# Patient Record
Sex: FEMALE | Race: BLACK OR AFRICAN AMERICAN | Employment: FULL TIME
[De-identification: names, ages, dates, MRNs, and addresses within clinical notes are randomized per-mention and may not be internally consistent; named-entity substitution may affect disease eponyms.]

---

## 2017-06-03 ENCOUNTER — HEALTH MAINTENANCE LETTER (OUTPATIENT)
Age: 32
End: 2017-06-03

## 2019-05-23 ENCOUNTER — OFFICE VISIT (OUTPATIENT)
Dept: INFECTIOUS DISEASES | Facility: CLINIC | Age: 34
End: 2019-05-23
Attending: COLON & RECTAL SURGERY
Payer: COMMERCIAL

## 2019-05-23 VITALS
HEIGHT: 64 IN | HEART RATE: 79 BPM | DIASTOLIC BLOOD PRESSURE: 70 MMHG | BODY MASS INDEX: 46.03 KG/M2 | SYSTOLIC BLOOD PRESSURE: 102 MMHG | WEIGHT: 269.6 LBS | TEMPERATURE: 98.1 F

## 2019-05-23 DIAGNOSIS — G89.4 CHRONIC PAIN SYNDROME: ICD-10-CM

## 2019-05-23 DIAGNOSIS — G89.4 CHRONIC PAIN SYNDROME: Primary | ICD-10-CM

## 2019-05-23 LAB
ALBUMIN SERPL-MCNC: 3.4 G/DL (ref 3.4–5)
ALP SERPL-CCNC: 75 U/L (ref 40–150)
ALT SERPL W P-5'-P-CCNC: 18 U/L (ref 0–50)
ANION GAP SERPL CALCULATED.3IONS-SCNC: 7 MMOL/L (ref 3–14)
AST SERPL W P-5'-P-CCNC: 16 U/L (ref 0–45)
BASOPHILS # BLD AUTO: 0 10E9/L (ref 0–0.2)
BASOPHILS NFR BLD AUTO: 1 %
BILIRUB SERPL-MCNC: 0.6 MG/DL (ref 0.2–1.3)
BUN SERPL-MCNC: 13 MG/DL (ref 7–30)
CALCIUM SERPL-MCNC: 8.7 MG/DL (ref 8.5–10.1)
CHLORIDE SERPL-SCNC: 105 MMOL/L (ref 94–109)
CO2 SERPL-SCNC: 26 MMOL/L (ref 20–32)
CREAT SERPL-MCNC: 0.85 MG/DL (ref 0.52–1.04)
DIFFERENTIAL METHOD BLD: ABNORMAL
EOSINOPHIL # BLD AUTO: 0.1 10E9/L (ref 0–0.7)
EOSINOPHIL NFR BLD AUTO: 1.8 %
ERYTHROCYTE [DISTWIDTH] IN BLOOD BY AUTOMATED COUNT: 14.5 % (ref 10–15)
GFR SERPL CREATININE-BSD FRML MDRD: 88 ML/MIN/{1.73_M2}
GLUCOSE SERPL-MCNC: 102 MG/DL (ref 70–99)
HCT VFR BLD AUTO: 39.1 % (ref 35–47)
HGB BLD-MCNC: 12.4 G/DL (ref 11.7–15.7)
IMM GRANULOCYTES # BLD: 0 10E9/L (ref 0–0.4)
IMM GRANULOCYTES NFR BLD: 0.3 %
LYMPHOCYTES # BLD AUTO: 1.4 10E9/L (ref 0.8–5.3)
LYMPHOCYTES NFR BLD AUTO: 36.9 %
MCH RBC QN AUTO: 28.1 PG (ref 26.5–33)
MCHC RBC AUTO-ENTMCNC: 31.7 G/DL (ref 31.5–36.5)
MCV RBC AUTO: 89 FL (ref 78–100)
MONOCYTES # BLD AUTO: 0.4 10E9/L (ref 0–1.3)
MONOCYTES NFR BLD AUTO: 10 %
NEUTROPHILS # BLD AUTO: 2 10E9/L (ref 1.6–8.3)
NEUTROPHILS NFR BLD AUTO: 50 %
NRBC # BLD AUTO: 0 10*3/UL
NRBC BLD AUTO-RTO: 0 /100
PLATELET # BLD AUTO: 204 10E9/L (ref 150–450)
POTASSIUM SERPL-SCNC: 3.6 MMOL/L (ref 3.4–5.3)
PROT SERPL-MCNC: 7.7 G/DL (ref 6.8–8.8)
RBC # BLD AUTO: 4.42 10E12/L (ref 3.8–5.2)
SODIUM SERPL-SCNC: 138 MMOL/L (ref 133–144)
WBC # BLD AUTO: 3.9 10E9/L (ref 4–11)

## 2019-05-23 PROCEDURE — G0463 HOSPITAL OUTPT CLINIC VISIT: HCPCS | Mod: ZF

## 2019-05-23 PROCEDURE — 87536 HIV-1 QUANT&REVRSE TRNSCRPJ: CPT | Performed by: COLON & RECTAL SURGERY

## 2019-05-23 PROCEDURE — 36415 COLL VENOUS BLD VENIPUNCTURE: CPT | Performed by: COLON & RECTAL SURGERY

## 2019-05-23 PROCEDURE — 85025 COMPLETE CBC W/AUTO DIFF WBC: CPT | Performed by: COLON & RECTAL SURGERY

## 2019-05-23 PROCEDURE — 86360 T CELL ABSOLUTE COUNT/RATIO: CPT | Performed by: COLON & RECTAL SURGERY

## 2019-05-23 PROCEDURE — 86359 T CELLS TOTAL COUNT: CPT | Performed by: COLON & RECTAL SURGERY

## 2019-05-23 PROCEDURE — 80053 COMPREHEN METABOLIC PANEL: CPT | Performed by: COLON & RECTAL SURGERY

## 2019-05-23 RX ORDER — OXYCODONE AND ACETAMINOPHEN 5; 325 MG/1; MG/1
1-2 TABLET ORAL EVERY 6 HOURS PRN
Qty: 35 TABLET | Refills: 0 | Status: SHIPPED | OUTPATIENT
Start: 2019-05-23 | End: 2019-06-27

## 2019-05-23 RX ORDER — FLUCONAZOLE 150 MG/1
TABLET ORAL
COMMUNITY
Start: 2018-01-08 | End: 2019-05-23

## 2019-05-23 RX ORDER — HYDROCORTISONE 2.5 %
CREAM (GRAM) TOPICAL
COMMUNITY
Start: 2018-07-12 | End: 2020-01-09

## 2019-05-23 RX ORDER — ALBUTEROL SULFATE 90 UG/1
1-2 AEROSOL, METERED RESPIRATORY (INHALATION)
COMMUNITY
Start: 2017-12-07 | End: 2020-12-24

## 2019-05-23 RX ORDER — OXYCODONE AND ACETAMINOPHEN 5; 325 MG/1; MG/1
1-2 TABLET ORAL
COMMUNITY
Start: 2019-05-16 | End: 2019-05-23

## 2019-05-23 RX ORDER — IBUPROFEN 600 MG/1
600 TABLET, FILM COATED ORAL
COMMUNITY
Start: 2019-05-14 | End: 2020-12-24

## 2019-05-23 ASSESSMENT — MIFFLIN-ST. JEOR: SCORE: 1897.9

## 2019-05-23 ASSESSMENT — PAIN SCALES - GENERAL: PAINLEVEL: SEVERE PAIN (7)

## 2019-05-23 NOTE — NURSING NOTE
"/70   Pulse 79   Temp 98.1  F (36.7  C) (Oral)   Ht 1.626 m (5' 4\")   Wt 122.3 kg (269 lb 9.6 oz)   BMI 46.28 kg/m    Chief Complaint   Patient presents with     Consult     consult with viktor LACKEY cma       "

## 2019-05-23 NOTE — PROGRESS NOTES
[facility]  HIV New Patient Visit  5/23/2019         REASON FOR VISIT: Transfer care for HIV    HISTORY OF PRESENT ILLNESS:  Bella Fung is a 34 year old female who is here to establish care for HIV.  She was initially diagnosed in 2011 just before she was incarcerated. Her  is HIV positive and she presumably acquired her infection from him.  She started ARVs in 2012, shortly after she was released from snf.     During her initial period of care, she followed with Sonam Sheehan at [facility] who she met while she was in custodial. She was initially treated with Truvada/boosted atazanavir from 2012 until 8/2013. She was then switched to darunavir from atazanavir; it is not entirely clear why she was switched, but around that time she had more difficulty with back pain. She also states that she had issues with depression with some of these medications. She has been on multiple medication regimens over the years (see below), some of which were switched after periods of non-compliance, and some after possible side effects, but none with severe associated adverse events.     Sonam Sheehan left [facility] in early 2018, and at that time Bella started to follow with Dr. Morel. She states that she found it challenging to communicate with him and expresses some frustration with changes to her pain medications, including stopping her Tramadol (which she was taking when she was awake/at work).     She states that she has been off of her current HIV regimen (Descovy/Tivicay) for the last 1 year. She states that even before this last year there have been times that she has been on and off the medications, but that toward the end with working with Sonam she was able to take the medications more regularly. Review of her [facility] records shows a complex history of taking her ARVs and negotiations with her providers over her chronic narcotics for her low back pain. In our interview today the patient was very edilberto about her  "chronic pain medication use.      Her main chronic medical problem in addition to HIV is her chronic back for many years. She has tried many treatments, and been on several different pain regimens. She has not tried physical therapy. She has seen pain specialists in the past, but did not find this helpful.     She states that she is otherwise in her usual state of health and denies any recent fevers, chills, cough, shortness of breath. She is chronically slightly short of breath, which she attributes to her obesity. No chest pain. She is ultimately interested in losing weight. No diarrhea, nausea, vomiting, abdominal pain. No headaches, vision changes, numbness or tingling in the hands or feet.      REVIEW OF SYSTEMS: Complete 12-point ROS is negative except as noted above.    PAST MEDICAL HISTORY:  History of congenital heart defect s/p repair  Chronic low back pain  Obesity  HIV, diaginosed 2011, CD4 Manuel: 99 (9/2018), Opportunistic Infections: None, HLA  Status: Negative 2012. Historical use of ARVs: Truvada, Reyataz, and Norvir from 2012 until 8/2013; switched atazanavir to darunavir 8/2013; emtricitabine-tenofovir and darunavir-cobicistat (PREZCOBIX) 10/2014- around 2016, Descovy and Prezcobix in 2017, then ~ 11/2017 to Descovy and Tivicay.       MEDICATIONS:  Prescribed dolutegravir + tivicay but non-adherent for past year.   Ibuprofen prn  Percocet prn  Albuterol inh prn    ALLERGIES: NKDA    SOCIAL HISTORY:  She has two children. Her  has two children. She is just sexually active with her . He is off of meds.     She smokes marijuana daily. She does not smoke tobacco. Had previously tried to get medical marijuana, but got stuck in the process. She has tried other things, but it has been several years (ecstasy, cocaine). Denies any IV drug use.     FAMILY HISTORY:  Diabetes, Hypertension     EXAM:   VITALS:  /70   Pulse 79   Temp 98.1  F (36.7  C) (Oral)   Ht 1.626 m (5' 4\")   Wt " 122.3 kg (269 lb 9.6 oz)   BMI 46.28 kg/m     GEN: Pleasant, NAD  HEENT: NC/AT, sclera anicteric. OP moist and clear.   NECK: Supple, nontender, no TRISTA.  LUNGS: CTA bilaterally  CV: RRR  ABD: Soft, nontender, no masses or HSM.  EXT: warm and without edema  SKIN: No acute rashes, lesions  NEURO: Grossly intact    LAB DATA:   Obtained at [facility] 9/2018:  CBC remarkable for WBC 3.9  BMP normal  LDL 64  HDL 51    HIV RNA 16,700  CD4 99      ASSESSMENT AND RECOMMENDATIONS:  33 yo female with HIV/AIDS and long history of non-adherence, presenting to clinic to transfer care.     1. HIV:  Off ART for at least the past year. Last CD4 was 99 (9/2018). Today we had a long discussion about treatment adherence and the risk of further disease progression. She states that she is ready to start ARVs again, and expresses a desire to improve her health. She is amenable to resuming Descovy and Tivicay as she previously tolerated this regimen well.   - Checking CBC, CMP, HIV RNA, T-Cell profile   - Resume Descovy and Tivicay     - Repeat VL in one month; will check roderick/pheno if VL >500 at that time.   - See below for routine HIV care.     2. Chronic back pain: Longstanding issue. Has been on chronic percocet and previously also on tramadol. We reviewed opioid prescription history through [facility]'s database - she has been receiving percocet through the [facility] clinic only. She receives 35 pills/month. No evidence that she is abusing these.    - Will refill usual percocet.   - Let her know that I would not add back tramadol.   - If this continues to be something she is interested in, will refer to our pain clinic.   - Will continue to encourage non-opioid pain management strategies (PT, etc). Not interested in these today.     3. Routine HIV care:   A. Prophylaxis:   - Should be on bactrim given CD4 <200 but will hold off on initiating today since we are restarting ARVs (hesitant to start multiple agents simultaneously given issues  with nonadherence). Will plan on starting soon.    B. Routine ID screening:   - Hep B negative 2012   - Hep C negative 2012   - Quant gold negative 2013   - STIs (GC/chlam/RPR) negative 1/2018, declines repeat testing (jointly monogamous relationship)   - CMV IgG positive   - Toxo IgG negative 2013   C. Vaccination status:   - HAV IgG negative; no indication for vaccination   - HBV immune   - P23 (2012), P13 (2016), needs pneumovax booster   - Tdap 2018   - Influenza UTD   - No indication for menactra   D. Bone Health:   - No screening indicated at this time.    - On TAF containing regimen.   E. Renal/CV:   - Cr normal   - BP well controlled   - Lipids okay 9/2018   - Interested in losing weight; will readdress at follow up.    - Daily marijuana use; no tobacco use. Will address at follow up.    F. Cancer screening   - Due for pap      Follow up with repeat labs in 4 weeks. She knows to contact the clinic with questions or if issues arise prior to her next visit.     Patient seen and discussed with Dr. Noguera who agrees with the above assessment and plan.               Livia Briceño MD  PGY-5 Internal Medicine/ Dermatology        Attending Attestation:  I evaluated Ms. Fung with resident Dr. Briceño.  I have reviewed pertinent labs, vitals and imaging results and have edited this note to reflect our joint findings, assessment and recommendations.    Ashley Noguera MD  [phone]

## 2019-05-23 NOTE — LETTER
5/23/2019       RE: Bella Fnug  [address]  [place] [address]     Dear Colleague,    Thank you for referring your patient, Bella Fung, to the [facility] INFECTIOUS DISEASES at [facility]. Please see a copy of my visit note below.    [facility]  HIV New Patient Visit  5/23/2019         REASON FOR VISIT: Transfer care for HIV    HISTORY OF PRESENT ILLNESS:  Bella Fung is a 34 year old female who is here to establish care for HIV.  She was initially diagnosed in 2011 just before she was incarcerated. Her  is HIV positive and she presumably acquired her infection from him.  She started ARVs in 2012, shortly after she was [facility] from [facility].     During her initial period of care, she followed with Sonam Sheehan at [facility] who she met while she was in care home. She was initially treated with Truvada/boosted atazanavir from 2012 until 8/2013. She was then switched to darunavir from atazanavir; it is not entirely clear why she was switched, but around that time she had more difficulty with back pain. She also states that she had issues with depression with some of these medications. She has been on multiple medication regimens over the years (see below), some of which were switched after periods of non-compliance, and some after possible side effects, but none with severe associated adverse events.     Sonam Sheehan left [facility] in early 2018, and at that time Bella started to follow with Dr. Morel. She states that she found it challenging to communicate with him and expresses some frustration with changes to her pain medications, including stopping her Tramadol (which she was taking when she was awake/at work).     She states that she has been off of her current HIV regimen (Descovy/Tivicay) for the last 1 year. She states that even before this last year there have been times that she has been on and off the medications, but  that toward the end with working with Sonam she was able to take the medications more regularly. Review of her [facility] records shows a complex history of taking her ARVs and negotiations with her providers over her chronic narcotics for her low back pain. In our interview today the patient was very edilberto about her chronic pain medication use.      Her main chronic medical problem in addition to HIV is her chronic back for many years. She has tried many treatments, and been on several different pain regimens. She has not tried physical therapy. She has seen pain specialists in the past, but did not find this helpful.     She states that she is otherwise in her usual state of health and denies any recent fevers, chills, cough, shortness of breath. She is chronically slightly short of breath, which she attributes to her obesity. No chest pain. She is ultimately interested in losing weight. No diarrhea, nausea, vomiting, abdominal pain. No headaches, vision changes, numbness or tingling in the hands or feet.      REVIEW OF SYSTEMS: Complete 12-point ROS is negative except as noted above.    PAST MEDICAL HISTORY:  History of congenital heart defect s/p repair  Chronic low back pain  Obesity  HIV, diaginosed 2011, CD4 Manuel: 99 (9/2018), Opportunistic Infections: None, HLA  Status: Negative 2012. Historical use of ARVs: Truvada, Reyataz, and Norvir from 2012 until 8/2013; switched atazanavir to darunavir 8/2013; emtricitabine-tenofovir and darunavir-cobicistat (PREZCOBIX) 10/2014- around 2016, Descovy and Prezcobix in 2017, then ~ 11/2017 to Descovy and Tivicay.       MEDICATIONS:  Prescribed dolutegravir + tivicay but non-adherent for past year.   Ibuprofen prn  Percocet prn  Albuterol inh prn    ALLERGIES: NKDA    SOCIAL HISTORY:  She has two children. Her  has two children. She is just sexually active with her . He is off of meds.     She smokes marijuana daily. She does not smoke tobacco. Had  "previously tried to get medical marijuana, but got stuck in the process. She has tried other things, but it has been several years (ecstasy, cocaine). Denies any IV drug use.     FAMILY HISTORY:  Diabetes, Hypertension     EXAM:   VITALS:  /70   Pulse 79   Temp 98.1  F (36.7  C) (Oral)   Ht 1.626 m (5' 4\")   Wt 122.3 kg (269 lb 9.6 oz)   BMI 46.28 kg/m      GEN: Pleasant, NAD  HEENT: NC/AT, sclera anicteric. OP moist and clear.   NECK: Supple, nontender, no TRISTA.  LUNGS: CTA bilaterally  CV: RRR  ABD: Soft, nontender, no masses or HSM.  EXT: warm and without edema  SKIN: No acute rashes, lesions  NEURO: Grossly intact    LAB DATA:   Obtained at [facility] 9/2018:  CBC remarkable for WBC 3.9  BMP normal  LDL 64  HDL 51    HIV RNA 16,700  CD4 99      ASSESSMENT AND RECOMMENDATIONS:  35 yo female with HIV/AIDS and long history of non-adherence, presenting to clinic to transfer care.     1. HIV:  Off ART for at least the past year. Last CD4 was 99 (9/2018). Today we had a long discussion about treatment adherence and the risk of further disease progression. She states that she is ready to start ARVs again, and expresses a desire to improve her health. She is amenable to resuming Descovy and Tivicay as she previously tolerated this regimen well.   - Checking CBC, CMP, HIV RNA, T-Cell profile   - Resume Descovy and Tivicay     - Repeat VL in one month; will check roderick/pheno if VL >500 at that time.   - See below for routine HIV care.     2. Chronic back pain: Longstanding issue. Has been on chronic percocet and previously also on tramadol. We reviewed opioid prescription history through [facility] - she has been receiving percocet through the [facility] HIV clinic only. She receives 35 pills/month. No evidence that she is abusing these.    - Will refill usual percocet.   - Let her know that I would not add back tramadol.   - If this continues to be something she is interested in, will refer to our pain clinic.   - " Will continue to encourage non-opioid pain management strategies (PT, etc). Not interested in these today.     3. Routine HIV care:   A. Prophylaxis:   - Should be on bactrim given CD4 <200 but will hold off on initiating today since we are restarting ARVs (hesitant to start multiple agents simultaneously given issues with nonadherence). Will plan on starting soon.    B. Routine ID screening:   - Hep B negative 2012   - Hep C negative 2012   - Quant gold negative 2013   - STIs (GC/chlam/RPR) negative 1/2018, declines repeat testing (jointly monogamous relationship)   - CMV IgG positive   - Toxo IgG negative 2013   C. Vaccination status:   - HAV IgG negative; no indication for vaccination   - HBV immune   - P23 (2012), P13 (2016), needs pneumovax booster   - Tdap 2018   - Influenza UTD   - No indication for menactra   D. Bone Health:   - No screening indicated at this time.    - On TAF containing regimen.   E. Renal/CV:   - Cr normal   - BP well controlled   - Lipids okay 9/2018   - Interested in losing weight; will readdress at follow up.    - Daily marijuana use; no tobacco use. Will address at follow up.    F. Cancer screening   - Due for pap      Follow up with repeat labs in 4 weeks. She knows to contact the clinic with questions or if issues arise prior to her next visit.     Patient seen and discussed with Dr. Noguera who agrees with the above assessment and plan.               Livia Briceño MD  PGY-5 Internal Medicine/ Dermatology        Attending Attestation:  I evaluated Ms. Fung with resident Dr. Briceño.  I have reviewed pertinent labs, vitals and imaging results and have edited this note to reflect our joint findings, assessment and recommendations.    Ashley Noguera MD  [phone]

## 2019-05-24 LAB
CD3 CELLS # BLD: 1075 CELLS/UL (ref 603–2990)
CD3 CELLS NFR BLD: 77 % (ref 49–84)
CD3+CD4+ CELLS # BLD: 46 CELLS/UL (ref 441–2156)
CD3+CD4+ CELLS NFR BLD: 3 % (ref 28–63)
CD3+CD4+ CELLS/CD3+CD8+ CLL BLD: 0.04 % (ref 1.4–2.6)
CD3+CD8+ CELLS # BLD: 1005 CELLS/UL (ref 125–1312)
CD3+CD8+ CELLS NFR BLD: 72 % (ref 10–40)
HIV1 RNA # PLAS NAA DL=20: ABNORMAL {COPIES}/ML
HIV1 RNA SERPL NAA+PROBE-LOG#: 4.1 {LOG_COPIES}/ML
IFC SPECIMEN: ABNORMAL

## 2019-05-29 ENCOUNTER — TELEPHONE (OUTPATIENT)
Dept: INFECTIOUS DISEASES | Facility: CLINIC | Age: 34
End: 2019-05-29

## 2019-05-29 NOTE — TELEPHONE ENCOUNTER
I tried to get in touch with Ms. Fung to review lab results (CD4 now <50) but had to leave a msg.  I provided her with my call back number.      Ashley Noguera MD

## 2019-06-26 ENCOUNTER — TELEPHONE (OUTPATIENT)
Dept: INFECTIOUS DISEASES | Facility: CLINIC | Age: 34
End: 2019-06-26

## 2019-06-26 NOTE — PROGRESS NOTES
"  REASON FOR VISIT: Follow up HIV    HISTORY OF PRESENT ILLNESS:  Bella Fung is a 34 year old female with HIV/AIDS who was initially seen by me in clinic on May 23rd. At that time, she had been off of ARVs for ~ one year but felt motivated to get back on treatment so we reinitiated therapy with Descovy/Dolutegravir. She returns today for follow up. She reports overall good adherence to her ARVs since last visit.  She missed \"a few\" doses because she worked late and forgot but otherwise took them every day. She is very proud of herself for her adherence.      She initially had some GI side effects but these have since resolved.  She has also had issues with bladder control while sleeping since getting restarted on ARVs.  Apparently this has been an issue with ARVs in the past.  She works nights, so sleeps during the day.  Since starting ARVs, she has urinated during sleep nearly every day. This is not associated with any other symptoms or urinary sx during the day.  Urination generally occurs when she is dreaming. No hematuria, dysuria, bowel dysfunction.  She denies any other side effects.     She is also continuing to struggle with chronic back pain. She is using her percocet as prescribed but is still very interested in getting back on tramadol since this allows her to work (she gets too sleepy with percocet).  She is very willing to be seen in our pain clinic for assessment.     Finally, her , who accompanied her today, is interested in transitioning care to our clinic. She is wondering if he could get scheduled today.     REVIEW OF SYSTEMS: Complete 12-point ROS is negative except as noted above.    PAST MEDICAL HISTORY:  1. History of congenital heart defect s/p repair  2. Chronic low back pain  3. Obesity  4. HIV/AIDS, diagnosed 2011, CD4 Manuel: 99 (9/2018), Opportunistic Infections: None, HLA  Status: Negative 2012. Historical use of ARVs: Truvada, Reyataz, and Norvir from 2012 until " "8/2013; switched atazanavir to darunavir 8/2013; emtricitabine-tenofovir and darunavir-cobicistat 10/2014- around 2016, Descovy and Prezcobix in 2017, then ~ 11/2017 to Descovy and Tivicay. Nonadherent until restarted 5/2019.      MEDICATIONS:  1. Dolutegravir  2. Tivicay   3. Ibuprofen prn  4. Percocet prn  5. Albuterol inh prn    ALLERGIES: NKDA    SOCIAL HISTORY: Reviewed and unchanged.     FAMILY HISTORY: Reviewed and unchanged.     EXAM:   VITALS:  /75   Pulse 73   Ht 1.626 m (5' 4\")   Wt 122.8 kg (270 lb 11.2 oz)   SpO2 97%   BMI 46.47 kg/m     GEN: Pleasant, NAD  HEENT: NC/AT, sclera anicteric. OP moist and clear.   NECK: Supple, nontender, no TRISTA.  LUNGS: CTA bilaterally  CV: RRR  ABD: Soft, nontender, no masses or HSM.  EXT: warm and without edema  SKIN: No acute rashes, lesions  NEURO: Grossly intact    LAB DATA:   Obtained 5/23/19:  CMP normal  WBC 3.9, remainder of CBC normal  HIV RNA 12,760  CD4 46    ASSESSMENT AND RECOMMENDATIONS:  35 yo female with HIV/AIDS and long history of non-adherence, presenting to clinic for follow up.     1. HIV:  Has struggled with adherence for a prolonged period of time, resulting in very low CD4.  Fortunately, she has done well over the past 6 weeks, with only several missed doses of Descovy/Tivicay.    - Rechecking CBC, CMP, HIV RNA  - Check roderick/pheno if VL >500   - Continue Descovy and Tivicay unless unexpected results  - See below for routine HIV care.     2. Urinary issues:  Since getting back on ARVs she has had issues with wetting the bed while sleeping. No other urinary issues. She has had this with ARVs in the past.  I explained that this is an unusual side effect that I have never seen before.  She is fortunately willing to stay on ARVs.   - She will work on decreasing fluid intake and completely emptying her bladder before bed.   - If no improvement, will refer to urology.     3. Chronic back pain: Longstanding issue. Has been on chronic percocet " and previously also on tramadol. We reviewed opioid prescription history through [facility]'s database - she has been receiving percocet through the [facility] clinic only. She receives 35 pills/month. No evidence that she is abusing these.    - Will refill usual percocet.   - Will give her a small supply of tramadol to use until she can be evaluated in our pain clinic (willing to get this scheduled today).      4. Routine HIV care:   A. Prophylaxis:   - Initiating bactrim single strength daily (Toxo IgG negative) for PJP prophylaxis.    - Holding off on MAC prophy (anticipate improvement in CD4 soon).    B. Routine ID screening:   - Hep B negative 2012   - Hep C negative 2012   - Quant gold negative 2013   - STIs (GC/chlam/RPR) negative 1/2018, declines repeat testing (jointly monogamous relationship)   - CMV IgG positive   - Toxo IgG negative 2013   C. Vaccination status:   - HAV IgG negative; no indication for vaccination   - HBV immune   - P23 (2012), P13 (2016), pneumovax booster due but declined.    - Tdap 2018   - Influenza UTD   - No indication for menactra   D. Bone Health:   - No screening indicated at this time.    - On TAF containing regimen.   E. Renal/CV:   - Cr normal   - BP well controlled   - Lipids okay 9/2018   - Checking HgbA1c today   - Interested in losing weight; will readdress at follow up.    - Daily marijuana use; no tobacco use. Will address at follow up.    F. Cancer screening   - Due for pap; will perform at follow up.       Follow up in 3 months, sooner if issues arise.  I will also see her  at follow up for a new pt visit. Bella knows to contact the clinic with questions or if issues arise prior to her next visit.       Ashley Noguera MD  [phone]

## 2019-06-27 ENCOUNTER — OFFICE VISIT (OUTPATIENT)
Dept: INFECTIOUS DISEASES | Facility: CLINIC | Age: 34
End: 2019-06-27
Attending: COLON & RECTAL SURGERY
Payer: COMMERCIAL

## 2019-06-27 VITALS
BODY MASS INDEX: 46.22 KG/M2 | HEIGHT: 64 IN | SYSTOLIC BLOOD PRESSURE: 124 MMHG | DIASTOLIC BLOOD PRESSURE: 75 MMHG | WEIGHT: 270.7 LBS | OXYGEN SATURATION: 97 % | HEART RATE: 73 BPM

## 2019-06-27 DIAGNOSIS — B20 HUMAN IMMUNODEFICIENCY VIRUS (HIV) DISEASE (H): ICD-10-CM

## 2019-06-27 DIAGNOSIS — B20 HUMAN IMMUNODEFICIENCY VIRUS (HIV) DISEASE (H): Primary | ICD-10-CM

## 2019-06-27 DIAGNOSIS — G89.4 CHRONIC PAIN SYNDROME: ICD-10-CM

## 2019-06-27 LAB
ALBUMIN SERPL-MCNC: 3.2 G/DL (ref 3.4–5)
ALP SERPL-CCNC: 65 U/L (ref 40–150)
ALT SERPL W P-5'-P-CCNC: 13 U/L (ref 0–50)
ANION GAP SERPL CALCULATED.3IONS-SCNC: 6 MMOL/L (ref 3–14)
AST SERPL W P-5'-P-CCNC: 13 U/L (ref 0–45)
BASOPHILS # BLD AUTO: 0.1 10E9/L (ref 0–0.2)
BASOPHILS NFR BLD AUTO: 1.1 %
BILIRUB SERPL-MCNC: 0.4 MG/DL (ref 0.2–1.3)
BUN SERPL-MCNC: 16 MG/DL (ref 7–30)
CALCIUM SERPL-MCNC: 8.3 MG/DL (ref 8.5–10.1)
CHLORIDE SERPL-SCNC: 107 MMOL/L (ref 94–109)
CO2 SERPL-SCNC: 25 MMOL/L (ref 20–32)
CREAT SERPL-MCNC: 0.85 MG/DL (ref 0.52–1.04)
DIFFERENTIAL METHOD BLD: ABNORMAL
EOSINOPHIL # BLD AUTO: 0.2 10E9/L (ref 0–0.7)
EOSINOPHIL NFR BLD AUTO: 3.7 %
ERYTHROCYTE [DISTWIDTH] IN BLOOD BY AUTOMATED COUNT: 15.2 % (ref 10–15)
GFR SERPL CREATININE-BSD FRML MDRD: 87 ML/MIN/{1.73_M2}
GLUCOSE SERPL-MCNC: 124 MG/DL (ref 70–99)
HBA1C MFR BLD: 6.4 % (ref 0–5.6)
HCT VFR BLD AUTO: 37.8 % (ref 35–47)
HGB BLD-MCNC: 11.9 G/DL (ref 11.7–15.7)
IMM GRANULOCYTES # BLD: 0 10E9/L (ref 0–0.4)
IMM GRANULOCYTES NFR BLD: 0.7 %
LYMPHOCYTES # BLD AUTO: 1.3 10E9/L (ref 0.8–5.3)
LYMPHOCYTES NFR BLD AUTO: 30.4 %
MCH RBC QN AUTO: 28 PG (ref 26.5–33)
MCHC RBC AUTO-ENTMCNC: 31.5 G/DL (ref 31.5–36.5)
MCV RBC AUTO: 89 FL (ref 78–100)
MONOCYTES # BLD AUTO: 0.5 10E9/L (ref 0–1.3)
MONOCYTES NFR BLD AUTO: 10.3 %
NEUTROPHILS # BLD AUTO: 2.4 10E9/L (ref 1.6–8.3)
NEUTROPHILS NFR BLD AUTO: 53.8 %
NRBC # BLD AUTO: 0 10*3/UL
NRBC BLD AUTO-RTO: 0 /100
PLATELET # BLD AUTO: 213 10E9/L (ref 150–450)
POTASSIUM SERPL-SCNC: 3.7 MMOL/L (ref 3.4–5.3)
PROT SERPL-MCNC: 7.5 G/DL (ref 6.8–8.8)
RBC # BLD AUTO: 4.25 10E12/L (ref 3.8–5.2)
SODIUM SERPL-SCNC: 138 MMOL/L (ref 133–144)
WBC # BLD AUTO: 4.4 10E9/L (ref 4–11)

## 2019-06-27 PROCEDURE — 87536 HIV-1 QUANT&REVRSE TRNSCRPJ: CPT | Performed by: COLON & RECTAL SURGERY

## 2019-06-27 PROCEDURE — 36415 COLL VENOUS BLD VENIPUNCTURE: CPT | Performed by: COLON & RECTAL SURGERY

## 2019-06-27 PROCEDURE — 80053 COMPREHEN METABOLIC PANEL: CPT | Performed by: COLON & RECTAL SURGERY

## 2019-06-27 PROCEDURE — G0463 HOSPITAL OUTPT CLINIC VISIT: HCPCS | Mod: ZF

## 2019-06-27 PROCEDURE — 83036 HEMOGLOBIN GLYCOSYLATED A1C: CPT | Performed by: COLON & RECTAL SURGERY

## 2019-06-27 PROCEDURE — 85025 COMPLETE CBC W/AUTO DIFF WBC: CPT | Performed by: COLON & RECTAL SURGERY

## 2019-06-27 RX ORDER — TRAMADOL HYDROCHLORIDE 50 MG/1
50 TABLET ORAL 3 TIMES DAILY
Qty: 90 TABLET | Refills: 1 | Status: SHIPPED | OUTPATIENT
Start: 2019-06-27 | End: 2019-08-01

## 2019-06-27 RX ORDER — SULFAMETHOXAZOLE AND TRIMETHOPRIM 400; 80 MG/1; MG/1
1 TABLET ORAL DAILY
Qty: 30 TABLET | Refills: 11 | Status: SHIPPED | OUTPATIENT
Start: 2019-06-27 | End: 2020-04-20

## 2019-06-27 RX ORDER — OXYCODONE AND ACETAMINOPHEN 5; 325 MG/1; MG/1
1-2 TABLET ORAL EVERY 6 HOURS PRN
Qty: 35 TABLET | Refills: 0 | Status: SHIPPED | OUTPATIENT
Start: 2019-06-27 | End: 2019-08-01

## 2019-06-27 ASSESSMENT — MIFFLIN-ST. JEOR: SCORE: 1902.89

## 2019-06-27 ASSESSMENT — PAIN SCALES - GENERAL: PAINLEVEL: NO PAIN (0)

## 2019-06-27 NOTE — LETTER
"6/27/2019       RE: Bella Fung  [address]  [facility] [address]     Dear Colleague,    Thank you for referring your patient, Bella Fung, to the [facility] AND INFECTIOUS DISEASES at [facility]. Please see a copy of my visit note below.      REASON FOR VISIT: Follow up HIV    HISTORY OF PRESENT ILLNESS:  Bella Fung is a 34 year old female with HIV/AIDS who was initially seen by me in clinic on May 23rd. At that time, she had been off of ARVs for ~ one year but felt motivated to get back on treatment so we reinitiated therapy with Descovy/Dolutegravir. She returns today for follow up. She reports overall good adherence to her ARVs since last visit.  She missed \"a few\" doses because she worked late and forgot but otherwise took them every day. She is very proud of herself for her adherence.      She initially had some GI side effects but these have since resolved.  She has also had issues with bladder control while sleeping since getting restarted on ARVs.  Apparently this has been an issue with ARVs in the past.  She works nights, so sleeps during the day.  Since starting ARVs, she has urinated during sleep nearly every day. This is not associated with any other symptoms or urinary sx during the day.  Urination generally occurs when she is dreaming. No hematuria, dysuria, bowel dysfunction.  She denies any other side effects.     She is also continuing to struggle with chronic back pain. She is using her percocet as prescribed but is still very interested in getting back on tramadol since this allows her to work (she gets too sleepy with percocet).  She is very willing to be seen in our pain clinic for assessment.     Finally, her , who accompanied her today, is interested in transitioning care to our clinic. She is wondering if he could get scheduled today.     REVIEW OF SYSTEMS: Complete 12-point ROS is negative except as noted " "above.    PAST MEDICAL HISTORY:  1. History of congenital heart defect s/p repair  2. Chronic low back pain  3. Obesity  4. HIV/AIDS, diagnosed 2011, CD4 Manuel: 99 (9/2018), Opportunistic Infections: None, HLA  Status: Negative 2012. Historical use of ARVs: Truvada, Reyataz, and Norvir from 2012 until 8/2013; switched atazanavir to darunavir 8/2013; emtricitabine-tenofovir and darunavir-cobicistat 10/2014- around 2016, Descovy and Prezcobix in 2017, then ~ 11/2017 to Descovy and Tivicay. Nonadherent until restarted 5/2019.      MEDICATIONS:  1. Dolutegravir  2. Tivicay   3. Ibuprofen prn  4. Percocet prn  5. Albuterol inh prn    ALLERGIES: NKDA    SOCIAL HISTORY: Reviewed and unchanged.     FAMILY HISTORY: Reviewed and unchanged.     EXAM:   VITALS:  /75   Pulse 73   Ht 1.626 m (5' 4\")   Wt 122.8 kg (270 lb 11.2 oz)   SpO2 97%   BMI 46.47 kg/m      GEN: Pleasant, NAD  HEENT: NC/AT, sclera anicteric. OP moist and clear.   NECK: Supple, nontender, no TRISTA.  LUNGS: CTA bilaterally  CV: RRR  ABD: Soft, nontender, no masses or HSM.  EXT: warm and without edema  SKIN: No acute rashes, lesions  NEURO: Grossly intact    LAB DATA:   Obtained 5/23/19:  CMP normal  WBC 3.9, remainder of CBC normal  HIV RNA 12,760  CD4 46    ASSESSMENT AND RECOMMENDATIONS:  33 yo female with HIV/AIDS and long history of non-adherence, presenting to clinic for follow up.     1. HIV:  Has struggled with adherence for a prolonged period of time, resulting in very low CD4.  Fortunately, she has done well over the past 6 weeks, with only several missed doses of Descovy/Tivicay.    - Rechecking CBC, CMP, HIV RNA  - Check roderick/pheno if VL >500   - Continue Descovy and Tivicay unless unexpected results  - See below for routine HIV care.     2. Urinary issues:  Since getting back on ARVs she has had issues with wetting the bed while sleeping. No other urinary issues. She has had this with ARVs in the past.  I explained that this is an " unusual side effect that I have never seen before.  She is fortunately willing to stay on ARVs.   - She will work on decreasing fluid intake and completely emptying her bladder before bed.   - If no improvement, will refer to urology.     3. Chronic back pain: Longstanding issue. Has been on chronic percocet and previously also on tramadol. We reviewed opioid prescription history through [facility] - she has been receiving percocet through the [facility] clinic only. She receives 35 pills/month. No evidence that she is abusing these.    - Will refill usual percocet.   - Will give her a small supply of tramadol to use until she can be evaluated in our pain clinic (willing to get this scheduled today).      4. Routine HIV care:   A. Prophylaxis:   - Initiating bactrim single strength daily (Toxo IgG negative) for PJP prophylaxis.    - Holding off on MAC prophy (anticipate improvement in CD4 soon).    B. Routine ID screening:   - Hep B negative 2012   - Hep C negative 2012   - Quant gold negative 2013   - STIs (GC/chlam/RPR) negative 1/2018, declines repeat testing (jointly monogamous relationship)   - CMV IgG positive   - Toxo IgG negative 2013   C. Vaccination status:   - HAV IgG negative; no indication for vaccination   - HBV immune   - P23 (2012), P13 (2016), pneumovax booster due but declined.    - Tdap 2018   - Influenza UTD   - No indication for menactra   D. Bone Health:   - No screening indicated at this time.    - On TAF containing regimen.   E. Renal/CV:   - Cr normal   - BP well controlled   - Lipids okay 9/2018   - Checking HgbA1c today   - Interested in losing weight; will readdress at follow up.    - Daily marijuana use; no tobacco use. Will address at follow up.    F. Cancer screening   - Due for pap; will perform at follow up.       Follow up in 3 months, sooner if issues arise.  I will also see her  at follow up for a new pt visit. Bella knows to contact the clinic with questions or if  issues arise prior to her next visit.       Ashley Noguera MD  [phone]

## 2019-06-27 NOTE — LETTER
"6/27/2019      RE: Bella Fung  [address]  [facility] [address]         REASON FOR VISIT: Follow up HIV    HISTORY OF PRESENT ILLNESS:  Bella Fung is a 34 year old female with HIV/AIDS who was initially seen by me in clinic on May 23rd. At that time, she had been off of ARVs for ~ one year but felt motivated to get back on treatment so we reinitiated therapy with Descovy/Dolutegravir. She returns today for follow up. She reports overall good adherence to her ARVs since last visit.  She missed \"a few\" doses because she worked late and forgot but otherwise took them every day. She is very proud of herself for her adherence.      She initially had some GI side effects but these have since resolved.  She has also had issues with bladder control while sleeping since getting restarted on ARVs.  Apparently this has been an issue with ARVs in the past.  She works nights, so sleeps during the day.  Since starting ARVs, she has urinated during sleep nearly every day. This is not associated with any other symptoms or urinary sx during the day.  Urination generally occurs when she is dreaming. No hematuria, dysuria, bowel dysfunction.  She denies any other side effects.     She is also continuing to struggle with chronic back pain. She is using her percocet as prescribed but is still very interested in getting back on tramadol since this allows her to work (she gets too sleepy with percocet).  She is very willing to be seen in our pain clinic for assessment.     Finally, her , who accompanied her today, is interested in transitioning care to our clinic. She is wondering if he could get scheduled today.     REVIEW OF SYSTEMS: Complete 12-point ROS is negative except as noted above.    PAST MEDICAL HISTORY:  1. History of congenital heart defect s/p repair  2. Chronic low back pain  3. Obesity  4. HIV/AIDS, diagnosed 2011, CD4 Manuel: 99 (9/2018), Opportunistic Infections: None, HLA  Status: Negative " "2012. Historical use of ARVs: Truvada, Reyataz, and Norvir from 2012 until 8/2013; switched atazanavir to darunavir 8/2013; emtricitabine-tenofovir and darunavir-cobicistat 10/2014- around 2016, Descovy and Prezcobix in 2017, then ~ 11/2017 to Descovy and Tivicay. Nonadherent until restarted 5/2019.      MEDICATIONS:  1. Dolutegravir  2. Tivicay   3. Ibuprofen prn  4. Percocet prn  5. Albuterol inh prn    ALLERGIES: NKDA    SOCIAL HISTORY: Reviewed and unchanged.     FAMILY HISTORY: Reviewed and unchanged.     EXAM:   VITALS:  /75   Pulse 73   Ht 1.626 m (5' 4\")   Wt 122.8 kg (270 lb 11.2 oz)   SpO2 97%   BMI 46.47 kg/m      GEN: Pleasant, NAD  HEENT: NC/AT, sclera anicteric. OP moist and clear.   NECK: Supple, nontender, no TRISTA.  LUNGS: CTA bilaterally  CV: RRR  ABD: Soft, nontender, no masses or HSM.  EXT: warm and without edema  SKIN: No acute rashes, lesions  NEURO: Grossly intact    LAB DATA:   Obtained 5/23/19:  CMP normal  WBC 3.9, remainder of CBC normal  HIV RNA 12,760  CD4 46    ASSESSMENT AND RECOMMENDATIONS:  33 yo female with HIV/AIDS and long history of non-adherence, presenting to clinic for follow up.     1. HIV:  Has struggled with adherence for a prolonged period of time, resulting in very low CD4.  Fortunately, she has done well over the past 6 weeks, with only several missed doses of Descovy/Tivicay.    - Rechecking CBC, CMP, HIV RNA  - Check roderick/pheno if VL >500   - Continue Descovy and Tivicay unless unexpected results  - See below for routine HIV care.     2. Urinary issues:  Since getting back on ARVs she has had issues with wetting the bed while sleeping. No other urinary issues. She has had this with ARVs in the past.  I explained that this is an unusual side effect that I have never seen before.  She is fortunately willing to stay on ARVs.   - She will work on decreasing fluid intake and completely emptying her bladder before bed.   - If no improvement, will refer to urology. "     3. Chronic back pain: Longstanding issue. Has been on chronic percocet and previously also on tramadol. We reviewed opioid prescription history through [facility] database - she has been receiving percocet through the [facility] only. She receives 35 pills/month. No evidence that she is abusing these.    - Will refill usual percocet.   - Will give her a small supply of tramadol to use until she can be evaluated in our pain clinic (willing to get this scheduled today).      4. Routine HIV care:   A. Prophylaxis:   - Initiating bactrim single strength daily (Toxo IgG negative) for PJP prophylaxis.    - Holding off on MAC prophy (anticipate improvement in CD4 soon).    B. Routine ID screening:   - Hep B negative 2012   - Hep C negative 2012   - Quant gold negative 2013   - STIs (GC/chlam/RPR) negative 1/2018, declines repeat testing (jointly monogamous relationship)   - CMV IgG positive   - Toxo IgG negative 2013   C. Vaccination status:   - HAV IgG negative; no indication for vaccination   - HBV immune   - P23 (2012), P13 (2016), pneumovax booster due but declined.    - Tdap 2018   - Influenza UTD   - No indication for menactra   D. Bone Health:   - No screening indicated at this time.    - On TAF containing regimen.   E. Renal/CV:   - Cr normal   - BP well controlled   - Lipids okay 9/2018   - Checking HgbA1c today   - Interested in losing weight; will readdress at follow up.    - Daily marijuana use; no tobacco use. Will address at follow up.    F. Cancer screening   - Due for pap; will perform at follow up.       Follow up in 3 months, sooner if issues arise.  I will also see her  at follow up for a new pt visit. Bella knows to contact the clinic with questions or if issues arise prior to her next visit.       Ashley Noguera MD  [phone]

## 2019-06-29 LAB
HIV1 RNA # PLAS NAA DL=20: NORMAL {COPIES}/ML
HIV1 RNA SERPL NAA+PROBE-LOG#: NORMAL {LOG_COPIES}/ML

## 2019-07-30 ENCOUNTER — TELEPHONE (OUTPATIENT)
Dept: INFECTIOUS DISEASES | Facility: CLINIC | Age: 34
End: 2019-07-30

## 2019-07-30 NOTE — TELEPHONE ENCOUNTER
[facility] Call Center    Phone Message    May a detailed message be left on voicemail: yes    Reason for Call: Medication Refill Request    Has the patient contacted the pharmacy for the refill? Yes   Name of medication being requested: traMADol (ULTRAM) 50 MG tablet, oxyCODONE-acetaminophen (PERCOCET) 5-325 MG tablet  Provider who prescribed the medication:   [place] [facility] [place] [facility] [place]  Date medication is needed: asap         Action Taken: Message routed to:  [facility] ([facility]): ID

## 2019-08-01 DIAGNOSIS — G89.4 CHRONIC PAIN SYNDROME: ICD-10-CM

## 2019-08-01 DIAGNOSIS — B20 HUMAN IMMUNODEFICIENCY VIRUS (HIV) DISEASE (H): ICD-10-CM

## 2019-08-01 RX ORDER — TRAMADOL HYDROCHLORIDE 50 MG/1
50 TABLET ORAL 3 TIMES DAILY
Qty: 90 TABLET | Refills: 0 | Status: SHIPPED | OUTPATIENT
Start: 2019-08-01 | End: 2019-09-11

## 2019-08-01 RX ORDER — OXYCODONE AND ACETAMINOPHEN 5; 325 MG/1; MG/1
1-2 TABLET ORAL EVERY 6 HOURS PRN
Qty: 35 TABLET | Refills: 0 | Status: SHIPPED | OUTPATIENT
Start: 2019-08-01 | End: 2019-09-11

## 2019-08-01 NOTE — TELEPHONE ENCOUNTER
Pt called asking for refill of Percocet and Tramedol. Dr Noguera was updated and explained pt was supposed to f/u with the Pain clinic for future prescriptions. Pt DOES have future apt with Pain Clinic and just needs enough of these meds to make it to apt. Dr Noguera is willing to give pt one more month worth to get her though to her apt. Script signed by Dr Noguera and walked down to [facility] for pt to .  Lady Taylor RN

## 2019-08-05 ENCOUNTER — TELEPHONE (OUTPATIENT)
Dept: INFECTIOUS DISEASES | Facility: CLINIC | Age: 34
End: 2019-08-05

## 2019-09-09 ENCOUNTER — TELEPHONE (OUTPATIENT)
Dept: INFECTIOUS DISEASES | Facility: CLINIC | Age: 34
End: 2019-09-09

## 2019-09-09 DIAGNOSIS — G89.4 CHRONIC PAIN SYNDROME: ICD-10-CM

## 2019-09-09 DIAGNOSIS — B20 HUMAN IMMUNODEFICIENCY VIRUS (HIV) DISEASE (H): ICD-10-CM

## 2019-09-09 NOTE — TELEPHONE ENCOUNTER
[facility] Call Center    Phone Message    May a detailed message be left on voicemail: yes    Reason for Call: Medication Refill Request    Has the patient contacted the pharmacy for the refill? Yes   Name of medication being requested:     1 - dolutegravir (TIVICAY) 50 MG tablet  2 - sulfamethoxazole-trimethoprim (BACTRIM) 400-80 MG tablet  3 - emtricitabine-tenofovir AF (DESCOVY) 200-25 MG per tablet  4 - traMADol (ULTRAM) 50 MG tablet   5 - oxyCODONE-acetaminophen (PERCOCET) 5-325 MG tablet        Provider who prescribed the medication: Ashley Noguera  Pharmacy: [place], [place] - [address] [place] [address] [phone]  Date medication is needed: ASAP         Action Taken: Message routed to:  [facility] ([facility]): INFECTIOUS DISEASE

## 2019-09-11 ENCOUNTER — TELEPHONE (OUTPATIENT)
Dept: PHARMACY | Facility: CLINIC | Age: 34
End: 2019-09-11

## 2019-09-11 RX ORDER — OXYCODONE AND ACETAMINOPHEN 5; 325 MG/1; MG/1
1-2 TABLET ORAL EVERY 6 HOURS PRN
Qty: 35 TABLET | Refills: 0 | Status: SHIPPED | OUTPATIENT
Start: 2019-09-11 | End: 2019-10-10

## 2019-09-11 RX ORDER — TRAMADOL HYDROCHLORIDE 50 MG/1
50 TABLET ORAL 3 TIMES DAILY
Qty: 90 TABLET | Refills: 0 | Status: SHIPPED | OUTPATIENT
Start: 2019-09-11 | End: 2019-10-10

## 2019-09-11 NOTE — TELEPHONE ENCOUNTER
[facility] Call Center    Phone Message    May a detailed message be left on voicemail: yes    Reason for Call: Medication Refill Request    Has the patient contacted the pharmacy for the refill? Yes   Name of medication being requested: tramadol and Percocet  Provider who prescribed the medication: Dr. Noguera  Pharmacy: [facility]  Date medication is needed: 9/11/19   Pt stated these 2 medications are missing down at the pharmacy      Action Taken: Message routed to:  [facility] ([facility]): ID

## 2019-09-11 NOTE — TELEPHONE ENCOUNTER
Pt came to the pharmacy.   Patient will   5 prescriptions on  09/11/19      Last Filled on: 08/01/19   Follow-up Date:  10/02/19    Marlin Wise CPhT  [facility]  [phone]

## 2019-09-11 NOTE — TELEPHONE ENCOUNTER
Per discussion with Dr. Noguera, OK to refill tramadol and percocet for 1 additional month until patient's insurance coverage is figured out and she is able to be seen for appointment in pain clinic. Dr. Riley agreed to sign both scripts since Dr. Noguera is out of clinic today. Both scripts printed out, signed by Dr. Riley, and walked down to [facility] pharmacy. Writer asked Hugo, HIV , to help patient get scheduled for B20 follow up appointment with Dr. Noguera and appointment with pain clinic.        Colleen Escobar RN

## 2019-10-10 ENCOUNTER — OFFICE VISIT (OUTPATIENT)
Dept: ANESTHESIOLOGY | Facility: CLINIC | Age: 34
End: 2019-10-10

## 2019-10-10 VITALS — HEART RATE: 60 BPM | OXYGEN SATURATION: 100 % | DIASTOLIC BLOOD PRESSURE: 91 MMHG | SYSTOLIC BLOOD PRESSURE: 142 MMHG

## 2019-10-10 DIAGNOSIS — M79.18 MYOFASCIAL PAIN: ICD-10-CM

## 2019-10-10 DIAGNOSIS — M54.2 CERVICALGIA: Primary | ICD-10-CM

## 2019-10-10 DIAGNOSIS — G89.4 CHRONIC PAIN SYNDROME: ICD-10-CM

## 2019-10-10 DIAGNOSIS — B20 HUMAN IMMUNODEFICIENCY VIRUS (HIV) DISEASE (H): ICD-10-CM

## 2019-10-10 RX ORDER — TRAMADOL HYDROCHLORIDE 50 MG/1
50 TABLET ORAL 3 TIMES DAILY
Qty: 90 TABLET | Refills: 0 | Status: SHIPPED | OUTPATIENT
Start: 2019-10-10 | End: 2019-11-15

## 2019-10-10 RX ORDER — OXYCODONE AND ACETAMINOPHEN 5; 325 MG/1; MG/1
1-2 TABLET ORAL EVERY 6 HOURS PRN
Qty: 35 TABLET | Refills: 0 | Status: SHIPPED | OUTPATIENT
Start: 2019-10-10 | End: 2019-11-15

## 2019-10-10 ASSESSMENT — ENCOUNTER SYMPTOMS
JOINT SWELLING: 0
DYSPNEA ON EXERTION: 0
SHORTNESS OF BREATH: 1
MUSCLE WEAKNESS: 0
HEMOPTYSIS: 0
NERVOUS/ANXIOUS: 1
SINUS CONGESTION: 1
SKIN CHANGES: 0
DYSURIA: 0
EYE REDNESS: 0
SNORES LOUDLY: 0
MYALGIAS: 1
BACK PAIN: 1
COUGH: 0
NECK PAIN: 0
EYE IRRITATION: 0
DOUBLE VISION: 0
EYE WATERING: 0
NAIL CHANGES: 0
HEMATURIA: 0
COUGH DISTURBING SLEEP: 0
WHEEZING: 0
SORE THROAT: 0
POOR WOUND HEALING: 0
SINUS PAIN: 0
NECK MASS: 0
SMELL DISTURBANCE: 0
EYE PAIN: 0
HOARSE VOICE: 0
POSTURAL DYSPNEA: 0
FLANK PAIN: 0
MUSCLE CRAMPS: 0
DIFFICULTY URINATING: 0
TASTE DISTURBANCE: 0
ARTHRALGIAS: 0
STIFFNESS: 1
TROUBLE SWALLOWING: 0
SPUTUM PRODUCTION: 0

## 2019-10-10 ASSESSMENT — ANXIETY QUESTIONNAIRES
2. NOT BEING ABLE TO STOP OR CONTROL WORRYING: SEVERAL DAYS
6. BECOMING EASILY ANNOYED OR IRRITABLE: NOT AT ALL
5. BEING SO RESTLESS THAT IT IS HARD TO SIT STILL: NOT AT ALL
GAD7 TOTAL SCORE: 5
7. FEELING AFRAID AS IF SOMETHING AWFUL MIGHT HAPPEN: SEVERAL DAYS
3. WORRYING TOO MUCH ABOUT DIFFERENT THINGS: SEVERAL DAYS
1. FEELING NERVOUS, ANXIOUS, OR ON EDGE: NOT AT ALL
4. TROUBLE RELAXING: MORE THAN HALF THE DAYS
7. FEELING AFRAID AS IF SOMETHING AWFUL MIGHT HAPPEN: SEVERAL DAYS
GAD7 TOTAL SCORE: 5

## 2019-10-10 ASSESSMENT — PAIN SCALES - GENERAL: PAINLEVEL: WORST PAIN (10)

## 2019-10-10 ASSESSMENT — PATIENT HEALTH QUESTIONNAIRE - PHQ9: SUM OF ALL RESPONSES TO PHQ QUESTIONS 1-9: 13

## 2019-10-10 NOTE — PROGRESS NOTES
VISIT RECOMMENDATIONS:  - Previous opioid regimen of tramadol 50mg TID #90 and oxycodone 5/325 #35 refilled  - Referral placed for physical therapy  - Referral to pain PT for evaluation and treatment    COMPREHENSIVE PAIN CLINIC INITIAL EVALUATION    I had the pleasure of meeting Ms. Bella Fung on 10/10/2019 in the [facility] in consult for Dr. Noguera with regards to her chronic low back pain.    Subjective:  The patient is a 34 year old female with past medical history of asthma, HIV/AIDS and chronic low back pain who presents for evaluation of low back pain.  The patient's pain began about 8 years ago without any particular precipitating event and has been fluctuant since that time.  She has been having more frequent episodes than she has in the past.  She reports that her pain is located primarily in the central low back and radiates to both legs.  The patient describes the pain as throbbing.  She reports that the pain is made worse by standing and sitting at work.  Her pain is improved with warm baths.  She denies any lower extremity symptoms associated with her pain.  She has issues with noctiuria and daytime urge incontinence which started about a year ago and have been getting slowly and progressively worse.  She denies any new problems with falls or balance, any new numbness or weakness of the arms or legs, any new bowel or bladder incontinence, or any sudden or unexpected weight loss.  The patient's pain is most severe during greater activity which is typically at night.  she rates her average pain score at 8/10, but it can be as low as 5/10 or as severe as 10/10.     Bella Fung has not been seen at a pain clinic in the past.    Current treatments:  - Ibuprofen  - Percocet  - Tramadol    Previous medication treatments included:  Anti-convulsants: Not tried  Muscle relaxors: Not tried  Anti-depressants: Not tried  NSAIDs: Currently using Ibuprofen with moderate  benefit  Acetaminophen: Currently using in combination therapy  Topicals: Not tried  Opioids: Tramadol and oxycodone currently    Other treatments have included:  Physical therapy: Not tried  Pain Psychology: Not tried  Chiropractic: Not tried  Acupuncture: Not tried  TENs Unit: Not tried    Past Medical History:  Medical history reviewed.   Asthma  HIV    Past Surgical History:  Pertinent surgical history reviewed.     - Repair of congenital heart defect       Medications: Pertinent medications reviewed and updated  Current Outpatient Medications   Medication     albuterol (PROVENTIL HFA) 108 (90 Base) MCG/ACT inhaler     dolutegravir (TIVICAY) 50 MG tablet     emtricitabine-tenofovir AF (DESCOVY) 200-25 MG per tablet     hydrocortisone 2.5 % cream     ibuprofen (ADVIL/MOTRIN) 600 MG tablet     oxyCODONE-acetaminophen (PERCOCET) 5-325 MG tablet     sulfamethoxazole-trimethoprim (BACTRIM) 400-80 MG tablet     traMADol (ULTRAM) 50 MG tablet     No current facility-administered medications for this visit.        [place] and [place] Prescription Monitoring Program reviewed     Allergies: Pertinent allergies reviewed   No Known Allergies    Family History:   Diabetes, HTN, both sides    family history is not on file.    Social history:   Social History     Socioeconomic History     Marital status:      Spouse name: Not on file     Number of children: Not on file     Years of education: Not on file     Highest education level: Not on file   Occupational History     Not on file   Social Needs     Financial resource strain: Not on file     Food insecurity:     Worry: Not on file     Inability: Not on file     Transportation needs:     Medical: Not on file     Non-medical: Not on file   Tobacco Use     Smoking status: Never Smoker     Smokeless tobacco: Never Used   Substance and Sexual Activity     Alcohol use: Not Currently     Drug use: Yes     Types: Marijuana     Comment: Daily.      Sexual activity: Not on file    Lifestyle     Physical activity:     Days per week: Not on file     Minutes per session: Not on file     Stress: Not on file   Relationships     Social connections:     Talks on phone: Not on file     Gets together: Not on file     Attends [employer] service: Not on file     Active member of club or organization: Not on file     Attends meetings of clubs or organizations: Not on file     Relationship status: Not on file     Intimate partner violence:     Fear of current or ex partner: Not on file     Emotionally abused: Not on file     Physically abused: Not on file     Forced sexual activity: Not on file   Other Topics Concern     Parent/sibling w/ CABG, MI or angioplasty before 65F 55M? Not Asked   Social History Narrative     Not on file     She lives in [place]. She is currently working. She works at a hotel as a .  Smoking: No. Alcohol: occasional. Street drugs: Cannabis.     Review of Systems:  The 14 system ROS was reviewed from the intake questionnaire; results listed at end of note.    Physical Exam:  BP (!) 142/91   Pulse 60   SpO2 100%     Physical Exam   Constitutional: She is oriented to person, place, and time.  She appears well-developed and well-nourished. No distress.   HENT:   Head: Normocephalic and atraumatic.   Eyes: Pupils are equal, round, and reactive to light. EOM are normal. No scleral icterus.   Neck: Normal range of motion. Neck supple.   Cardiovascular: Normal rate and regular rhythm.   Pulmonary/Chest:  NWOB. No respiratory distress.   Abdominal: deferred  Genitourinary: deferred  Neurological: she is alert and oriented to person, place, and time. CN II-XII grossly intact, coordination grossly normal.  Romberg test negative.  Skin: Skin is warm and dry. she is not diaphoretic.   Psychiatric: she has a normal mood and affect. her behavior is normal. Judgment and thought content normal.  MSK: Gait is normal    Lumbar Spine Exam:    There are tender points  identified in the lumbar paraspinal musculature  There are not trigger points identified in the lumbar paraspinal musculature    Range of Motion Flexion: reduced     Extension: reduced     Rotation: normal     Side-bending: reduced    There IS exacerbation of the patient's typical pain with rotation and extension of the lumbar spine to the left side and right side       Left  Right  Seated Slump test Negative Negative    Lower Extremity Manual Muscle Testing    Motor (0 to 5 scale):        Right         Left    Hip flexion (L1/2)            5              5    Knee extension (L2,3,4)  5              5    Ankle dorsiflexion (L4/5)             5              5    Long toe extension (L5)              5              5    Ankle plantar flexion(S1)            5              5    Reflexes (0 to 4 scale):    Right    Left    Patellar (L4)              2           2    Achilles (S1)              1           1    Clonus:    Bilateral absent    Sensation:    Light touch: intact    Imaging: External report reviewed, no images available for personal review  History:   lumbar back pain, rule out pathology      Comparison: None    Findings: With 5 lumbar type vertebrae. Vertebral body height and alignment are unremarkable without compression deformity or subluxation. Disc space heights are well-maintained. The apophyseal facet arthropathy is present at L4-5 and L5-S1 bilaterally.   Other Result Information   Interface, Radiology[facility] - 03/07/2018  9:01 AM CST  History:   lumbar back pain, rule out pathology      Comparison: None    Findings: With 5 lumbar type vertebrae. Vertebral body height and alignment are unremarkable without compression deformity or subluxation. Disc space heights are well-maintained. The apophyseal facet arthropathy is present at L4-5 and L5-S1 bilaterally.    IMPRESSION  Impression: Mild degenerative facet arthropathy involving the lower lumbar spine. No compression deformity position.      Assessment:    The patient is a 34 year old female with PMHx of asthma, HIV/AIDS, and chronic low back pain who was referred by Dr. Noguera for evaluation of chronic low back pain.  At this point, it appears that her pain is predominantly myofascial in nature, however there appears to also be an element of facet arthropathy contributing.  The patient's previous opioid regimen was renewed as she is due and has been adherent without issues previously.  There are multiple other chronic pain medications that would also be appropriate for this patient, however I will discuss these with the patient's ID provider to verify that there is no concern for medication interaction.  Will start with conservative measures including physical therapy and medication management.  If this fails to provide significant relief, will proceed with diagnostic lumbar medial branch blocks to evaluate for underlying facet arthropathy.  Minimal concern for lumbar radiculopathy based on exam, however she does endorse a history of nocturnal enuresis.  If this fails to improve with conservative measures it would be reasonable to obtain a lumbar MRI.    Plan:  1) Low back pain:  Based on history and exam, predominantly myofascial with possible elements of underlying facet mediated pain.  Will start with pain PT and medication management, however if this does not make significant improvements, will refer for lumbar medial branch blocks.    Work up:    - None at this time      - If no improvement with PT and medications, referral for lumbar medial branch blocks      - If persistent nocturnal enuresis, consider lumbar MRI    Medications:    - Will discuss medication safety and interactions with patient's ID provider prior to prescribing.  If there is no significant concerns, will plan for:      - Trial muscle relaxant medication      - Trial meloxicam 7.5mg daily      - Trial duloxetine 30mg daily    Therapies:    - Referral to pain PT for  evaluation and treatment    Interventions:    - Deferred at this time, although consider lumbar MBB in the future as noted above.    Follow up: 8 weeks    Thank you for the consult.    Toño Quiñones MD    Department of Anesthesiology  Pain Management Division    Answers for HPI/ROS submitted by the patient on 10/10/2019   IRLANDA 7 TOTAL SCORE: 5  General Symptoms: No  Skin Symptoms: Yes  HENT Symptoms: Yes  EYE SYMPTOMS: Yes  HEART SYMPTOMS: No  LUNG SYMPTOMS: Yes  INTESTINAL SYMPTOMS: No  URINARY SYMPTOMS: Yes  GYNECOLOGIC SYMPTOMS: No  BREAST SYMPTOMS: No  SKELETAL SYMPTOMS: Yes  BLOOD SYMPTOMS: No  NERVOUS SYSTEM SYMPTOMS: No  MENTAL HEALTH SYMPTOMS: Yes  Changes in hair: No  Changes in moles/birth marks: No  Itching: Yes  Rashes: Yes  Changes in nails: No  Acne: No  Hair in places you don't want it: No  Change in facial hair: No  Warts: No  Non-healing sores: No  Flaking of skin: No  Color changes of hands/feet in cold : No  Sun sensitivity: No  Skin thickening: No  Ear pain: No  Ear discharge: No  Hearing loss: No  Tinnitus: No  Nosebleeds: No  Congestion: Yes  Sinus pain: No  Trouble swallowing: No   Voice hoarseness: No  Mouth sores: No  Sore throat: No  Tooth pain: No  Gum tenderness: No  Bleeding gums: Yes  Change in taste: No  Change in sense of smell: No  Dry mouth: No  Hearing aid used: No  Neck lump: No  Eye pain: No  Vision loss: No  Dry eyes: No  Watery eyes: No  Eye bulging: No  Double vision: No  Flashing of lights: Yes  Spots: Yes  Floaters: Yes  Redness: No  Crossed eyes: No  Tunnel Vision: No  Yellowing of eyes: No  Eye irritation: No  Cough: No  Sputum or phlegm: No  Coughing up blood: No  Difficulty breating or shortness of breath: Yes  Snoring: No  Wheezing: No  Difficulty breathing on exertion: No  Nighttime Cough: No  Difficulty breathing when lying flat: No  Trouble holding urine or incontinence: Yes  Pain or burning: No  Trouble starting or stopping: No  Increased  frequency of urination: Yes  Blood in urine: No  Decreased frequency of urination: No  Frequent nighttime urination: Yes  Flank pain: No  Difficulty emptying bladder: No  Back pain: Yes  Muscle aches: Yes  Neck pain: No  Swollen joints: No  Joint pain: No  Bone pain: No  Muscle cramps: No  Muscle weakness: No  Joint stiffness: Yes  Bone fracture: No  Nervous or Anxious: Yes  Mood changes: Yes

## 2019-10-10 NOTE — NURSING NOTE
AVS given and reviewed with pt.  Pt verbalized understanding and declined any questions.     Genny Hall, RN, BSN

## 2019-10-10 NOTE — NURSING NOTE
Depression Response    Patient completed the PHQ-9 assessment for depression and scored >9? Yes  Question 9 on the PHQ-9 was positive for suicidality? No  Is the patient already receiving treatment for depression? Yes  Patient would like to speak with behavioral health team ([facility] only)? No    I personally notified the following: visit provider; Dr. Rosenbaum.     Ashlyn Rodriguez LPN      Behavioral Health/Social Work Contact Information     [facility]  Efren Simms MA, LMFT  Lead Behavioral Health Clinician  Phone: [phone]  [facility] Pager: [phone]    Non-[facility]  [facility] On-Call   Pager: [phone]

## 2019-10-10 NOTE — LETTER
10/10/2019       RE: Bella Fung  [address]  [facility] [address]     Dear Colleague,    Thank you for referring your patient, Bella Fung, to the [facility] FOR COMPREHENSIVE PAIN MANAGEMENT at [facility]. Please see a copy of my visit note below.    VISIT RECOMMENDATIONS:  - Previous opioid regimen of tramadol 50mg TID #90 and oxycodone 5/325 #35 refilled  - Referral placed for physical therapy  - Referral to pain PT for evaluation and treatment    COMPREHENSIVE PAIN CLINIC INITIAL EVALUATION    I had the pleasure of meeting Ms. Bella Fung on 10/10/2019 in the [facility] Pain Clinic in consult for Dr. Noguera with regards to her chronic low back pain.    Subjective:  The patient is a 34 year old female with past medical history of asthma, HIV/AIDS and chronic low back pain who presents for evaluation of low back pain.  The patient's pain began about 8 years ago without any particular precipitating event and has been fluctuant since that time.  She has been having more frequent episodes than she has in the past.  She reports that her pain is located primarily in the central low back and radiates to both legs.  The patient describes the pain as throbbing.  She reports that the pain is made worse by standing and sitting at work.  Her pain is improved with warm baths.  She denies any lower extremity symptoms associated with her pain.  She has issues with noctiuria and daytime urge incontinence which started about a year ago and have been getting slowly and progressively worse.  She denies any new problems with falls or balance, any new numbness or weakness of the arms or legs, any new bowel or bladder incontinence, or any sudden or unexpected weight loss.  The patient's pain is most severe during greater activity which is typically at night.  she rates her average pain score at 8/10, but it can be as low as 5/10 or as severe as 10/10.     Bella WARNER  Antonieta has not been seen at a pain clinic in the past.    Current treatments:  - Ibuprofen  - Percocet  - Tramadol    Previous medication treatments included:  Anti-convulsants: Not tried  Muscle relaxors: Not tried  Anti-depressants: Not tried  NSAIDs: Currently using Ibuprofen with moderate benefit  Acetaminophen: Currently using in combination therapy  Topicals: Not tried  Opioids: Tramadol and oxycodone currently    Other treatments have included:  Physical therapy: Not tried  Pain Psychology: Not tried  Chiropractic: Not tried  Acupuncture: Not tried  TENs Unit: Not tried    Past Medical History:  Medical history reviewed.   Asthma  HIV    Past Surgical History:  Pertinent surgical history reviewed.     - Repair of congenital heart defect    Medications: Pertinent medications reviewed and updated  Current Outpatient Medications   Medication     albuterol (PROVENTIL HFA) 108 (90 Base) MCG/ACT inhaler     dolutegravir (TIVICAY) 50 MG tablet     emtricitabine-tenofovir AF (DESCOVY) 200-25 MG per tablet     hydrocortisone 2.5 % cream     ibuprofen (ADVIL/MOTRIN) 600 MG tablet     oxyCODONE-acetaminophen (PERCOCET) 5-325 MG tablet     sulfamethoxazole-trimethoprim (BACTRIM) 400-80 MG tablet     traMADol (ULTRAM) 50 MG tablet     No current facility-administered medications for this visit.        [place] Prescription Monitoring [place] reviewed     Allergies: Pertinent allergies reviewed   No Known Allergies    Family History:   Diabetes, HTN, both sides    family history is not on file.    Social history:   Social History     Socioeconomic History     Marital status:      Spouse name: Not on file     Number of children: Not on file     Years of education: Not on file     Highest education level: Not on file   Occupational History     Not on file   Social Needs     Financial resource strain: Not on file     Food insecurity:     Worry: Not on file     Inability: Not on file     Transportation needs:      Medical: Not on file     Non-medical: Not on file   Tobacco Use     Smoking status: Never Smoker     Smokeless tobacco: Never Used   Substance and Sexual Activity     Alcohol use: Not Currently     Drug use: Yes     Types: Marijuana     Comment: Daily.      Sexual activity: Not on file   Lifestyle     Physical activity:     Days per week: Not on file     Minutes per session: Not on file     Stress: Not on file   Relationships     Social connections:     Talks on phone: Not on file     Gets together: Not on file     Attends Holiness service: Not on file     Active member of club or organization: Not on file     Attends meetings of clubs or organizations: Not on file     Relationship status: Not on file     Intimate partner violence:     Fear of current or ex partner: Not on file     Emotionally abused: Not on file     Physically abused: Not on file     Forced sexual activity: Not on file   Other Topics Concern     Parent/sibling w/ CABG, MI or angioplasty before 65F 55M? Not Asked   Social History Narrative     Not on file     She lives in [place]. She is currently working. She works at a hotel as a .  Smoking: No. Alcohol: occasional. Street drugs: Cannabis.     Review of Systems:  The 14 system ROS was reviewed from the intake questionnaire; results listed at end of note.    Physical Exam:  BP (!) 142/91   Pulse 60   SpO2 100%     Physical Exam   Constitutional: She is oriented to person, place, and time.  She appears well-developed and well-nourished. No distress.   HENT:   Head: Normocephalic and atraumatic.   Eyes: Pupils are equal, round, and reactive to light. EOM are normal. No scleral icterus.   Neck: Normal range of motion. Neck supple.   Cardiovascular: Normal rate and regular rhythm.   Pulmonary/Chest:  NWOB. No respiratory distress.   Abdominal: deferred  Genitourinary: deferred  Neurological: she is alert and oriented to person, place, and time. CN II-XII grossly intact,  coordination grossly normal.  Romberg test negative.  Skin: Skin is warm and dry. she is not diaphoretic.   Psychiatric: she has a normal mood and affect. her behavior is normal. Judgment and thought content normal.  MSK: Gait is normal    Lumbar Spine Exam:    There are tender points identified in the lumbar paraspinal musculature  There are not trigger points identified in the lumbar paraspinal musculature    Range of Motion Flexion: reduced     Extension: reduced     Rotation: normal     Side-bending: reduced    There IS exacerbation of the patient's typical pain with rotation and extension of the lumbar spine to the left side and right side       Left  Right  Seated Slump test Negative Negative    Lower Extremity Manual Muscle Testing    Motor (0 to 5 scale):        Right         Left    Hip flexion (L1/2)            5              5    Knee extension (L2,3,4)  5              5    Ankle dorsiflexion (L4/5)             5              5    Long toe extension (L5)              5              5    Ankle plantar flexion(S1)            5              5    Reflexes (0 to 4 scale):    Right    Left    Patellar (L4)              2           2    Achilles (S1)              1           1    Clonus:    Bilateral absent    Sensation:    Light touch: intact    Imaging: External report reviewed, no images available for personal review  History:   lumbar back pain, rule out pathology      Comparison: None    Findings: With 5 lumbar type vertebrae. Vertebral body height and alignment are unremarkable without compression deformity or subluxation. Disc space heights are well-maintained. The apophyseal facet arthropathy is present at L4-5 and L5-S1 bilaterally.   Other Result Information   Interface, Radiology[facility] - 03/07/2018  9:01 AM CST  History:   lumbar back pain, rule out pathology      Comparison: None    Findings: With 5 lumbar type vertebrae. Vertebral body height and alignment are unremarkable without compression  deformity or subluxation. Disc space heights are well-maintained. The apophyseal facet arthropathy is present at L4-5 and L5-S1 bilaterally.    IMPRESSION  Impression: Mild degenerative facet arthropathy involving the lower lumbar spine. No compression deformity position.     Assessment:    The patient is a 34 year old female with PMHx of asthma, HIV/AIDS, and chronic low back pain who was referred by Dr. Noguera for evaluation of chronic low back pain.  At this point, it appears that her pain is predominantly myofascial in nature, however there appears to also be an element of facet arthropathy contributing.  The patient's previous opioid regimen was renewed as she is due and has been adherent without issues previously.  There are multiple other chronic pain medications that would also be appropriate for this patient, however I will discuss these with the patient's ID provider to verify that there is no concern for medication interaction.  Will start with conservative measures including physical therapy and medication management.  If this fails to provide significant relief, will proceed with diagnostic lumbar medial branch blocks to evaluate for underlying facet arthropathy.  Minimal concern for lumbar radiculopathy based on exam, however she does endorse a history of nocturnal enuresis.  If this fails to improve with conservative measures it would be reasonable to obtain a lumbar MRI.    Plan:  1) Low back pain:  Based on history and exam, predominantly myofascial with possible elements of underlying facet mediated pain.  Will start with pain PT and medication management, however if this does not make significant improvements, will refer for lumbar medial branch blocks.    Work up:    - None at this time      - If no improvement with PT and medications, referral for lumbar medial branch blocks      - If persistent nocturnal enuresis, consider lumbar MRI    Medications:    - Will discuss medication safety and  interactions with patient's ID provider prior to prescribing.  If there is no significant concerns, will plan for:      - Trial muscle relaxant medication      - Trial meloxicam 7.5mg daily      - Trial duloxetine 30mg daily    Therapies:    - Referral to pain PT for evaluation and treatment    Interventions:    - Deferred at this time, although consider lumbar MBB in the future as noted above.    Follow up: 8 weeks    Thank you for the consult.    Toño Quiñones MD    Department of Anesthesiology  Pain Management Division

## 2019-10-10 NOTE — PATIENT INSTRUCTIONS
1. Provider will make medications recommends for your ID doctor.      2. Referral to pain physical therapy.  Please call [phone] to make an appointment.     Follow up: 3 months or sooner if needed           To speak with a nurse, schedule/reschedule/cancel a clinic appointment, or request a medication refill call: [phone]     You can also reach us by delicious: [email or web address]    For refills, please call on Monday, 1 week before your medication runs out. The doctors are not always in clinic, so this gives us time to get your prescriptions ready.  Please let us know the name of the medication you are requesting a refill of.

## 2019-10-11 ASSESSMENT — ANXIETY QUESTIONNAIRES: GAD7 TOTAL SCORE: 5

## 2019-10-24 ENCOUNTER — TELEPHONE (OUTPATIENT)
Dept: PHARMACY | Facility: CLINIC | Age: 34
End: 2019-10-24

## 2019-10-24 NOTE — TELEPHONE ENCOUNTER
Setting call date for future fills.    Last filled: 10/10/19    Next Call Date: 11/05/19    Marlin Wise CPhT  [facility]  [phone]

## 2019-10-30 ENCOUNTER — TELEPHONE (OUTPATIENT)
Dept: INFECTIOUS DISEASES | Facility: CLINIC | Age: 34
End: 2019-10-30

## 2019-10-30 NOTE — PROGRESS NOTES
REASON FOR VISIT: Follow up HIV. She was accompanied by her  and daughter today.     HISTORY OF PRESENT ILLNESS:  Bella Fung is a 34 year old female with HIV/AIDS who was last seen by me in clinic in June. She has missed several appts since this time and has unfortunately continued to struggle with taking her ARVs regularly. She estimates that she has taken Dol/tivicay only about 15 of the past 30 days. She has also not been adherent to bactrim.  She is not sure why she misses so many doses. She gets frustrated about perceived side effects (especially bladder issues). She also reports issues with insurance that has impacted her ability to get meds on time.     She reports feeling okay physically but has been very stressed about financial issues and her 's illness.  He has also been off of meds for an extended period of time and has recently started having issues with seizures.  She is hoping I can see him in clinic soon.     In terms of her chronic pain, she was able to establish care in our pain clinic and was happy with her provider. She reports that her pain has remained moderately well controlled.     REVIEW OF SYSTEMS: Complete 12-point ROS is negative except as noted above.    PAST MEDICAL HISTORY:  1. History of congenital heart defect s/p repair  2. Chronic low back pain  3. Obesity  4. HIV/AIDS, diagnosed 2011, CD4 Manuel: 99 (9/2018), Opportunistic Infections: None, HLA  Status: Negative 2012. Historical use of ARVs: Truvada, Reyataz, and Norvir from 2012 until 8/2013; switched atazanavir to darunavir 8/2013; emtricitabine-tenofovir and darunavir-cobicistat 10/2014- around 2016, Descovy and Prezcobix in 2017, then ~ 11/2017 to Descovy and Tivicay. Adherence very poor since.      MEDICATIONS:  1. Dolutegravir (poorly adherent)  2. Tivicay (poorly adherent)   3. Ibuprofen prn  4. Percocet prn  5. Albuterol inh prn  6. Tramadol prn  7. Bactrim (non adherent)    ALLERGIES:  "NKDA    SOCIAL/FAMILY HISTORY: Reviewed. Changes as above.     EXAM:   Vitals: BP (!) 144/78   Pulse 62   Temp 98.2  F (36.8  C) (Oral)   Ht 1.626 m (5' 4\")   Wt 124.1 kg (273 lb 8 oz)   BMI 46.95 kg/m    BMI= Body mass index is 46.95 kg/m .  GEN: Obese, NAD  HEENT: NC/AT, sclera anicteric. OP moist and clear.   NECK: Supple, nontender.  LUNGS: CTA bilaterally, breathing comfortably on RA.  CV: RRR  ABD: Soft, nontender, no masses or HSM.  EXT: warm and without edema  SKIN: No acute rashes, lesions  NEURO: Grossly intact    LAB DATA:   Obtained 6/27/19:  CMP normal  CBC normal  HgbA1c 6.4  HIV RNA undetectable    Obtained 5/23/19:  CD4 46    ASSESSMENT AND RECOMMENDATIONS:  35 yo female with HIV/AIDS and long history of non-adherence, presenting to clinic for follow up.     1. AIDS:  Has struggled with adherence for a prolonged period of time, resulting in very low CD4.  Although she was able to achieve an undetectable VL this summer, her adherence has been very poor so anticipate that her VL will be detectable today.  We had a long talk about the risks of poor adherence including the development of drug resistance and disease progression which will eventually lead to OIs and potentially death. She has previously not wanted to switch to Biktarvy, but seems that a single pill regimen would improve adherence.      - Rechecking CBC, CMP, HIV RNA  - Check roderick/pheno if VL >500   - Per her request, will continue Descovy and Tivicay for now; will plan on switching to single pill regimen ASAP.   - Will work on contacting her  - she clearly needs a coordinated approach to improve chances of maintaining adherence (see below).   - See below for routine HIV care.     2. Prediabetes:  Based on HgbA1c obtained at last visit. Denies symptoms of untreated diabetes, although nocturia could be related to hyperglycemia.   - Rechecking  HgbA1c (high risk for diabetes).  - If HgbA1c consistent with diabetes, will refer " to endocrinology.   - Previously counseled about weight loss, healthy diet and risk of poorly controlled diabetes.     3. Chronic back pain: Longstanding issue. She recently established care in our pain clinic and has been receiving meds through them.   - No current pain med/ARV interactions.   - Her pain provider will reach out before new meds are started to ensure no drug/drug interactions (although there are very few significant drug interactions with her current ARV regimen).     4. Social stressors:  Has previously worked with our clinic SW to ensure that she has coverage for clinic visits, labs and meds but is now not sure if she has insurance.  Also very stressed about her financial situation.  These are barriers that are clearly impacting her ability to adhere to medical care so need to be addressed ASAP.    - She will meet with our clinic  after today's appointment.   - I will work on getting in touch with her  to ensure that she is receiving optimal support.     5. Routine HIV care:   A. Prophylaxis:   - Explained that rationale for continuing bactrim single strength daily (Toxo IgG negative).    - Holding off on MAC prophylaxis (want to focus on first getting her adherent to ARVs and bactrim; azithro may be difficult for her to tolerate).    B. Routine ID screening:   - Hep B negative 2012   - Hep C negative 2012   - Quant gold negative 2013   - STIs (GC/chlam/RPR) negative 1/2018, declines repeat testing (jointly monogamous relationship)   - CMV IgG positive   - Toxo IgG negative 2013   C. Vaccination status:   - HAV IgG negative; no indication for vaccination   - HBV immune   - P23 (2012), P13 (2016), pneumovax booster today.   - Tdap 2018   - Declined Influenza vaccine today   - No indication for menactra   D. Bone Health:   - No screening indicated at this time.    - On TAF containing regimen.   E. Renal/CV:   - Cr normal   - BP high; will need to address at follow up.    -  Lipids last checked 9/2018   - Diabetes as above.    - Ongoing daily marijuana use; no tobacco use. Will address at follow up.    F. Cancer screening   - Due for pap; will perform at follow up (she arrived too late today).       I will contact her with results of labs obtained today.  She will follow up in 3-4 weeks and knows to contact me sooner if issues arise.  I will also work on getting her  scheduled with me.  I advised him to present to the ED if he has recurrent seizures.     Ashley Noguera MD  [phone]    >50% of this 60 minute visit was spent counseling about HIV and the importance of treatment.

## 2019-10-31 ENCOUNTER — OFFICE VISIT (OUTPATIENT)
Dept: INFECTIOUS DISEASES | Facility: CLINIC | Age: 34
End: 2019-10-31
Attending: COLON & RECTAL SURGERY
Payer: MEDICAID

## 2019-10-31 VITALS
WEIGHT: 273.5 LBS | SYSTOLIC BLOOD PRESSURE: 144 MMHG | BODY MASS INDEX: 46.69 KG/M2 | DIASTOLIC BLOOD PRESSURE: 78 MMHG | TEMPERATURE: 98.2 F | HEIGHT: 64 IN | HEART RATE: 62 BPM

## 2019-10-31 DIAGNOSIS — B20 HUMAN IMMUNODEFICIENCY VIRUS (HIV) DISEASE (H): Primary | ICD-10-CM

## 2019-10-31 DIAGNOSIS — B20 HUMAN IMMUNODEFICIENCY VIRUS (HIV) DISEASE (H): ICD-10-CM

## 2019-10-31 LAB
ALBUMIN SERPL-MCNC: 3.2 G/DL (ref 3.4–5)
ALP SERPL-CCNC: 64 U/L (ref 40–150)
ALT SERPL W P-5'-P-CCNC: 17 U/L (ref 0–50)
ANION GAP SERPL CALCULATED.3IONS-SCNC: 5 MMOL/L (ref 3–14)
AST SERPL W P-5'-P-CCNC: 20 U/L (ref 0–45)
BASOPHILS # BLD AUTO: 0 10E9/L (ref 0–0.2)
BASOPHILS NFR BLD AUTO: 1.1 %
BILIRUB SERPL-MCNC: 0.3 MG/DL (ref 0.2–1.3)
BUN SERPL-MCNC: 12 MG/DL (ref 7–30)
CALCIUM SERPL-MCNC: 8.5 MG/DL (ref 8.5–10.1)
CHLORIDE SERPL-SCNC: 107 MMOL/L (ref 94–109)
CO2 SERPL-SCNC: 27 MMOL/L (ref 20–32)
CREAT SERPL-MCNC: 0.87 MG/DL (ref 0.52–1.04)
DIFFERENTIAL METHOD BLD: ABNORMAL
EOSINOPHIL # BLD AUTO: 0.1 10E9/L (ref 0–0.7)
EOSINOPHIL NFR BLD AUTO: 2.9 %
ERYTHROCYTE [DISTWIDTH] IN BLOOD BY AUTOMATED COUNT: 14.2 % (ref 10–15)
GFR SERPL CREATININE-BSD FRML MDRD: 86 ML/MIN/{1.73_M2}
GLUCOSE SERPL-MCNC: 109 MG/DL (ref 70–99)
HBA1C MFR BLD: 7.1 % (ref 0–5.6)
HCT VFR BLD AUTO: 37.1 % (ref 35–47)
HGB BLD-MCNC: 11.7 G/DL (ref 11.7–15.7)
IMM GRANULOCYTES # BLD: 0 10E9/L (ref 0–0.4)
IMM GRANULOCYTES NFR BLD: 0 %
LYMPHOCYTES # BLD AUTO: 1.3 10E9/L (ref 0.8–5.3)
LYMPHOCYTES NFR BLD AUTO: 47.8 %
MCH RBC QN AUTO: 28.5 PG (ref 26.5–33)
MCHC RBC AUTO-ENTMCNC: 31.5 G/DL (ref 31.5–36.5)
MCV RBC AUTO: 90 FL (ref 78–100)
MONOCYTES # BLD AUTO: 0.3 10E9/L (ref 0–1.3)
MONOCYTES NFR BLD AUTO: 11 %
NEUTROPHILS # BLD AUTO: 1 10E9/L (ref 1.6–8.3)
NEUTROPHILS NFR BLD AUTO: 37.2 %
NRBC # BLD AUTO: 0 10*3/UL
NRBC BLD AUTO-RTO: 0 /100
PLATELET # BLD AUTO: 208 10E9/L (ref 150–450)
POTASSIUM SERPL-SCNC: 4 MMOL/L (ref 3.4–5.3)
PROT SERPL-MCNC: 7.2 G/DL (ref 6.8–8.8)
RBC # BLD AUTO: 4.11 10E12/L (ref 3.8–5.2)
SODIUM SERPL-SCNC: 139 MMOL/L (ref 133–144)
WBC # BLD AUTO: 2.7 10E9/L (ref 4–11)

## 2019-10-31 PROCEDURE — 87536 HIV-1 QUANT&REVRSE TRNSCRPJ: CPT | Performed by: COLON & RECTAL SURGERY

## 2019-10-31 PROCEDURE — G0463 HOSPITAL OUTPT CLINIC VISIT: HCPCS | Mod: 25,ZF

## 2019-10-31 PROCEDURE — 87904 PHENOTYPE DNA HIV W/CLT ADD: CPT | Performed by: COLON & RECTAL SURGERY

## 2019-10-31 PROCEDURE — 83036 HEMOGLOBIN GLYCOSYLATED A1C: CPT | Performed by: COLON & RECTAL SURGERY

## 2019-10-31 PROCEDURE — 90732 PPSV23 VACC 2 YRS+ SUBQ/IM: CPT | Mod: ZF | Performed by: COLON & RECTAL SURGERY

## 2019-10-31 PROCEDURE — 36415 COLL VENOUS BLD VENIPUNCTURE: CPT | Performed by: COLON & RECTAL SURGERY

## 2019-10-31 PROCEDURE — 87901 NFCT AGT GNTYP ALYS HIV1 REV: CPT | Performed by: COLON & RECTAL SURGERY

## 2019-10-31 PROCEDURE — 86360 T CELL ABSOLUTE COUNT/RATIO: CPT | Performed by: COLON & RECTAL SURGERY

## 2019-10-31 PROCEDURE — 85025 COMPLETE CBC W/AUTO DIFF WBC: CPT | Performed by: COLON & RECTAL SURGERY

## 2019-10-31 PROCEDURE — 86359 T CELLS TOTAL COUNT: CPT | Performed by: COLON & RECTAL SURGERY

## 2019-10-31 PROCEDURE — 87903 PHENOTYPE DNA HIV W/CULTURE: CPT | Performed by: COLON & RECTAL SURGERY

## 2019-10-31 PROCEDURE — 80053 COMPREHEN METABOLIC PANEL: CPT | Performed by: COLON & RECTAL SURGERY

## 2019-10-31 PROCEDURE — 87900 PHENOTYPE INFECT AGENT DRUG: CPT | Performed by: COLON & RECTAL SURGERY

## 2019-10-31 PROCEDURE — G0009 ADMIN PNEUMOCOCCAL VACCINE: HCPCS | Mod: ZF

## 2019-10-31 PROCEDURE — 25000128 H RX IP 250 OP 636: Mod: ZF | Performed by: COLON & RECTAL SURGERY

## 2019-10-31 PROCEDURE — 87906 NFCT AGT GNTYP ALYS HIV1: CPT | Performed by: COLON & RECTAL SURGERY

## 2019-10-31 RX ADMIN — PNEUMOCOCCAL VACCINE POLYVALENT 0.5 ML
25; 25; 25; 25; 25; 25; 25; 25; 25; 25; 25; 25; 25; 25; 25; 25; 25; 25; 25; 25; 25; 25; 25 INJECTION, SOLUTION INTRAMUSCULAR; SUBCUTANEOUS at 16:46

## 2019-10-31 ASSESSMENT — PAIN SCALES - GENERAL: PAINLEVEL: SEVERE PAIN (7)

## 2019-10-31 ASSESSMENT — MIFFLIN-ST. JEOR: SCORE: 1915.59

## 2019-10-31 NOTE — NURSING NOTE
"BP (!) 144/78   Pulse 62   Temp 98.2  F (36.8  C) (Oral)   Ht 1.626 m (5' 4\")   Wt 124.1 kg (273 lb 8 oz)   BMI 46.95 kg/m    Chief Complaint   Patient presents with     RECHECK     follow up with viktor LACKEY cma       "

## 2019-10-31 NOTE — LETTER
10/31/2019      RE: Bella Fung  [address]  [facility] [address]         REASON FOR VISIT: Follow up HIV. She was accompanied by her  and daughter today.     HISTORY OF PRESENT ILLNESS:  Bella Fung is a 34 year old female with HIV/AIDS who was last seen by me in clinic in June. She has missed several appts since this time and has unfortunately continued to struggle with taking her ARVs regularly. She estimates that she has taken Dol/tivicay only about 15 of the past 30 days. She has also not been adherent to bactrim.  She is not sure why she misses so many doses. She gets frustrated about perceived side effects (especially bladder issues). She also reports issues with insurance that has impacted her ability to get meds on time.     She reports feeling okay physically but has been very stressed about financial issues and her 's illness.  He has also been off of meds for an extended period of time and has recently started having issues with seizures.  She is hoping I can see him in clinic soon.     In terms of her chronic pain, she was able to establish care in our pain clinic and was happy with her provider. She reports that her pain has remained moderately well controlled.     REVIEW OF SYSTEMS: Complete 12-point ROS is negative except as noted above.    PAST MEDICAL HISTORY:  1. History of congenital heart defect s/p repair  2. Chronic low back pain  3. Obesity  4. HIV/AIDS, diagnosed 2011, CD4 Manuel: 99 (9/2018), Opportunistic Infections: None, HLA  Status: Negative 2012. Historical use of ARVs: Truvada, Reyataz, and Norvir from 2012 until 8/2013; switched atazanavir to darunavir 8/2013; emtricitabine-tenofovir and darunavir-cobicistat 10/2014- around 2016, Descovy and Prezcobix in 2017, then ~ 11/2017 to Descovy and Tivicay. Adherence very poor since.      MEDICATIONS:  1. Dolutegravir (poorly adherent)  2. Tivicay (poorly adherent)   3. Ibuprofen prn  4. Percocet prn  5.  "Albuterol inh prn  6. Tramadol prn  7. Bactrim (non adherent)    ALLERGIES: NKDA    SOCIAL/FAMILY HISTORY: Reviewed. Changes as above.     EXAM:   Vitals: BP (!) 144/78   Pulse 62   Temp 98.2  F (36.8  C) (Oral)   Ht 1.626 m (5' 4\")   Wt 124.1 kg (273 lb 8 oz)   BMI 46.95 kg/m     BMI= Body mass index is 46.95 kg/m .  GEN: Obese, NAD  HEENT: NC/AT, sclera anicteric. OP moist and clear.   NECK: Supple, nontender.  LUNGS: CTA bilaterally, breathing comfortably on RA.  CV: RRR  ABD: Soft, nontender, no masses or HSM.  EXT: warm and without edema  SKIN: No acute rashes, lesions  NEURO: Grossly intact    LAB DATA:   Obtained 6/27/19:  CMP normal  CBC normal  HgbA1c 6.4  HIV RNA undetectable    Obtained 5/23/19:  CD4 46    ASSESSMENT AND RECOMMENDATIONS:  35 yo female with HIV/AIDS and long history of non-adherence, presenting to clinic for follow up.     1. AIDS:  Has struggled with adherence for a prolonged period of time, resulting in very low CD4.  Although she was able to achieve an undetectable VL this summer, her adherence has been very poor so anticipate that her VL will be detectable today.  We had a long talk about the risks of poor adherence including the development of drug resistance and disease progression which will eventually lead to OIs and potentially death. She has previously not wanted to switch to Biktarvy, but seems that a single pill regimen would improve adherence.      - Rechecking CBC, CMP, HIV RNA  - Check roderick/pheno if VL >500   - Per her request, will continue Descovy and Tivicay for now; will plan on switching to single pill regimen ASAP.   - Will work on contacting her  - she clearly needs a coordinated approach to improve chances of maintaining adherence (see below).   - See below for routine HIV care.     2. Prediabetes:  Based on HgbA1c obtained at last visit. Denies symptoms of untreated diabetes, although nocturia could be related to hyperglycemia.   - Rechecking  " HgbA1c (high risk for diabetes).  - If HgbA1c consistent with diabetes, will refer to endocrinology.   - Previously counseled about weight loss, healthy diet and risk of poorly controlled diabetes.     3. Chronic back pain: Longstanding issue. She recently established care in our pain clinic and has been receiving meds through them.   - No current pain med/ARV interactions.   - Her pain provider will reach out before new meds are started to ensure no drug/drug interactions (although there are very few significant drug interactions with her current ARV regimen).     4. Social stressors:  Has previously worked with our clinic SW to ensure that she has coverage for clinic visits, labs and meds but is now not sure if she has insurance.  Also very stressed about her financial situation.  These are barriers that are clearly impacting her ability to adhere to medical care so need to be addressed ASAP.    - She will meet with our clinic  after today's appointment.   - I will work on getting in touch with her  to ensure that she is receiving optimal support.     5. Routine HIV care:   A. Prophylaxis:   - Explained that rationale for continuing bactrim single strength daily (Toxo IgG negative).    - Holding off on MAC prophylaxis (want to focus on first getting her adherent to ARVs and bactrim; azithro may be difficult for her to tolerate).    B. Routine ID screening:   - Hep B negative 2012   - Hep C negative 2012   - Quant gold negative 2013   - STIs (GC/chlam/RPR) negative 1/2018, declines repeat testing (jointly monogamous relationship)   - CMV IgG positive   - Toxo IgG negative 2013   C. Vaccination status:   - HAV IgG negative; no indication for vaccination   - HBV immune   - P23 (2012), P13 (2016), pneumovax booster today.   - Tdap 2018   - Declined Influenza vaccine today   - No indication for menactra   D. Bone Health:   - No screening indicated at this time.    - On TAF containing  regimen.   E. Renal/CV:   - Cr normal   - BP high; will need to address at follow up.    - Lipids last checked 9/2018   - Diabetes as above.    - Ongoing daily marijuana use; no tobacco use. Will address at follow up.    F. Cancer screening   - Due for pap; will perform at follow up (she arrived too late today).       I will contact her with results of labs obtained today.  She will follow up in 3-4 weeks and knows to contact me sooner if issues arise.  I will also work on getting her  scheduled with me.  I advised him to present to the ED if he has recurrent seizures.     Ashley Noguera MD  [phone]    >50% of this 60 minute visit was spent counseling about HIV and the importance of treatment.                    Ashley Noguera MD

## 2019-10-31 NOTE — LETTER
10/31/2019       RE: Bella Fung  [address]  [place] [facility] [address]     Dear Colleague,    Thank you for referring your patient, Bella Fung, to the [facility] at [facility]. Please see a copy of my visit note below.      REASON FOR VISIT: Follow up HIV. She was accompanied by her  and daughter today.     HISTORY OF PRESENT ILLNESS:  Bella Fung is a 34 year old female with HIV/AIDS who was last seen by me in clinic in June. She has missed several appts since this time and has unfortunately continued to struggle with taking her ARVs regularly. She estimates that she has taken Dol/tivicay only about 15 of the past 30 days. She has also not been adherent to bactrim.  She is not sure why she misses so many doses. She gets frustrated about perceived side effects (especially bladder issues). She also reports issues with insurance that has impacted her ability to get meds on time.     She reports feeling okay physically but has been very stressed about financial issues and her 's illness.  He has also been off of meds for an extended period of time and has recently started having issues with seizures.  She is hoping I can see him in clinic soon.     In terms of her chronic pain, she was able to establish care in our pain clinic and was happy with her provider. She reports that her pain has remained moderately well controlled.     REVIEW OF SYSTEMS: Complete 12-point ROS is negative except as noted above.    PAST MEDICAL HISTORY:  1. History of congenital heart defect s/p repair  2. Chronic low back pain  3. Obesity  4. HIV/AIDS, diagnosed 2011, CD4 Manuel: 99 (9/2018), Opportunistic Infections: None, HLA  Status: Negative 2012. Historical use of ARVs: Truvada, Reyataz, and Norvir from 2012 until 8/2013; switched atazanavir to darunavir 8/2013; emtricitabine-tenofovir and darunavir-cobicistat 10/2014- around  "2016, Descovy and Prezcobix in 2017, then ~ 11/2017 to Descovy and Tivicay. Adherence very poor since.      MEDICATIONS:  1. Dolutegravir (poorly adherent)  2. Tivicay (poorly adherent)   3. Ibuprofen prn  4. Percocet prn  5. Albuterol inh prn  6. Tramadol prn  7. Bactrim (non adherent)    ALLERGIES: NKDA    SOCIAL/FAMILY HISTORY: Reviewed. Changes as above.     EXAM:   Vitals: BP (!) 144/78   Pulse 62   Temp 98.2  F (36.8  C) (Oral)   Ht 1.626 m (5' 4\")   Wt 124.1 kg (273 lb 8 oz)   BMI 46.95 kg/m     BMI= Body mass index is 46.95 kg/m .  GEN: Obese, NAD  HEENT: NC/AT, sclera anicteric. OP moist and clear.   NECK: Supple, nontender.  LUNGS: CTA bilaterally, breathing comfortably on RA.  CV: RRR  ABD: Soft, nontender, no masses or HSM.  EXT: warm and without edema  SKIN: No acute rashes, lesions  NEURO: Grossly intact    LAB DATA:   Obtained 6/27/19:  CMP normal  CBC normal  HgbA1c 6.4  HIV RNA undetectable    Obtained 5/23/19:  CD4 46    ASSESSMENT AND RECOMMENDATIONS:  33 yo female with HIV/AIDS and long history of non-adherence, presenting to clinic for follow up.     1. AIDS:  Has struggled with adherence for a prolonged period of time, resulting in very low CD4.  Although she was able to achieve an undetectable VL this summer, her adherence has been very poor so anticipate that her VL will be detectable today.  We had a long talk about the risks of poor adherence including the development of drug resistance and disease progression which will eventually lead to OIs and potentially death. She has previously not wanted to switch to Biktarvy, but seems that a single pill regimen would improve adherence.      - Rechecking CBC, CMP, HIV RNA  - Check roderick/pheno if VL >500   - Per her request, will continue Descovy and Tivicay for now; will plan on switching to single pill regimen ASAP.   - Will work on contacting her  - she clearly needs a coordinated approach to improve chances of maintaining " adherence (see below).   - See below for routine HIV care.     2. Prediabetes:  Based on HgbA1c obtained at last visit. Denies symptoms of untreated diabetes, although nocturia could be related to hyperglycemia.   - Rechecking  HgbA1c (high risk for diabetes).  - If HgbA1c consistent with diabetes, will refer to endocrinology.   - Previously counseled about weight loss, healthy diet and risk of poorly controlled diabetes.     3. Chronic back pain: Longstanding issue. She recently established care in our pain clinic and has been receiving meds through them.   - No current pain med/ARV interactions.   - Her pain provider will reach out before new meds are started to ensure no drug/drug interactions (although there are very few significant drug interactions with her current ARV regimen).     4. Social stressors:  Has previously worked with our clinic SW to ensure that she has coverage for clinic visits, labs and meds but is now not sure if she has insurance.  Also very stressed about her financial situation.  These are barriers that are clearly impacting her ability to adhere to medical care so need to be addressed ASAP.    - She will meet with our clinic  after today's appointment.   - I will work on getting in touch with her  to ensure that she is receiving optimal support.     5. Routine HIV care:   A. Prophylaxis:   - Explained that rationale for continuing bactrim single strength daily (Toxo IgG negative).    - Holding off on MAC prophylaxis (want to focus on first getting her adherent to ARVs and bactrim; azithro may be difficult for her to tolerate).    B. Routine ID screening:   - Hep B negative 2012   - Hep C negative 2012   - Quant gold negative 2013   - STIs (GC/chlam/RPR) negative 1/2018, declines repeat testing (jointly monogamous relationship)   - CMV IgG positive   - Toxo IgG negative 2013   C. Vaccination status:   - HAV IgG negative; no indication for vaccination   - HBV  immune   - P23 (2012), P13 (2016), pneumovax booster today.   - Tdap 2018   - Declined Influenza vaccine today   - No indication for menactra   D. Bone Health:   - No screening indicated at this time.    - On TAF containing regimen.   E. Renal/CV:   - Cr normal   - BP high; will need to address at follow up.    - Lipids last checked 9/2018   - Diabetes as above.    - Ongoing daily marijuana use; no tobacco use. Will address at follow up.    F. Cancer screening   - Due for pap; will perform at follow up (she arrived too late today).       I will contact her with results of labs obtained today.  She will follow up in 3-4 weeks and knows to contact me sooner if issues arise.  I will also work on getting her  scheduled with me.  I advised him to present to the ED if he has recurrent seizures.     Ashley Noguera MD  [phone]    >50% of this 60 minute visit was spent counseling about HIV and the importance of treatment.

## 2019-11-01 LAB
CD3 CELLS # BLD: 1057 CELLS/UL (ref 603–2990)
CD3 CELLS NFR BLD: 80 % (ref 49–84)
CD3+CD4+ CELLS # BLD: 37 CELLS/UL (ref 441–2156)
CD3+CD4+ CELLS NFR BLD: 3 % (ref 28–63)
CD3+CD4+ CELLS/CD3+CD8+ CLL BLD: 0.04 % (ref 1.4–2.6)
CD3+CD8+ CELLS # BLD: 979 CELLS/UL (ref 125–1312)
CD3+CD8+ CELLS NFR BLD: 74 % (ref 10–40)
IFC SPECIMEN: ABNORMAL

## 2019-11-02 LAB
HIV1 RNA # PLAS NAA DL=20: ABNORMAL {COPIES}/ML
HIV1 RNA SERPL NAA+PROBE-LOG#: 4.4 {LOG_COPIES}/ML

## 2019-11-04 ENCOUNTER — TELEPHONE (OUTPATIENT)
Dept: PHARMACY | Facility: CLINIC | Age: 34
End: 2019-11-04

## 2019-11-04 NOTE — TELEPHONE ENCOUNTER
Attempted to contact the patient to for refill reminder call,  left message on voicemail    Last filled on: 10/10/2019    Follow-up Date: 11/08/2019    Enrique Jiménez  -----------------------------------------------   [email or web address]  Pharmacy Technician  [facility]: [phone]

## 2019-11-05 ENCOUNTER — TELEPHONE (OUTPATIENT)
Dept: INFECTIOUS DISEASES | Facility: CLINIC | Age: 34
End: 2019-11-05

## 2019-11-05 DIAGNOSIS — E11.9 NEWLY DIAGNOSED DIABETES (H): Primary | ICD-10-CM

## 2019-11-05 NOTE — TELEPHONE ENCOUNTER
I contacted Rylie with lab results. I explained that her HIV VL is elevated, as expected given poor drug adherence, and that her CD4 is down further. I also let her know that her HgbA1c test was consistent with diabetes.     She reported feeling well - she has been taking ARVs + bactrim as prescribed since I last saw her in clinic. She is still struggling with insurance issues but has already followed up with her  at [facility].  She will let me know if they can't get it sorted out.  She is very willing to be seen in our diabetes clinic.     I will see her back in clinic in a few weeks.     Ashley Noguera MD  [phone]

## 2019-11-08 ENCOUNTER — TELEPHONE (OUTPATIENT)
Dept: PHARMACY | Facility: CLINIC | Age: 34
End: 2019-11-08

## 2019-11-08 NOTE — TELEPHONE ENCOUNTER
Called patient for refill reminder.    Patient will  3 prescriptions on 11/12/2019        Last Filled on:10/10/2019   Follow-up Date: 12/02/2019    Enrique Jiménez  -----------------------------------------------   [email or web address]  Pharmacy Technician  [facility]: [phone]

## 2019-11-12 NOTE — TELEPHONE ENCOUNTER
[facility] Call Center    Phone Message    May a detailed message be left on voicemail: yes    Reason for Call: Medication Refill Request    Has the patient contacted the pharmacy for the refill? Yes   Name of medication being requested: traMADol (ULTRAM) 50 MG tablet and oxyCODONE-acetaminophen (PERCOCET) 5-325 MG tablet  Provider who prescribed the medication:   Pharmacy: [facility]  Date medication is needed: asap   -please call pt once sent over      Action Taken: Message routed to:  [facility] I.D

## 2019-11-14 NOTE — TELEPHONE ENCOUNTER
[facility] Call Center    Phone Message    May a detailed message be left on voicemail: no    Reason for Call: Medication Refill Request    Has the patient contacted the pharmacy for the refill? Yes   Name of medication being requested: oxyCODONE-acetaminophen (PERCOCET) 5-325 MG tablet  Provider who prescribed the medication: taylor prior to [facility]  Pharmacy: [place] [address]  Date medication is needed: ASAP      May a detailed message be left on voicemail: no    Reason for Call: Medication Refill Request    Has the patient contacted the pharmacy for the refill? Yes   Name of medication being requested: traMADol (ULTRAM) 50 MG tablet  Provider who prescribed the medication: taylor prior to [facility]  Pharmacy: [place] [address]  Date medication is needed: ASAP        Action Taken: Message routed to:  [facility] ID

## 2019-11-15 DIAGNOSIS — G89.4 CHRONIC PAIN SYNDROME: ICD-10-CM

## 2019-11-15 DIAGNOSIS — G89.29 CHRONIC MYOFASCIAL PAIN: Primary | ICD-10-CM

## 2019-11-15 DIAGNOSIS — B20 HUMAN IMMUNODEFICIENCY VIRUS (HIV) DISEASE (H): ICD-10-CM

## 2019-11-15 DIAGNOSIS — M79.18 CHRONIC MYOFASCIAL PAIN: Primary | ICD-10-CM

## 2019-11-15 RX ORDER — OXYCODONE AND ACETAMINOPHEN 5; 325 MG/1; MG/1
1-2 TABLET ORAL EVERY 6 HOURS PRN
Qty: 35 TABLET | Refills: 0 | Status: SHIPPED | OUTPATIENT
Start: 2019-11-15 | End: 2019-11-15

## 2019-11-15 RX ORDER — MELOXICAM 7.5 MG/1
7.5 TABLET ORAL DAILY
Qty: 30 TABLET | Refills: 1 | Status: SHIPPED | OUTPATIENT
Start: 2019-11-15 | End: 2020-03-12

## 2019-11-15 RX ORDER — OXYCODONE AND ACETAMINOPHEN 5; 325 MG/1; MG/1
1-2 TABLET ORAL EVERY 6 HOURS PRN
Qty: 35 TABLET | Refills: 0 | Status: SHIPPED | OUTPATIENT
Start: 2019-11-15 | End: 2019-12-19

## 2019-11-15 RX ORDER — TRAMADOL HYDROCHLORIDE 50 MG/1
50 TABLET ORAL 3 TIMES DAILY
Qty: 90 TABLET | Refills: 0 | Status: SHIPPED | OUTPATIENT
Start: 2019-11-15 | End: 2019-12-19

## 2019-11-15 RX ORDER — TIZANIDINE 2 MG/1
2-4 TABLET ORAL 3 TIMES DAILY PRN
Qty: 90 TABLET | Refills: 1 | Status: SHIPPED | OUTPATIENT
Start: 2019-11-15 | End: 2020-03-12 | Stop reason: SINTOL

## 2019-11-15 RX ORDER — TRAMADOL HYDROCHLORIDE 50 MG/1
50 TABLET ORAL 3 TIMES DAILY
Qty: 90 TABLET | Refills: 0 | Status: SHIPPED | OUTPATIENT
Start: 2019-11-15 | End: 2019-11-15

## 2019-11-15 NOTE — TELEPHONE ENCOUNTER
Dr Rosenbaum from pt's Pain Clinic prescribed pt another months worth of Percocet and Tramadol. Pt in route to  meds tonight.  Lady Taylor RN

## 2019-11-15 NOTE — TELEPHONE ENCOUNTER
Called pt and LVM to let her know the Pain Clinic will now be her point of contact for getting her pain meds refilled. Gave her [facility] Phone number [phone].  Lady Taylor RN

## 2019-11-15 NOTE — TELEPHONE ENCOUNTER
LPN reviewed Pt's request with Dr. Rosenbaum.   He was agreeable to refills.     LPN printed off  for MD to review.     Medications pended up, and sent to provider to review and sign.       Pt was instructed that they need to Call the clinic directly 7 days before they are needing a refill of their medication.     Dr. Rosenbaum also wanted to order Tizandine and Mobic- as he has previously discussed this with the pt's ID Provider.     Pt was assisted to schedule a follow up.     Ashlyn Rodriguez LPN

## 2019-11-15 NOTE — TELEPHONE ENCOUNTER
[facility] Call Center     Phone Message     May a detailed message be left on voicemail: yes     Reason for Call: Medication Refill Request    Has the patient contacted the pharmacy for the refill? Yes   Name of medication being requested: oxyCODONE-acetaminophen (PERCOCET) 5-325 MG tablet  Provider who prescribed the medication:  [facility]  Pharmacy: [place] [phone]  Date medication is needed: ASAP        May a detailed message be left on voicemail: no     Reason for Call: Medication Refill Request    Has the patient contacted the pharmacy for the refill? Yes   Name of medication being requested: traMADol (ULTRAM) 50 MG tablet  Provider who prescribed the medication: [facility]  Pharmacy: [place] [phone]  Date medication is needed: ASAP        *Patient is down to her last pills. She takes the bus, and is trying to coordinate all her medications being ready for  at the same time.     Please let patient know once they've been refilled, or to provide a general update.

## 2019-11-15 NOTE — TELEPHONE ENCOUNTER
[facility] Call Center    Phone Message    May a detailed message be left on voicemail: yes     Reason for Call: Medication Refill Request    Has the patient contacted the pharmacy for the refill? Yes   Name of medication being requested: oxyCODONE-acetaminophen (PERCOCET) 5-325 MG tablet  Provider who prescribed the medication: taylor prior to [facility]  Pharmacy: [address]  Date medication is needed: ASAP        May a detailed message be left on voicemail: no     Reason for Call: Medication Refill Request    Has the patient contacted the pharmacy for the refill? Yes   Name of medication being requested: traMADol (ULTRAM) 50 MG tablet  Provider who prescribed the medication: taylor prior to [facility]  Pharmacy: [address] [phone]  Date medication is needed: ASAP      *Patient is down to her last pills. She takes the bus, and is trying to coordinate all her medications being ready for  at the same time. Please let patient know once they've been refilled, or to provide a general update.    Action Taken: Message routed to:  [facility] ID

## 2019-12-04 LAB — LAB SCANNED RESULT: NORMAL

## 2019-12-13 NOTE — TELEPHONE ENCOUNTER
RECORDS RECEIVED FROM: internal    DATE RECEIVED: 12.14.19   NOTES (FOR ALL VISITS) STATUS DETAILS   OFFICE NOTES from referring provider Internal 11.5.19 Ashley Noguera MD   OFFICE NOTES from other specialist N/A    ED NOTES N/A    OPERATIVE REPORT  (thyroid, pituitary, adrenal, parathyroid) N/A    MEDICATION LIST Internal    IMAGING      DEXASCAN N/A    MRI (BRAIN) N/A    XR (Chest) N/A    CT (HEAD/NECK/CHEST/ABDOMEN) N/A    NUCLEAR  N/A    ULTRASOUND (HEAD/NECK) N/A    LABS     DIABETES: HBGA1C, CREATININE, FASTING LIPIDS, MICROALBUMIN URINE, POTASSIUM, TSH, T4    THYROID: TSH, T4, CBC, THYRODLONULIN, TOTAL T3, FREE T4, CALCITONIN, CEA Internal   HBGA1C- 10/31/19

## 2019-12-14 ENCOUNTER — PRE VISIT (OUTPATIENT)
Dept: ENDOCRINOLOGY | Facility: CLINIC | Age: 34
End: 2019-12-14

## 2019-12-16 DIAGNOSIS — B20 HUMAN IMMUNODEFICIENCY VIRUS (HIV) DISEASE (H): ICD-10-CM

## 2019-12-16 DIAGNOSIS — G89.4 CHRONIC PAIN SYNDROME: ICD-10-CM

## 2019-12-16 NOTE — TELEPHONE ENCOUNTER
[facility] Call Center    Phone Message    May a detailed message be left on voicemail: yes    Reason for Call: Medication Refill Request    Has the patient contacted the pharmacy for the refill? Yes   Name of medication being requested: traMADol (ULTRAM) 50 MG tablet and oxyCODONE-acetaminophen (PERCOCET) 5-325 MG tablet  Provider who prescribed the medication: Dr Rosenbaum  Pharmacy:  pharmacy [address] [place] [address]    Date medication is needed: ASAP        Action Taken: Message routed to:  [facility] Pain clinic

## 2019-12-19 RX ORDER — OXYCODONE AND ACETAMINOPHEN 5; 325 MG/1; MG/1
1-2 TABLET ORAL EVERY 6 HOURS PRN
Qty: 18 TABLET | Refills: 0 | Status: SHIPPED | OUTPATIENT
Start: 2019-12-19 | End: 2020-01-09

## 2019-12-19 RX ORDER — TRAMADOL HYDROCHLORIDE 50 MG/1
50 TABLET ORAL 3 TIMES DAILY
Qty: 45 TABLET | Refills: 0 | Status: SHIPPED | OUTPATIENT
Start: 2019-12-19 | End: 2020-01-09

## 2019-12-19 NOTE — TELEPHONE ENCOUNTER
Verbal orders given by Dr. Rosenbaum to refill pt's tramadol for 45 tablets and oxycodone for 18 tablets.   checked and is appropriate.  Prescriptions routed to provider for authorizations.

## 2019-12-27 ENCOUNTER — TELEPHONE (OUTPATIENT)
Dept: PHARMACY | Facility: CLINIC | Age: 34
End: 2019-12-27

## 2019-12-27 NOTE — TELEPHONE ENCOUNTER
Setting call date for future fills.    Last filled: 12/12/19    Next Call Date: 01/09/20    Marlin Wise CPhT  [facility]  [phone]

## 2020-01-08 ASSESSMENT — ANXIETY QUESTIONNAIRES
4. TROUBLE RELAXING: SEVERAL DAYS
GAD7 TOTAL SCORE: 1
5. BEING SO RESTLESS THAT IT IS HARD TO SIT STILL: NOT AT ALL
7. FEELING AFRAID AS IF SOMETHING AWFUL MIGHT HAPPEN: NOT AT ALL
6. BECOMING EASILY ANNOYED OR IRRITABLE: NOT AT ALL
7. FEELING AFRAID AS IF SOMETHING AWFUL MIGHT HAPPEN: NOT AT ALL
GAD7 TOTAL SCORE: 1
2. NOT BEING ABLE TO STOP OR CONTROL WORRYING: NOT AT ALL
3. WORRYING TOO MUCH ABOUT DIFFERENT THINGS: NOT AT ALL
1. FEELING NERVOUS, ANXIOUS, OR ON EDGE: NOT AT ALL

## 2020-01-09 ENCOUNTER — OFFICE VISIT (OUTPATIENT)
Dept: ANESTHESIOLOGY | Facility: CLINIC | Age: 35
End: 2020-01-09
Payer: MEDICAID

## 2020-01-09 VITALS
HEART RATE: 99 BPM | WEIGHT: 277 LBS | RESPIRATION RATE: 16 BRPM | BODY MASS INDEX: 47.29 KG/M2 | DIASTOLIC BLOOD PRESSURE: 71 MMHG | HEIGHT: 64 IN | SYSTOLIC BLOOD PRESSURE: 114 MMHG

## 2020-01-09 DIAGNOSIS — G89.4 CHRONIC PAIN SYNDROME: ICD-10-CM

## 2020-01-09 DIAGNOSIS — B20 HUMAN IMMUNODEFICIENCY VIRUS (HIV) DISEASE (H): ICD-10-CM

## 2020-01-09 DIAGNOSIS — M79.18 CHRONIC MYOFASCIAL PAIN: Primary | ICD-10-CM

## 2020-01-09 DIAGNOSIS — G89.29 CHRONIC MYOFASCIAL PAIN: Primary | ICD-10-CM

## 2020-01-09 DIAGNOSIS — F11.90 OPIOID USE, UNSPECIFIED, UNCOMPLICATED: ICD-10-CM

## 2020-01-09 RX ORDER — DULOXETIN HYDROCHLORIDE 30 MG/1
30 CAPSULE, DELAYED RELEASE ORAL DAILY
Qty: 30 CAPSULE | Refills: 1 | Status: SHIPPED | OUTPATIENT
Start: 2020-01-09 | End: 2020-03-24

## 2020-01-09 RX ORDER — MELOXICAM 7.5 MG/1
7.5 TABLET ORAL DAILY
Qty: 30 TABLET | Refills: 1 | Status: SHIPPED | OUTPATIENT
Start: 2020-01-09 | End: 2020-03-24

## 2020-01-09 RX ORDER — OXYCODONE AND ACETAMINOPHEN 5; 325 MG/1; MG/1
1-2 TABLET ORAL EVERY 6 HOURS PRN
Qty: 35 TABLET | Refills: 0 | Status: SHIPPED | OUTPATIENT
Start: 2020-01-09 | End: 2020-02-27

## 2020-01-09 RX ORDER — TRAMADOL HYDROCHLORIDE 50 MG/1
50 TABLET ORAL 3 TIMES DAILY
Qty: 90 TABLET | Refills: 0 | Status: SHIPPED | OUTPATIENT
Start: 2020-01-09 | End: 2020-02-27

## 2020-01-09 RX ORDER — METHOCARBAMOL 500 MG/1
500 TABLET, FILM COATED ORAL 4 TIMES DAILY PRN
Qty: 90 TABLET | Refills: 1 | Status: SHIPPED | OUTPATIENT
Start: 2020-01-09 | End: 2020-03-24

## 2020-01-09 ASSESSMENT — PAIN SCALES - GENERAL: PAINLEVEL: SEVERE PAIN (7)

## 2020-01-09 ASSESSMENT — MIFFLIN-ST. JEOR: SCORE: 1931.46

## 2020-01-09 ASSESSMENT — ANXIETY QUESTIONNAIRES: GAD7 TOTAL SCORE: 1

## 2020-01-09 NOTE — NURSING NOTE
Pt stated that she last took her pain medication at around 9:30 this morning.    Wen Bray, ODALIS

## 2020-01-09 NOTE — PROGRESS NOTES
COMPREHENSIVE PAIN CLINIC FOLLOW UP EVALUATION  01/09/20  VISIT RECOMMENDATIONS:  - Trial methocarbamol 500mg QID PRN to mitigate sedation associated with tizanidine, but can continue tizanidine at bedtime PRN  - Trial duloxetine 30mg daily which can be increased at follow up if she is not having side effects  - Continue Percocet 5/325 1-2 tablets Q6h PRN #35 (patient takes primarily at night)  - Continue Tramadol 50mg TID PRN #90 (patient takes primarily during daytime)  - Continue meloxicam 7.5mg daily  - Referral placed for PT evaluation and treatment  - UDS performed today    Interval History:  The patient is a 34 year old female with a PMHx of asthma, HIV/AIDS and chronic low back pain who returns to clinic today for continued evaluation of chronic low back pain.  Since her last visit, the patient reports:  - Her pain is largely unchanged from her previous visit  - She was not able to set up an evaluation by physical therapy, but is interested in doing so  - She attempted to schedule pain psychology with one of the clinics from the list provided but was unable to find one that was accepting new patients  - She started meloxicam after this was cleared by her ID doctor and feels that this is providing a modest benefit  - She endorses using recreational cannabis which she finds beneficial for both pain and sleep.  We discussed the risks associated with concomitant use of multiple medications or recreational drugs with sedating or respiratory depressant effects    Previous Interval History on 10/10/19:   The patient is a 34 year old female with past medical history of asthma, HIV/AIDS and chronic low back pain who presents for evaluation of low back pain.  The patient's pain began about 8 years ago without any particular precipitating event and has been fluctuant since that time.  She has been having more frequent episodes than she has in the past.  She reports that her pain is located primarily in the central low  back and radiates to both legs.  The patient describes the pain as throbbing.  She reports that the pain is made worse by standing and sitting at work.  Her pain is improved with warm baths.  She denies any lower extremity symptoms associated with her pain.  She has issues with noctiuria and daytime urge incontinence which started about a year ago and have been getting slowly and progressively worse.  She denies any new problems with falls or balance, any new numbness or weakness of the arms or legs, any new bowel or bladder incontinence, or any sudden or unexpected weight loss.  The patient's pain is most severe during greater activity which is typically at night.  she rates her average pain score at 8/10, but it can be as low as 5/10 or as severe as 10/10.     Previous recommendations from visit on 10/10/19 include:  - Previous opioid regimen of tramadol 50mg TID #90 and oxycodone 5/325 #35 refilled  - Referral placed for physical therapy  - Referral to pain PT for evaluation and treatment    Current Treatments:  - Ibuprofen  - Percocet  - Tramadol    THE 4 A's OF OPIOID MAINTENANCE ANALGESIA    Analgesia: Reports improved pain control with doses of medication    Activity: She has greater tolerance for activity at work and at home    Adverse effects: denies    Adherence to Rx protocol: No concerns    [place] Board of Pharmacy Data Base Reviewed:    YES; no concerns    Allergies reviewed:   No Known Allergies    Medications reviewed: Pertinent medications reviewed and updated  Current Outpatient Medications   Medication     albuterol (PROVENTIL HFA) 108 (90 Base) MCG/ACT inhaler     dolutegravir (TIVICAY) 50 MG tablet     emtricitabine-tenofovir AF (DESCOVY) 200-25 MG per tablet     ibuprofen (ADVIL/MOTRIN) 600 MG tablet     meloxicam (MOBIC) 7.5 MG tablet     oxyCODONE-acetaminophen (PERCOCET) 5-325 MG tablet     sulfamethoxazole-trimethoprim (BACTRIM) 400-80 MG tablet     tiZANidine (ZANAFLEX) 2 MG tablet      "traMADol (ULTRAM) 50 MG tablet     No current facility-administered medications for this visit.      Medical history reviewed:   There is no problem list on file for this patient.    Review of Systems:  The 14 system ROS was reviewed from the intake questionnaire; results listed at end of note.    Physical Exam:  /71   Pulse 99   Resp 16   Ht 1.626 m (5' 4\")   Wt 125.6 kg (277 lb)   BMI 47.55 kg/m      Physical Exam   Constitutional: Patient is oriented to person, place, and time.  Patient appears well-developed and well-nourished. No distress.   HENT:   Head: Normocephalic and atraumatic.   Eyes: Pupils are equal, round, and reactive to light. EOM are normal. No scleral icterus.   Neck: Normal range of motion. Neck supple.   Cardiovascular: Normal rate and regular rhythm.   Pulmonary/Chest: Effort normal. No respiratory distress.   Abdominal: deferred  Genitourinary: deferred  Neuromuscular: Gait normal  Neurological: she is alert and oriented to person, place, and time. No cranial nerve deficit. Coordination normal.   Skin: Skin is warm and dry. she is not diaphoretic.   Psychiatric: she has a normal mood and affect. her behavior is normal. Judgment and thought content normal.    Imaging:  None available for review since previous evaluation    Assessment:    The patient is a 34 year old female with PMHx of asthma, HIV/AIDS and chronic low back pain who presents today for continued management of chronic low back pain.  Consistent with previous evaluation, she has predominantly myofascial pain with a likely component of lumbar spondylosis and facet mediated pain.  Since her last evaluation, the addition of meloxicam has provided a modest benefit for pain, but she continue to utilize opioid medication for improved function.  Will trial additional medications at this visit and place a new order for PT as the patient is not interested in interventions at this time.  Her best chance for prolonged recovery of " function will be centered on physical therapy and pain psychology to optimize normal functioning and reduce dependence on opioid medications.    Plan:  1) Low back pain:  As noted at her previous evaluation, this is likely primarily myofascial with some component of underlying facet mediated pain.  Addition of complementary medications for chronic pain and initiation of pain PT will likely provide significant functional benefit and potential reduce her need for opioid medications.  She continues to use recreational cannabis which she states provides benefit for both pain and sleep and we discussed the risks associated with this at her visit.  As she has been on a stable regimen and using recreational cannabis for some time, the risk for continuing her current regimen is minimal.  However, long term therapeutic goals do include a reduction in opioid medication.    Work up:    - UDS today    Medications:    - Trial duloxetine 30mg daily, will increase to 60mg daily at next visit if patient is not having side effects    - Trial methocarbamol 500mg QID PRN myofascial pain and muscle cramps    - Continue tizanidine 2-4mg at night PRN    - Continue meloxicam 7.5 mg daily, consider increase to 15mg daily at next visit.    - Continue percocet 5/325 Q6h PRN #35 for 30 days    - Continue tramadol 50mg TID PRN #90 for 30 days    Therapies:    - Referral placed for pain PT    Interventions:    - None at this time as patient does not wish to pursue this modality of treatment.    Follow up: 8 weeks    Toño Quiñones MD    Department of Anesthesiology  Pain Management Division    Answers for HPI/ROS submitted by the patient on 1/8/2020   IRLANDA 7 TOTAL SCORE: 1

## 2020-01-09 NOTE — NURSING NOTE
Pt had to leave and was able to go through AVS with LPN.   Pt requested that information be sent to them on [employer].     Ashlyn Rodriguez LPN

## 2020-01-09 NOTE — PATIENT INSTRUCTIONS
Medications:    Duloxetine 30 mg- 1 capsule by mouth daily.     Mobic 7.5 mg- 1 tablet by mouth daily with food.    Robaxin- 500 mg, 1 tablet by mouth up to 4 times daily.     Percocet 5/325 mg tablets- 1-2 tablets daily by mouth- 35 tablets dispensed to last 30 days.     Tramadol 50 mg- take 1 tablet up to 3 times daily, as needed for pain.     When calling in for refills for your Opioid Medication- You MUST call (Or MyChart) the clinic DIRECTLY, and at least 7 days before you are needing your Medication refill.    Treatment planning:    Controlled substance agreement signed today. A copy was provided to you for your records.     Urine Drug screen performed today.       Recommended Follow up:  2-3 months in clinic for medication refills.           To speak with a nurse, schedule/reschedule/cancel a clinic appointment, or request a medication refill call: [phone]     You can also reach us by [employer]: [email or web address]    Please provide the clinic with a minium of 1 week notice, on all prescription refills.

## 2020-01-09 NOTE — LETTER
1/9/2020       RE: Bella Fung  [address]  [facility] [address]     Dear Colleague,    Thank you for referring your patient, Bella Fung, to the [facility] FOR COMPREHENSIVE PAIN MANAGEMENT at [facility]. Please see a copy of my visit note below.    COMPREHENSIVE PAIN CLINIC FOLLOW UP EVALUATION  01/09/20  VISIT RECOMMENDATIONS:  - Trial methocarbamol 500mg QID PRN to mitigate sedation associated with tizanidine, but can continue tizanidine at bedtime PRN  - Trial duloxetine 30mg daily which can be increased at follow up if she is not having side effects  - Continue Percocet 5/325 1-2 tablets Q6h PRN #35 (patient takes primarily at night)  - Continue Tramadol 50mg TID PRN #90 (patient takes primarily during daytime)  - Continue meloxicam 7.5mg daily  - Referral placed for PT evaluation and treatment  - UDS performed today    Interval History:  The patient is a 34 year old female with a PMHx of asthma, HIV/AIDS and chronic low back pain who returns to clinic today for continued evaluation of chronic low back pain.  Since her last visit, the patient reports:  - Her pain is largely unchanged from her previous visit  - She was not able to set up an evaluation by physical therapy, but is interested in doing so  - She attempted to schedule pain psychology with one of the clinics from the list provided but was unable to find one that was accepting new patients  - She started meloxicam after this was cleared by her ID doctor and feels that this is providing a modest benefit  - She endorses using recreational cannabis which she finds beneficial for both pain and sleep.  We discussed the risks associated with concomitant use of multiple medications or recreational drugs with sedating or respiratory depressant effects    Previous Interval History on 10/10/19:   The patient is a 34 year old female with past medical history of asthma, HIV/AIDS and chronic low back pain  who presents for evaluation of low back pain.  The patient's pain began about 8 years ago without any particular precipitating event and has been fluctuant since that time.  She has been having more frequent episodes than she has in the past.  She reports that her pain is located primarily in the central low back and radiates to both legs.  The patient describes the pain as throbbing.  She reports that the pain is made worse by standing and sitting at work.  Her pain is improved with warm baths.  She denies any lower extremity symptoms associated with her pain.  She has issues with noctiuria and daytime urge incontinence which started about a year ago and have been getting slowly and progressively worse.  She denies any new problems with falls or balance, any new numbness or weakness of the arms or legs, any new bowel or bladder incontinence, or any sudden or unexpected weight loss.  The patient's pain is most severe during greater activity which is typically at night.  she rates her average pain score at 8/10, but it can be as low as 5/10 or as severe as 10/10.      Previous recommendations from visit on 10/10/19 include:  - Previous opioid regimen of tramadol 50mg TID #90 and oxycodone 5/325 #35 refilled  - Referral placed for physical therapy  - Referral to pain PT for evaluation and treatment    Current Treatments:  - Ibuprofen  - Percocet  - Tramadol    THE 4 A's OF OPIOID MAINTENANCE ANALGESIA    Analgesia: Reports improved pain control with doses of medication    Activity: She has greater tolerance for activity at work and at home    Adverse effects: denies    Adherence to Rx protocol: No concerns    [place] Board of Pharmacy Data Base Reviewed:    YES; no concerns    Allergies reviewed:   No Known Allergies    Medications reviewed: Pertinent medications reviewed and updated  Current Outpatient Medications   Medication     albuterol (PROVENTIL HFA) 108 (90 Base) MCG/ACT inhaler     dolutegravir (TIVICAY) 50  "MG tablet     emtricitabine-tenofovir AF (DESCOVY) 200-25 MG per tablet     ibuprofen (ADVIL/MOTRIN) 600 MG tablet     meloxicam (MOBIC) 7.5 MG tablet     oxyCODONE-acetaminophen (PERCOCET) 5-325 MG tablet     sulfamethoxazole-trimethoprim (BACTRIM) 400-80 MG tablet     tiZANidine (ZANAFLEX) 2 MG tablet     traMADol (ULTRAM) 50 MG tablet     No current facility-administered medications for this visit.      Medical history reviewed:   There is no problem list on file for this patient.    Review of Systems:  The 14 system ROS was reviewed from the intake questionnaire; results listed at end of note.    Physical Exam:  /71   Pulse 99   Resp 16   Ht 1.626 m (5' 4\")   Wt 125.6 kg (277 lb)   BMI 47.55 kg/m       Physical Exam   Constitutional: Patient is oriented to person, place, and time.  Patient appears well-developed and well-nourished. No distress.   HENT:   Head: Normocephalic and atraumatic.   Eyes: Pupils are equal, round, and reactive to light. EOM are normal. No scleral icterus.   Neck: Normal range of motion. Neck supple.   Cardiovascular: Normal rate and regular rhythm.   Pulmonary/Chest: Effort normal. No respiratory distress.   Abdominal: deferred  Genitourinary: deferred  Neuromuscular: Gait normal  Neurological: she is alert and oriented to person, place, and time. No cranial nerve deficit. Coordination normal.   Skin: Skin is warm and dry. she is not diaphoretic.   Psychiatric: she has a normal mood and affect. her behavior is normal. Judgment and thought content normal.    Imaging:  None available for review since previous evaluation    Assessment:    The patient is a 34 year old female with PMHx of asthma, HIV/AIDS and chronic low back pain who presents today for continued management of chronic low back pain.  Consistent with previous evaluation, she has predominantly myofascial pain with a likely component of lumbar spondylosis and facet mediated pain.  Since her last evaluation, the " addition of meloxicam has provided a modest benefit for pain, but she continue to utilize opioid medication for improved function.  Will trial additional medications at this visit and place a new order for PT as the patient is not interested in interventions at this time.  Her best chance for prolonged recovery of function will be centered on physical therapy and pain psychology to optimize normal functioning and reduce dependence on opioid medications.    Plan:  1) Low back pain:  As noted at her previous evaluation, this is likely primarily myofascial with some component of underlying facet mediated pain.  Addition of complementary medications for chronic pain and initiation of pain PT will likely provide significant functional benefit and potential reduce her need for opioid medications.  She continues to use recreational cannabis which she states provides benefit for both pain and sleep and we discussed the risks associated with this at her visit.  As she has been on a stable regimen and using recreational cannabis for some time, the risk for continuing her current regimen is minimal.  However, long term therapeutic goals do include a reduction in opioid medication.    Work up:    - UDS today    Medications:    - Trial duloxetine 30mg daily, will increase to 60mg daily at next visit if patient is not having side effects    - Trial methocarbamol 500mg QID PRN myofascial pain and muscle cramps    - Continue tizanidine 2-4mg at night PRN    - Continue meloxicam 7.5 mg daily, consider increase to 15mg daily at next visit.    - Continue percocet 5/325 Q6h PRN #35 for 30 days    - Continue tramadol 50mg TID PRN #90 for 30 days    Therapies:    - Referral placed for pain PT    Interventions:    - None at this time as patient does not wish to pursue this modality of treatment.    Follow up: 8 weeks    Toño Quiñones MD    Department of Anesthesiology  Pain Management Division

## 2020-01-09 NOTE — PROGRESS NOTES
"COMPREHENSIVE PAIN CLINIC FOLLOW UP EVALUATION  01/09/20  VISIT RECOMMENDATIONS:    Interval History:  The patient is a 34 year old female with a PMHx of *** who returns to clinic today for continued evaluation of ***.  Since her last visit, the patient' reports:  ***    Previous Interval History on ***:   ***    Previous recommendations from visit on *** include:  ***    Current Treatments:  ***    THE 4 A's OF OPIOID MAINTENANCE ANALGESIA    Analgesia: ***    Activity: ***    Adverse effects: ***    Adherence to Rx protocol: ***    [place] Board of Pharmacy Data Base Reviewed:    {YES/NO:847890}; ***    Allergies reviewed:   No Known Allergies    Medications reviewed: Pertinent medications reviewed and updated  Current Outpatient Medications   Medication     albuterol (PROVENTIL HFA) 108 (90 Base) MCG/ACT inhaler     dolutegravir (TIVICAY) 50 MG tablet     emtricitabine-tenofovir AF (DESCOVY) 200-25 MG per tablet     ibuprofen (ADVIL/MOTRIN) 600 MG tablet     meloxicam (MOBIC) 7.5 MG tablet     oxyCODONE-acetaminophen (PERCOCET) 5-325 MG tablet     sulfamethoxazole-trimethoprim (BACTRIM) 400-80 MG tablet     tiZANidine (ZANAFLEX) 2 MG tablet     traMADol (ULTRAM) 50 MG tablet     No current facility-administered medications for this visit.        Medical history reviewed:   There is no problem list on file for this patient.      Review of Systems:  The 14 system ROS was reviewed from the intake questionnaire; results listed at end of note.    Physical Exam:  /71   Pulse 99   Resp 16   Ht 1.626 m (5' 4\")   Wt 125.6 kg (277 lb)   BMI 47.55 kg/m      Physical Exam   Constitutional: Patient is oriented to person, place, and time.  Patient appears well-developed and well-nourished. No distress.   HENT:   Head: Normocephalic and atraumatic.   Eyes: Pupils are equal, round, and reactive to light. EOM are normal. No scleral icterus.   Neck: Normal range of motion. Neck supple.   Cardiovascular: Normal rate " and regular rhythm.   Pulmonary/Chest: Effort normal. No respiratory distress.   Abdominal: deferred  Genitourinary: deferred  Neuromuscular: Gait normal***  Neurological: she is alert and oriented to person, place, and time. No cranial nerve deficit. Coordination normal.   Skin: Skin is warm and dry. she is not diaphoretic.   Psychiatric: she has a normal mood and affect. her behavior is normal. Judgment and thought content normal.    Imaging:  ***    EMG:  ***    Laboratory Results:  ***    Assessment:    The patient is a 34 year old female with PMHx of *** who presents today for continued management of ***.    Plan:  1) ***:    Work up:    -***  Medications:    -***  Therapies:    -***  Interventions:    -***    2) ***:    Work up:    -***  Medications:    -***  Therapies:    -***  Interventions:    -***    3) ***:    Work up:    -***  Medications:    -***  Therapies:    -***  Interventions:    -***    Follow up: ***    Toño Quiñones MD    Department of Anesthesiology  Pain Management Division  Answers for HPI/ROS submitted by the patient on 1/8/2020   IRLANDA 7 TOTAL SCORE: 1

## 2020-01-10 ENCOUNTER — TELEPHONE (OUTPATIENT)
Dept: PHARMACY | Facility: CLINIC | Age: 35
End: 2020-01-10

## 2020-01-10 NOTE — TELEPHONE ENCOUNTER
Pt came to the pharmacy for refills.   Patient will   8 prescriptions on 01/10/20      Last Filled on: 12/19/19   Follow-up Date: 02/06/20    Marlin Wise CPhT  [facility]  [phone]

## 2020-01-15 LAB — PAIN DRUG SCR UR W RPTD MEDS: NORMAL

## 2020-01-28 ENCOUNTER — TELEPHONE (OUTPATIENT)
Dept: PHARMACY | Facility: CLINIC | Age: 35
End: 2020-01-28

## 2020-01-28 NOTE — TELEPHONE ENCOUNTER
LM on voice mail for pt to call me. Would like to review medication adherence, make follow up appts.    Donna Serrato, [facility] Pharmacist.   [phone]

## 2020-02-04 ENCOUNTER — TELEPHONE (OUTPATIENT)
Dept: PHARMACY | Facility: CLINIC | Age: 35
End: 2020-02-04

## 2020-02-04 NOTE — TELEPHONE ENCOUNTER
LM via text for pt to call me. Lost to follow up.    Donna Serrato, [facility] Pharmacist.   [phone]

## 2020-02-05 ENCOUNTER — ALLIED HEALTH/NURSE VISIT (OUTPATIENT)
Dept: PHARMACY | Facility: CLINIC | Age: 35
End: 2020-02-05
Payer: COMMERCIAL

## 2020-02-05 DIAGNOSIS — B20 HUMAN IMMUNODEFICIENCY VIRUS (HIV) DISEASE (H): Primary | ICD-10-CM

## 2020-02-05 PROCEDURE — 99207 ZZC NO CHARGE LOS: CPT | Performed by: PHARMACIST

## 2020-02-05 NOTE — PROGRESS NOTES
Therapy Management:                                                    Bella Fung is a 34 year old female called for a therapy management visit.     Reason for Consult: Medication adherence ck/Needs follow up.    Discussion: Bella reports she continues to take Tivicay and Descovy once per day. Reports she has a supply of medication because she refills early every month, so she has built up a supp/y. Reports she tolerates these medications well. Reports she has doctor appointments coming up, but she lost her job and does not have any insurance at this time. Reports she will come in on Monday to see Hugo to get her insurance straightened out.    Plan:  1. Discussed with her that many times when people lose their insurance they will take their meds every other day, or less, so they last longer. Discussed the importance of not doing this to avoid developing resistance. Bella agreed.  2. Encouraged continued medication adherence.  3. Discussed the benefits of achieving and maintaining an undetectable viral load.    Pt agreed to come in on Monday to see Hugo about insurance.    Donna Serrato, [facility] Pharmacist.   [phone]

## 2020-02-06 ENCOUNTER — TELEPHONE (OUTPATIENT)
Dept: PHARMACY | Facility: CLINIC | Age: 35
End: 2020-02-06

## 2020-02-06 NOTE — TELEPHONE ENCOUNTER
Spoke to pt. She currently doesn't have insurance but has plenty of medication on hand. She will be in Monday to talk to Hugo Jenkins about her medical.    Marlin Wise CPhT  [facility]  [phone]

## 2020-02-24 DIAGNOSIS — B20 HUMAN IMMUNODEFICIENCY VIRUS (HIV) DISEASE (H): ICD-10-CM

## 2020-02-24 DIAGNOSIS — G89.4 CHRONIC PAIN SYNDROME: ICD-10-CM

## 2020-02-24 NOTE — TELEPHONE ENCOUNTER
[facility] Call Center    Phone Message    May a detailed message be left on voicemail: yes     Reason for Call: Medication Refill Request    Has the patient contacted the pharmacy for the refill? Yes   Name of medication being requested: oxyCODONE-acetaminophen (PERCOCET) 5-325 MG tablet; AND traMADol (ULTRAM) 50 MG tablet  Provider who prescribed the medication: Dr Rosenbaum   Pharmacy: [facility]  Date medication is needed: ASAP - out of these two pain meds and needs as soon as you can please      Action Taken: Message routed to:  [facility]    Travel Screening: Not Applicable

## 2020-02-25 NOTE — TELEPHONE ENCOUNTER
[facility] Call Center    Phone Message    May a detailed message be left on voicemail: no     Reason for Call: Other: Pt says she is out of her meds and is wondering if anyone can sign for the refill while Dr. Rosenbaum is out. Please call Pt back.     Action Taken: Message routed to:  [facility]    Travel Screening: Not Applicable

## 2020-02-25 NOTE — TELEPHONE ENCOUNTER
I called and LVM for the pt informing her that we received her refill request and have forwarded the message to Dr. Rosenbaum to review and sign if applicable.    If you have any further questions or concerns you can call us back at [phone].    Wen Bray, [facility]

## 2020-02-26 ENCOUNTER — TELEPHONE (OUTPATIENT)
Dept: PHARMACY | Facility: CLINIC | Age: 35
End: 2020-02-26

## 2020-02-26 DIAGNOSIS — B20 HUMAN IMMUNODEFICIENCY VIRUS (HIV) DISEASE (H): Primary | ICD-10-CM

## 2020-02-26 NOTE — TELEPHONE ENCOUNTER
Pt came to the pharmacy after clinic appointment.   Patient will   7 prescriptions on 02/24/20    0 month of on time refill.  (within 6 weeks)    Last Filled on: 01/09/20   Follow-up Date: 03/17/20    Marlin Wise CPhT  [facility]  [phone]

## 2020-02-26 NOTE — PROGRESS NOTES
Per [facility] Ambulatory Care Protocol, Pt is due for routine labs based on disease state or monitoring of medications. Lab orders entered per clinic protocol.  Juany Watson RN

## 2020-02-26 NOTE — TELEPHONE ENCOUNTER
Refill request    Medication:     oxyCODONE-acetaminophen (PERCOCET) 5-325 MG tablet   Take 1-2 tablets by mouth every 6 hours as needed for severe pain - Oral     traMADol (ULTRAM) 50 MG tablet   Take 1 tablet (50 mg) by mouth 3 times daily - Oral         MNPMP Checked: Yes    oxyCODONE-acetaminophen (PERCOCET) 5-325 MG tablet  - Last refilled 1/9/20 for 35 tablets  traMADol (ULTRAM) 50 MG tablet  - Last refilled 1/9/20 for 21 tablets    Refilled: Yes  Full refill: Yes   Partial refill: No Quantity refilled: 35 and 90 for 30 days  Dated to be refilled on: 2/27/20    Last clinic appointment: 1/9/20  Next clinic appointment: 3/12/20    Patient requested to:    Sent to pharmacy     Pt reports that she was without insurance and was unable to afford refills sooner.  Dr. Rosenbaum updated and routed refill for review and authorization

## 2020-02-27 RX ORDER — OXYCODONE AND ACETAMINOPHEN 5; 325 MG/1; MG/1
1-2 TABLET ORAL EVERY 6 HOURS PRN
Qty: 35 TABLET | Refills: 0 | Status: SHIPPED | OUTPATIENT
Start: 2020-02-27 | End: 2020-03-23

## 2020-02-27 RX ORDER — TRAMADOL HYDROCHLORIDE 50 MG/1
50 TABLET ORAL 3 TIMES DAILY
Qty: 90 TABLET | Refills: 0 | Status: SHIPPED | OUTPATIENT
Start: 2020-02-27 | End: 2020-03-23

## 2020-03-11 ENCOUNTER — HEALTH MAINTENANCE LETTER (OUTPATIENT)
Age: 35
End: 2020-03-11

## 2020-03-12 ENCOUNTER — OFFICE VISIT (OUTPATIENT)
Dept: ANESTHESIOLOGY | Facility: CLINIC | Age: 35
End: 2020-03-12
Payer: MEDICAID

## 2020-03-12 VITALS
SYSTOLIC BLOOD PRESSURE: 136 MMHG | RESPIRATION RATE: 16 BRPM | OXYGEN SATURATION: 98 % | HEART RATE: 66 BPM | DIASTOLIC BLOOD PRESSURE: 82 MMHG | WEIGHT: 281 LBS | BODY MASS INDEX: 48.23 KG/M2

## 2020-03-12 DIAGNOSIS — M79.18 MYOFASCIAL PAIN: ICD-10-CM

## 2020-03-12 DIAGNOSIS — G89.4 CHRONIC PAIN SYNDROME: Primary | ICD-10-CM

## 2020-03-12 ASSESSMENT — PAIN SCALES - GENERAL: PAINLEVEL: SEVERE PAIN (7)

## 2020-03-12 NOTE — PROGRESS NOTES
COMPREHENSIVE PAIN CLINIC FOLLOW UP EVALUATION  20  VISIT RECOMMENDATIONS:  - Continue medication regimen as previously prescribed including oxycodone/APAP 5/325 and tramadol 50 mg  - The patient previously expressed interest in engagement with physical therapy as well as pain psychology, however these are not viable options for now given the patient's financial status.  If the patient's financial status improves in the future, these can be revisited    Interval History:  The patient is a 34 year old female with a PMHx of asthma, HIV/AIDS and chronic low back pain who returns to clinic today for continued evaluation of chronic low back pain.  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  Her HIV medications were covered in the interim, but she had to take her pain medications more infrequently and able to make them last until a follow-up visit.  - She has secured a job at another hotel, however the health insurance plan provided through this hotel has a higher premium and higher deductible which is causing financial stress with the patient  - Unrelated to this, she also endorses additional stress related to distressing dreams involving  family members.  Patient is uncertain what relation if any this may have to her current life situation.  - She reports that her pain was reduced during her period of unemployment as she was less physically active, but has returned to approximately the same level as at her previous evaluation now that she has started working again.  - She had previously trialed duloxetine, methocarbamol, and meloxicam, although she does not report any significant benefit from these medications and did not attempt to refill them during her period of lost health insurance coverage.  - She continues to endorse ongoing use of cannabis and the risks associated with  concomitant use of cannabis and opioid medications were again discussed with the patient.  She states that availability of cannabis was helpful during a period when she was taking less opioid medications.  - Her pain is otherwise unchanged in location and character, and she is not experiencing any new or different associated symptoms.    Previous Interval History on 01/09/20:   The patient is a 34 year old female with a PMHx of asthma, HIV/AIDS and chronic low back pain who returns to clinic today for continued evaluation of chronic low back pain.  Since her last visit, the patient reports:  - Her pain is largely unchanged from her previous visit  - She was not able to set up an evaluation by physical therapy, but is interested in doing so  - She attempted to schedule pain psychology with one of the clinics from the list provided but was unable to find one that was accepting new patients  - She started meloxicam after this was cleared by her ID doctor and feels that this is providing a modest benefit  - She endorses using recreational cannabis which she finds beneficial for both pain and sleep.  We discussed the risks associated with concomitant use of multiple medications or recreational drugs with sedating or respiratory depressant effects    Previous recommendations from visit on 01/09/20 include:  - Trial methocarbamol 500mg QID PRN to mitigate sedation associated with tizanidine, but can continue tizanidine at bedtime PRN  - Trial duloxetine 30mg daily which can be increased at follow up if she is not having side effects  - Continue Percocet 5/325 1-2 tablets Q6h PRN #35 (patient takes primarily at night)  - Continue Tramadol 50mg TID PRN #90 (patient takes primarily during daytime)  - Continue meloxicam 7.5mg daily  - Referral placed for PT evaluation and treatment  - UDS performed today    Current Treatments:  - Ibuprofen  - Percocet  - Tramadol    THE 4 A's OF OPIOID MAINTENANCE ANALGESIA    Analgesia:  Reports improved pain control with doses of medication    Activity: She has greater tolerance for activity at work and at home    Adverse effects: denies    Adherence to Rx protocol: No concerns.  Patient continues to use cannabis for pain and anxiety and states she has no intention of stopping this use.  Patient is aware of the risks of concomitant use of opioids and voiced understanding of this.    [place] Board of Pharmacy Data Base Reviewed:    YES; no concerns    Allergies reviewed:   No Known Allergies    Medications reviewed: Pertinent medications reviewed and updated  Current Outpatient Medications   Medication     albuterol (PROVENTIL HFA) 108 (90 Base) MCG/ACT inhaler     dolutegravir (TIVICAY) 50 MG tablet     DULoxetine (CYMBALTA) 30 MG capsule     emtricitabine-tenofovir AF (DESCOVY) 200-25 MG per tablet     ibuprofen (ADVIL/MOTRIN) 600 MG tablet     meloxicam (MOBIC) 7.5 MG tablet     meloxicam (MOBIC) 7.5 MG tablet     oxyCODONE-acetaminophen (PERCOCET) 5-325 MG tablet     sulfamethoxazole-trimethoprim (BACTRIM) 400-80 MG tablet     tiZANidine (ZANAFLEX) 2 MG tablet     traMADol (ULTRAM) 50 MG tablet     No current facility-administered medications for this visit.      Medical history reviewed:   There is no problem list on file for this patient.    Review of Systems:  The 14 system ROS was reviewed from the intake questionnaire; results listed at end of note.    Physical Exam:  /82 (BP Location: Right arm, Patient Position: Chair, Cuff Size: Adult Large)   Pulse 66   Resp 16   Wt 127.5 kg (281 lb)   SpO2 98%   BMI 48.23 kg/m      Physical Exam   Constitutional: Patient is oriented to person, place, and time.  Patient appears well-developed and well-nourished. No distress.   HENT:   Head: Normocephalic and atraumatic.   Eyes: Pupils are equal, round, and reactive to light. EOM are normal. No scleral icterus.   Neck: Normal range of motion. Neck supple.   Cardiovascular: Normal rate and  regular rhythm.   Pulmonary/Chest: Effort normal. No respiratory distress.   Abdominal: deferred  Genitourinary: deferred  Neuromuscular: Gait normal  Neurological: she is alert and oriented to person, place, and time. No cranial nerve deficit. Coordination normal.   Skin: Skin is warm and dry. she is not diaphoretic.   Psychiatric: she has a normal mood and affect. her behavior is normal. Judgment and thought content normal.    Imaging:  None available for review since previous evaluation    Assessment:    The patient is a 34 year old female with PMHx of asthma, HIV/AIDS and chronic low back pain who presents today for continued management of chronic low back pain.  Consistent with previous evaluation, she has myofascial pain with likely concurrent lumbar spondylosis and facet mediated pain.  At this time, we will continue management with oxycodone and tramadol PRN as this continues to provide significant functional improvement for the patient, and as she is already experiencing increased financial burden related to healthcare cost.  If her financial situation improves in the future, would consider trial of additional medications as well as referral for additional multidisciplinary treatment including PT and pain psychology.  As has been noted at previous visits, the patient continues to utilize recreational cannabis for treatment for pain and anxiety and has been thoroughly apprised of associated risks with his use.    Plan:  1) Low back pain:  As noted at her previous evaluation, this is likely due to myofascial pain with some component of underlying facet mediated pain.  Addition of complementary medications for chronic pain and initiation of pain PT will likely provide significant functional benefit and potential reduce her need for opioid medications, however her ability to engage in these therapies is limited present by financial constraints.  She continues to use recreational cannabis which she states  provides benefit for both pain and sleep and has been thoroughly apprised of the risk of this use.  As she has been on a stable regimen and using recreational cannabis for some time, the risk for continuing her current regimen is minimal. Long term therapeutic goals continue to include a reduction in opioid medication.    Work up:    - None at this time    Medications:    - Continue tizanidine 2-4mg at night PRN    - Continue percocet 5/325 Q6h PRN #35 for 30 days    - Continue tramadol 50mg TID PRN #90 for 30 days    Therapies:    - None at this time, however will plan for future referral to pain PT and pain psychology if patient is in a position where these are financially viable options    Interventions:    - None at this time as patient does not wish to pursue this modality of treatment.  These could be considered in the future if the patient is interested in pursuing them.    Follow up: 8 weeks    Toño Quiñones MD    Department of Anesthesiology  Pain Management Division

## 2020-03-12 NOTE — PATIENT INSTRUCTIONS
Medications:    Please call 1 week before you are due for refills on your medications with us    When calling in for refills for your Opioid Medication- You MUST call (Or MyChart) the clinic DIRECTLY, and at least 7 days before you are needing your Medication refill.        Recommended Follow up:  3 months        To speak with a nurse, schedule/reschedule/cancel a clinic appointment, or request a medication refill call: [phone]    You can also reach us by [employer]: [email or web address]    Please provide the clinic with a minium of 1 week notice, on all prescription refills.

## 2020-03-12 NOTE — LETTER
3/12/2020       RE: Bella Fung  [address]  [facility] [address]     Dear Colleague,    Thank you for referring your patient, Bella Fung, to the [facility] FOR COMPREHENSIVE PAIN MANAGEMENT at [facility]. Please see a copy of my visit note below.    COMPREHENSIVE PAIN CLINIC FOLLOW UP EVALUATION  20  VISIT RECOMMENDATIONS:  - Continue medication regimen as previously prescribed including oxycodone/APAP 5/325 and tramadol 50 mg  - The patient previously expressed interest in engagement with physical therapy as well as pain psychology, however these are not viable options for now given the patient's financial status.  If the patient's financial status improves in the future, these can be revisited    Interval History:  The patient is a 34 year old female with a PMHx of asthma, HIV/AIDS and chronic low back pain who returns to clinic today for continued evaluation of chronic low back pain.  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  Her HIV medications were covered in the interim, but she had to take her pain medications more infrequently and able to make them last until a follow-up visit.  - She has secured a job at another hotel, however the health insurance plan provided through this hotel has a higher premium and higher deductible which is causing financial stress with the patient  - Unrelated to this, she also endorses additional stress related to distressing dreams involving  family members.  Patient is uncertain what relation if any this may have to her current life situation.  - She reports that her pain was reduced during her period of unemployment as she was less physically active, but has returned to approximately the same level as at her previous evaluation now that she has started working again.  - She had  previously trialed duloxetine, methocarbamol, and meloxicam, although she does not report any significant benefit from these medications and did not attempt to refill them during her period of lost health insurance coverage.  - She continues to endorse ongoing use of cannabis and the risks associated with concomitant use of cannabis and opioid medications were again discussed with the patient.  She states that availability of cannabis was helpful during a period when she was taking less opioid medications.  - Her pain is otherwise unchanged in location and character, and she is not experiencing any new or different associated symptoms.    Previous Interval History on 01/09/20:   The patient is a 34 year old female with a PMHx of asthma, HIV/AIDS and chronic low back pain who returns to clinic today for continued evaluation of chronic low back pain.  Since her last visit, the patient reports:  - Her pain is largely unchanged from her previous visit  - She was not able to set up an evaluation by physical therapy, but is interested in doing so  - She attempted to schedule pain psychology with one of the clinics from the list provided but was unable to find one that was accepting new patients  - She started meloxicam after this was cleared by her ID doctor and feels that this is providing a modest benefit  - She endorses using recreational cannabis which she finds beneficial for both pain and sleep.  We discussed the risks associated with concomitant use of multiple medications or recreational drugs with sedating or respiratory depressant effects    Previous recommendations from visit on 01/09/20 include:  - Trial methocarbamol 500mg QID PRN to mitigate sedation associated with tizanidine, but can continue tizanidine at bedtime PRN  - Trial duloxetine 30mg daily which can be increased at follow up if she is not having side effects  - Continue Percocet 5/325 1-2 tablets Q6h PRN #35 (patient takes primarily at night)  -  Continue Tramadol 50mg TID PRN #90 (patient takes primarily during daytime)  - Continue meloxicam 7.5mg daily  - Referral placed for PT evaluation and treatment  - UDS performed today    Current Treatments:  - Ibuprofen  - Percocet  - Tramadol    THE 4 A's OF OPIOID MAINTENANCE ANALGESIA    Analgesia: Reports improved pain control with doses of medication    Activity: She has greater tolerance for activity at work and at home    Adverse effects: denies    Adherence to Rx protocol: No concerns.  Patient continues to use cannabis for pain and anxiety and states she has no intention of stopping this use.  Patient is aware of the risks of concomitant use of opioids and voiced understanding of this.    [place] Board of Pharmacy Data Base Reviewed:    YES; no concerns    Allergies reviewed:   No Known Allergies    Medications reviewed: Pertinent medications reviewed and updated  Current Outpatient Medications   Medication     albuterol (PROVENTIL HFA) 108 (90 Base) MCG/ACT inhaler     dolutegravir (TIVICAY) 50 MG tablet     DULoxetine (CYMBALTA) 30 MG capsule     emtricitabine-tenofovir AF (DESCOVY) 200-25 MG per tablet     ibuprofen (ADVIL/MOTRIN) 600 MG tablet     meloxicam (MOBIC) 7.5 MG tablet     meloxicam (MOBIC) 7.5 MG tablet     oxyCODONE-acetaminophen (PERCOCET) 5-325 MG tablet     sulfamethoxazole-trimethoprim (BACTRIM) 400-80 MG tablet     tiZANidine (ZANAFLEX) 2 MG tablet     traMADol (ULTRAM) 50 MG tablet     No current facility-administered medications for this visit.      Medical history reviewed:   There is no problem list on file for this patient.    Review of Systems:  The 14 system ROS was reviewed from the intake questionnaire; results listed at end of note.    Physical Exam:  /82 (BP Location: Right arm, Patient Position: Chair, Cuff Size: Adult Large)   Pulse 66   Resp 16   Wt 127.5 kg (281 lb)   SpO2 98%   BMI 48.23 kg/m      Physical Exam   Constitutional: Patient is oriented to  person, place, and time.  Patient appears well-developed and well-nourished. No distress.   HENT:   Head: Normocephalic and atraumatic.   Eyes: Pupils are equal, round, and reactive to light. EOM are normal. No scleral icterus.   Neck: Normal range of motion. Neck supple.   Cardiovascular: Normal rate and regular rhythm.   Pulmonary/Chest: Effort normal. No respiratory distress.   Abdominal: deferred  Genitourinary: deferred  Neuromuscular: Gait normal  Neurological: she is alert and oriented to person, place, and time. No cranial nerve deficit. Coordination normal.   Skin: Skin is warm and dry. she is not diaphoretic.   Psychiatric: she has a normal mood and affect. her behavior is normal. Judgment and thought content normal.    Imaging:  None available for review since previous evaluation    Assessment:    The patient is a 34 year old female with PMHx of asthma, HIV/AIDS and chronic low back pain who presents today for continued management of chronic low back pain.  Consistent with previous evaluation, she has myofascial pain with likely concurrent lumbar spondylosis and facet mediated pain.  At this time, we will continue management with oxycodone and tramadol PRN as this continues to provide significant functional improvement for the patient, and as she is already experiencing increased financial burden related to healthcare cost.  If her financial situation improves in the future, would consider trial of additional medications as well as referral for additional multidisciplinary treatment including PT and pain psychology.  As has been noted at previous visits, the patient continues to utilize recreational cannabis for treatment for pain and anxiety and has been thoroughly apprised of associated risks with his use.    Plan:  1) Low back pain:  As noted at her previous evaluation, this is likely due to myofascial pain with some component of underlying facet mediated pain.  Addition of complementary medications  for chronic pain and initiation of pain PT will likely provide significant functional benefit and potential reduce her need for opioid medications, however her ability to engage in these therapies is limited present by financial constraints.  She continues to use recreational cannabis which she states provides benefit for both pain and sleep and has been thoroughly apprised of the risk of this use.  As she has been on a stable regimen and using recreational cannabis for some time, the risk for continuing her current regimen is minimal. Long term therapeutic goals continue to include a reduction in opioid medication.    Work up:    - None at this time    Medications:    - Continue tizanidine 2-4mg at night PRN    - Continue percocet 5/325 Q6h PRN #35 for 30 days    - Continue tramadol 50mg TID PRN #90 for 30 days    Therapies:    - None at this time, however will plan for future referral to pain PT and pain psychology if patient is in a position where these are financially viable options    Interventions:    - None at this time as patient does not wish to pursue this modality of treatment.  These could be considered in the future if the patient is interested in pursuing them.    Follow up: 8 weeks    Toño Quiñones MD    Department of Anesthesiology  Pain Management Division

## 2020-03-23 ENCOUNTER — MYC REFILL (OUTPATIENT)
Dept: ANESTHESIOLOGY | Facility: CLINIC | Age: 35
End: 2020-03-23

## 2020-03-23 DIAGNOSIS — G89.4 CHRONIC PAIN SYNDROME: ICD-10-CM

## 2020-03-23 DIAGNOSIS — B20 HUMAN IMMUNODEFICIENCY VIRUS (HIV) DISEASE (H): ICD-10-CM

## 2020-03-23 RX ORDER — OXYCODONE AND ACETAMINOPHEN 5; 325 MG/1; MG/1
1-2 TABLET ORAL EVERY 6 HOURS PRN
Qty: 35 TABLET | Refills: 0 | Status: SHIPPED | OUTPATIENT
Start: 2020-03-28 | End: 2020-04-21

## 2020-03-23 RX ORDER — TRAMADOL HYDROCHLORIDE 50 MG/1
50 TABLET ORAL 3 TIMES DAILY
Qty: 90 TABLET | Refills: 0 | Status: SHIPPED | OUTPATIENT
Start: 2020-03-28 | End: 2020-04-21

## 2020-03-23 NOTE — TELEPHONE ENCOUNTER
Refill request    Medication: Percocet and Tramadol      MNPMP Checked: Yes         Percocet 5/325 mg - Last refilled 2/27/20 for 35 tablets  Tramadol 50 mg - Last refilled 2/27/20 for 90 tablets    Refilled: Yes  Full refill: Yes   Partial refill: No   Quantity refilled: Percocet  35 tablets for 30 days  Tramadol 90 tablet for 30 days.   Dated to be refilled on: 3/28/20      Last clinic appointment: 3/12/20 with Dr Rosenbaum.   Recommended a 3 month follow up, this has not been scheduled yet.     Patient requested to: [facility].     Medications were pended per pt's mychart request. LPN verified that they matched the .     Encounter routed to provider to review.

## 2020-03-24 ENCOUNTER — MYC MEDICAL ADVICE (OUTPATIENT)
Dept: INFECTIOUS DISEASES | Facility: CLINIC | Age: 35
End: 2020-03-24

## 2020-03-25 ENCOUNTER — TELEPHONE (OUTPATIENT)
Dept: PHARMACY | Facility: CLINIC | Age: 35
End: 2020-03-25

## 2020-03-25 NOTE — TELEPHONE ENCOUNTER
Called patient for refill reminder.    Patient will   5 prescriptions on 03/28/2020        3 month of on time refill.    Last Filled on:02/24/2020   Follow-up Date: 04/22/2020    Enrique Jiménez  -----------------------------------------------   [email or web address]  Pharmacy Technician  [facility]: [phone]

## 2020-03-26 ENCOUNTER — VIRTUAL VISIT (OUTPATIENT)
Dept: INFECTIOUS DISEASES | Facility: CLINIC | Age: 35
End: 2020-03-26
Attending: COLON & RECTAL SURGERY
Payer: MEDICAID

## 2020-03-26 DIAGNOSIS — B20 HUMAN IMMUNODEFICIENCY VIRUS (HIV) DISEASE (H): Primary | ICD-10-CM

## 2020-03-26 NOTE — PROGRESS NOTES
This patient is being evaluated via a billable telephone visit*.        REASON FOR VISIT: Follow up HIV.     HISTORY OF PRESENT ILLNESS:  Bella Fung is a 35 year old female with HIV/AIDS who was last seen by me in clinic in October. She has missed multiple visits since this time.  She reports that this is primarily due to issues with insurance coverage as well as being extremely busy with work and her family.      Today, she reports that she has done great with her HIV meds over the past few months. She reports missing only a 1-2 doses/week (although she missed more a few weeks ago when her granddaughter was hospitalized and she was very stressed). She denies side effects. No recent illnesses. She has yet to be evaluated for her newly diagnosed diabetes but is trying to eat a healthy diet.      She has continued to be followed in the pain clinic and feels that her pain is being moderately well controlled.  She smokes marijuana daily and thinks this helps.       I have reviewed and updated the patient's Past Medical History, Social History, Family History, allergy and Medication List.      PERTINENT LAB DATA:   Obtained 10/31/19:  CMP normal  CBC remarkable for WBC 2.7  HgbA1c 7.1  HIV RNA 23,598  CD4 37    ASSESSMENT AND RECOMMENDATIONS:  36 yo female with HIV/AIDS, untreated diabetes and long history of medication non-adherence; current phone visit for the purpose of routine follow up.     1. AIDS:  Has struggled with adherence for a prolonged period of time, resulting in very low CD4.  Although she was able to achieve an undetectable VL this past summer, her adherence has overall been poor since this time.  Although she reported relatively good adherence over the past few months, when I talked to her , he reported that they have been sharing a months supply of Dol/descovy between the two of them. We again had a long talk about the risks of poor adherence including the development of drug resistance  and disease progression which will eventually lead to OIs and potentially death. I let her know that it would be better to completely stop ARVs than take them every other day given high likelihood of resistance.  She feels strongly about staying on meds and will no longer share with her  (I will provide him with his own script).  She has no interest in switching to a one pill/once daily regimen.   - Will keep her on Dol/Descovy while awaiting lab results.   - Will check CBC, CMP, HIV RNA, CD4 and roderick/pheno on Monday.   - Will need very close follow up (ie, every other week) until more stable.   - See below for routine HIV care.     2. Diabetes:  HgbA1c 7.1 at last visit. Referred to endocrinology but missed multiple visits. Denies symptoms of untreated diabetes. Working on eating a better diet.   - Rechecking  HgbA1c with next labs.  - Will again refer to endocrinology.   - If she is unable to establish care in their clinic, will consider initiation of metformin.     3. Chronic back pain: Longstanding issue. She has established care in our pain clinic and has been receiving meds through them.   - Continue ongoing pain clinic follow up.      4. Routine HIV care:   A. Prophylaxis:   - Continuing bactrim single strength daily (Toxo IgG negative); she reports good adherence to this.    - Holding off on MAC prophylaxis (want to focus on first getting her adherent to ARVs and bactrim; azithro may be difficult for her to tolerate).    B. Routine ID screening:   - Hep B negative 2012   - Hep C negative 2012   - Quant gold negative 2013   - STIs (GC/chlam/RPR) negative 1/2018, declines repeat testing (jointly monogamous relationship)   - CMV IgG positive   - Toxo IgG negative 2013   C. Vaccination status:   - HAV IgG negative; no indication for vaccination   - HBV immune   - P23 (2012), P13 (2016), pneumovax booster 10/19.   - Tdap 2018   - Declined Influenza vaccine.   - No indication for menely Bauman  "Health:   - No screening indicated at this time.    - On TAF containing regimen.   E. Renal/CV:   - Cr normal   - Will address BP and lipids once HIV under better control   - Diabetes as above.    - Ongoing daily marijuana use; no tobacco use. Will address at follow up.    F. Cancer screening   - Due for pap; will perform once I am able to see her in clinic.       Follow up telephone visit in 2 weeks; sooner if issues arise.     Ashley Noguera MD  [phone]    Length of telephone call: 14 minutes    *The patient has been notified of following:     \"This telephone visit will be conducted via a call between you and your physician/provider. We have found that certain health care needs can be provided without the need for a physical exam.  This service lets us provide the care you need with a short phone conversation.  If a prescription is necessary we can send it directly to your pharmacy.  If lab work is needed we can place an order for that and you can then stop by our lab to have the test done at a later time.    If during the course of the call the physician/provider feels a telephone visit is not appropriate, you will not be charged for this service.\"         "

## 2020-04-14 ENCOUNTER — ALLIED HEALTH/NURSE VISIT (OUTPATIENT)
Dept: PHARMACY | Facility: CLINIC | Age: 35
End: 2020-04-14
Payer: MEDICAID

## 2020-04-14 DIAGNOSIS — B20 HUMAN IMMUNODEFICIENCY VIRUS (HIV) DISEASE (H): Primary | ICD-10-CM

## 2020-04-14 PROCEDURE — 99207 ZZC NO CHARGE LOS: CPT | Performed by: PHARMACIST

## 2020-04-14 NOTE — PROGRESS NOTES
"Clinical Pharmacy Consult:                                                    Bella Fung is a 35 year old female called for a clinical pharmacist consult.  She was referred to me from Dr. Noguera. No MTM due to insurance    Reason for Consult: Medication adherence ck.     Discussion: Bella reports she is taking Tivicay, Descovy and Bactrim. Reports she takes it at night before going to work. 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 Reports missing 4 to 7 doses per month. Says she has a lot gong on in her life and this is what works for her. Reports when she takes these medications consistently she gets a rash in and around her \"butt area\" and it hurts so bad sometimes she can not even wipe andshe sometimes cries it is so bad. Says she is convinced it is from the medications when she takes them consistently. Reports she has a good supply of medication at this time and will keep in touch with the pharmacy. Reports she is doing everything she can to stay safe during Covid-19 outbreak.    Plan:  1. Encouraged better medication adherence. Discussed how missed doses and resistance happens.  2. Discussed the irritation she is getting around her rectal area is mostly likely not due to these medications. I suggested she call when this happens so it can be cultured to see what it is. (I am not sure I convinced her of this, but she said she would call).  3. Reviewed CD4 and viral load and how we would like to see those numbers improve. Discussed benefits of achieving and maintaining an undetectable viral load.  4. Discussed CDC guidelines for staying save during Covid-19 outbreak.     She is asked to call with any questions/concerns.    Donna Serrato, [facility] Pharmacist.   [phone]          "

## 2020-04-15 ENCOUNTER — TELEPHONE (OUTPATIENT)
Dept: PHARMACY | Facility: CLINIC | Age: 35
End: 2020-04-15

## 2020-04-15 NOTE — TELEPHONE ENCOUNTER
I spoke with patient and she doesn't want us to fill anything until we get new Rx's for her pain meds.  Next pain med fill should be around 04/28    Last filled: 03/25/2020    Next Call Date: 04/28/2020    Enrique Jiménez  -----------------------------------------------   [email or web address]  Pharmacy Technician  [facility]: [phone]

## 2020-04-20 ENCOUNTER — MYC REFILL (OUTPATIENT)
Dept: INFECTIOUS DISEASES | Facility: CLINIC | Age: 35
End: 2020-04-20

## 2020-04-20 DIAGNOSIS — B20 HUMAN IMMUNODEFICIENCY VIRUS (HIV) DISEASE (H): ICD-10-CM

## 2020-04-21 DIAGNOSIS — G89.4 CHRONIC PAIN SYNDROME: ICD-10-CM

## 2020-04-21 DIAGNOSIS — B20 HUMAN IMMUNODEFICIENCY VIRUS (HIV) DISEASE (H): ICD-10-CM

## 2020-04-21 RX ORDER — TRAMADOL HYDROCHLORIDE 50 MG/1
50 TABLET ORAL 3 TIMES DAILY
Qty: 90 TABLET | Refills: 0 | Status: SHIPPED | OUTPATIENT
Start: 2020-04-25 | End: 2020-05-27

## 2020-04-21 RX ORDER — EMTRICITABINE AND TENOFOVIR ALAFENAMIDE 200; 25 MG/1; MG/1
1 TABLET ORAL DAILY
Qty: 30 TABLET | Refills: 0 | Status: SHIPPED | OUTPATIENT
Start: 2020-04-21 | End: 2020-05-21

## 2020-04-21 RX ORDER — DOLUTEGRAVIR SODIUM 50 MG/1
50 TABLET, FILM COATED ORAL DAILY
Qty: 30 TABLET | Refills: 0 | Status: SHIPPED | OUTPATIENT
Start: 2020-04-21 | End: 2020-05-21

## 2020-04-21 RX ORDER — SULFAMETHOXAZOLE AND TRIMETHOPRIM 400; 80 MG/1; MG/1
1 TABLET ORAL DAILY
Qty: 30 TABLET | Refills: 0 | Status: SHIPPED | OUTPATIENT
Start: 2020-04-21 | End: 2020-05-21

## 2020-04-21 RX ORDER — OXYCODONE AND ACETAMINOPHEN 5; 325 MG/1; MG/1
1-2 TABLET ORAL EVERY 6 HOURS PRN
Qty: 35 TABLET | Refills: 0 | Status: SHIPPED | OUTPATIENT
Start: 2020-04-27 | End: 2020-05-27

## 2020-04-21 NOTE — TELEPHONE ENCOUNTER
Refill request    Medication:     oxyCODONE-acetaminophen (PERCOCET) 5-325 MG tablet   Take 1-2 tablets by mouth every 6 hours as needed for severe pain - Oral     traMADol (ULTRAM) 50 MG tablet   Take 1 tablet (50 mg) by mouth 3 times daily - Oral     MNPMP Checked: Yes    oxyCODONE-acetaminophen (PERCOCET) 5-325 MG tablet - Last refilled 3/28/20 for 35 tablets  traMADol (ULTRAM) 50 MG tablet  - Last refilled 3/26/20 for 90 tablets    Refilled: Yes  Full refill: Yes   Partial refill: No Quantity refilled:     Percocet 35 tablets for 30 days    Tramadol 90 tablets for 30 days     Dated to be refilled on:     Percocet 4/27/20    Tramadol 4/25/20    Last clinic appointment: 3/12/20  Next clinic appointment: Pt was instructed at last clinic visit to follow up in 3 months.  Pt will be reminded to schedule follow up for June    Patient requested to:    Sent to pharmacy

## 2020-04-29 ENCOUNTER — TELEPHONE (OUTPATIENT)
Dept: PHARMACY | Facility: CLINIC | Age: 35
End: 2020-04-29

## 2020-04-29 NOTE — TELEPHONE ENCOUNTER
Called patient for refill reminder.    Patient will  5 prescriptions on 04/29/2020    4 month of on time refill.    Last Filled on:03/25/2020   Follow-up Date: 05/21/2020    Enrique Jiménez  -----------------------------------------------   [email or web address]  Pharmacy Technician  [facility]: [phone]

## 2020-05-13 ENCOUNTER — TELEPHONE (OUTPATIENT)
Dept: PHARMACY | Facility: CLINIC | Age: 35
End: 2020-05-13

## 2020-05-13 NOTE — TELEPHONE ENCOUNTER
Called patient to follow-up on medication adherence.  Patient reports she is very sleepy right now as she works the night shift and just got off of work.  Patient reports she would like me to call her tomorrow morning sometime.  Reports she did get a bottle of medication in the mail.    I will call her again tomorrow morning    Donna Serrato, [facility] Pharmacist.   [phone]

## 2020-05-14 ENCOUNTER — ALLIED HEALTH/NURSE VISIT (OUTPATIENT)
Dept: PHARMACY | Facility: CLINIC | Age: 35
End: 2020-05-14
Payer: COMMERCIAL

## 2020-05-14 DIAGNOSIS — B20 HUMAN IMMUNODEFICIENCY VIRUS (HIV) DISEASE (H): Primary | ICD-10-CM

## 2020-05-14 PROCEDURE — 99207 ZZC NO CHARGE LOS: CPT | Performed by: PHARMACIST

## 2020-05-14 NOTE — PROGRESS NOTES
Clinical Pharmacy Consult:                                                    Bella Fung is a 35 year old female called for a clinical pharmacist consult.     Reason for Consult: Follow-up on medication adherence from 4/14/2020.    Discussion: Bella reports she continues to take Tivicay, Descovy, and Bactrim.  However, she continues to miss  2-3 doses per week.  Again, she reports she has a lot going on and she also believes she gets side effects from these medications.  Reports she feels the medications cause depression, and when she takes the medication for many days in a row she gets a very bad rash in and around her butt area and it hurts so bad.  Reports she is also concerned because she was told by Dr. Noguera she has diabetes.  Reports she drinks pop with sugar, and juice. Reports being very thirsty and is urinating a lot.  Reports that she has not followed up concerning her diabetes, due to she just did not want to deal with it and was praying about it and hoping that God would take it away.  Reports that she is ready now to deal with her diabetes and would like an appointment with a primary care doctor.  Also, she will think about switching to another HIV regimen which would be a 1 pill once a day regimen.    Plan:  1.  Again, encouraged better medication adherence.  Discussed with her that maybe switching to a different regimen would help with the side effects she believes she is getting from her current medication.  In general, we discussed the medication called Biktarvy.  She will think about this and let me know.  If she is interested in switching I can get in touch with Dr. Noguera about this.  2.  Discussed with her at length how diabetes negatively affects the body.  Discussed how making dietary changes can help.  I will inquire to see if we can get her set up with primary care at this time.      Donna Serrato, [facility] Pharmacist.   [phone]

## 2020-05-15 ENCOUNTER — TELEPHONE (OUTPATIENT)
Dept: ENDOCRINOLOGY | Facility: CLINIC | Age: 35
End: 2020-05-15

## 2020-05-15 NOTE — TELEPHONE ENCOUNTER
Spoke to patient. She will call back to schedule.    Ok to schedule 1st available NEW virtual visit, or 1st available NDI in clinic appointment for July or later.

## 2020-05-15 NOTE — TELEPHONE ENCOUNTER
----- Message from Mar Llamas sent at 5/15/2020  8:32 AM CDT -----  Regarding: FW: Call patient to schedule    ----- Message -----  From: Tracey Luis MA  Sent: 5/14/2020   3:00 PM CDT  To: Mar Llamas  Subject: RE: Call patient to schedule                     Spoke with her briefly. She would like a call back tomorrow to schedule   ----- Message -----  From: Mar Llamas  Sent: 5/14/2020   2:04 PM CDT  To: Tracey Luis MA  Subject: FW: Call patient to schedule                     Chart says she needs to speak to clinic mg before schedule due to multiple no-show'd consult clinic appts  ----- Message -----  From: Lu Woodard  Sent: 5/14/2020  11:17 AM CDT  To: Clinic [id number]  Subject: Call patient to schedule                         Hello,  This patient would like to schedule an appointment in the Endocrinology clinic. Patient has had two appointment's scheduled in the past but they were no shows. Patient would appreciate if someone could reach out to her to schedule?  Thank you,  Lu

## 2020-05-21 ENCOUNTER — TELEPHONE (OUTPATIENT)
Dept: PHARMACY | Facility: CLINIC | Age: 35
End: 2020-05-21

## 2020-05-21 DIAGNOSIS — B20 HUMAN IMMUNODEFICIENCY VIRUS (HIV) DISEASE (H): ICD-10-CM

## 2020-05-21 RX ORDER — EMTRICITABINE AND TENOFOVIR ALAFENAMIDE 200; 25 MG/1; MG/1
TABLET ORAL
Qty: 30 TABLET | Refills: 0 | Status: SHIPPED | OUTPATIENT
Start: 2020-05-21 | End: 2020-07-01

## 2020-05-21 RX ORDER — DOLUTEGRAVIR SODIUM 50 MG/1
TABLET, FILM COATED ORAL
Qty: 30 TABLET | Refills: 0 | Status: SHIPPED | OUTPATIENT
Start: 2020-05-21 | End: 2020-07-01

## 2020-05-21 RX ORDER — SULFAMETHOXAZOLE AND TRIMETHOPRIM 400; 80 MG/1; MG/1
TABLET ORAL
Qty: 30 TABLET | Refills: 0 | Status: SHIPPED | OUTPATIENT
Start: 2020-05-21 | End: 2020-07-01

## 2020-05-21 NOTE — TELEPHONE ENCOUNTER
Notified alexus Arias, MSW, that we need updated labs on pt due to elevated VL at last check.  Juany Watson RN

## 2020-05-21 NOTE — TELEPHONE ENCOUNTER
Called patient for refill reminder.    Patient will   5 prescriptions on 05/26/20    3 month of on time refill.    Last Filled on: 04/27/20  (picked up)   Follow-up Date: 06/23/20    Marlin Wise CPhT  [facility]  [phone]

## 2020-05-27 DIAGNOSIS — G89.4 CHRONIC PAIN SYNDROME: ICD-10-CM

## 2020-05-27 DIAGNOSIS — B20 HUMAN IMMUNODEFICIENCY VIRUS (HIV) DISEASE (H): ICD-10-CM

## 2020-05-27 RX ORDER — OXYCODONE AND ACETAMINOPHEN 5; 325 MG/1; MG/1
1-2 TABLET ORAL EVERY 6 HOURS PRN
Qty: 35 TABLET | Refills: 0 | Status: SHIPPED | OUTPATIENT
Start: 2020-05-27 | End: 2020-07-02

## 2020-05-27 RX ORDER — TRAMADOL HYDROCHLORIDE 50 MG/1
50 TABLET ORAL 3 TIMES DAILY
Qty: 90 TABLET | Refills: 0 | Status: SHIPPED | OUTPATIENT
Start: 2020-05-27 | End: 2020-07-02

## 2020-05-27 NOTE — TELEPHONE ENCOUNTER
Refill request    Medication:     oxyCODONE-acetaminophen (PERCOCET) 5-325 MG tablet   Take 1-2 tablets by mouth every 6 hours as needed for severe pain - Oral     traMADol (ULTRAM) 50 MG tablet   Take 1 tablet (50 mg) by mouth 3 times daily - Oral       MNPMP Checked: Yes    oxyCODONE-acetaminophen (PERCOCET) 5-325 MG tablet  - Last refilled 4/27/20 for 35 tablets  traMADol (ULTRAM) 50 MG tablet  - Last refilled 3/ for 90 tablets    Refilled: Yes  Full refill: Yes   Partial refill: No Quantity refilled: 35 percocet and 90 tramadol for 30 days  Dated to be refilled on: 5/27/20    Last clinic appointment: 3/12/20  Next clinic appointment: 5/29/20    Patient requested to:    Sent to pharmacy

## 2020-05-29 ENCOUNTER — VIRTUAL VISIT (OUTPATIENT)
Dept: ANESTHESIOLOGY | Facility: CLINIC | Age: 35
End: 2020-05-29
Payer: COMMERCIAL

## 2020-05-29 DIAGNOSIS — G89.29 CHRONIC MYOFASCIAL PAIN: ICD-10-CM

## 2020-05-29 DIAGNOSIS — M79.18 CHRONIC MYOFASCIAL PAIN: ICD-10-CM

## 2020-05-29 RX ORDER — METHOCARBAMOL 750 MG/1
750 TABLET, FILM COATED ORAL 4 TIMES DAILY PRN
Qty: 90 TABLET | Refills: 2 | Status: SHIPPED | OUTPATIENT
Start: 2020-05-29 | End: 2020-07-02

## 2020-05-29 ASSESSMENT — PAIN SCALES - GENERAL: PAINLEVEL: SEVERE PAIN (6)
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
## 2020-05-29 NOTE — PATIENT INSTRUCTIONS
Medications:    Continue medications as prescribed.     Tramadol and Percocet were refilled on 5/27/20.    methocarbamol (ROBAXIN) 750 MG tablet- Take 1 tablet (750 mg) by mouth 4 times daily as needed for muscle spasms    When calling in for refills for your opioid medication- You MUST call (Or MyChart) the clinic DIRECTLY and at least 7 days before you are needing your medication refilled.    Recommended Follow up:  2 months with Dr. Rosenbaum. PLEASE call to schedule your appointment.        To speak with a nurse, schedule/reschedule/cancel a clinic appointment, or request a medication refill call: [phone]    You can also reach us by [employer]: [email or web address]
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
## 2020-05-29 NOTE — PROGRESS NOTES
"Bella Fung is a 35 year old female who is being evaluated via a billable video visit.      The patient has been notified of following:     \"This video visit will be conducted via a call between you and your physician/provider. We have found that certain health care needs can be provided without the need for an in-person physical exam.  This service lets us provide the care you need with a video conversation.  If a prescription is necessary we can send it directly to your pharmacy.  If lab work is needed we can place an order for that and you can then stop by our lab to have the test done at a later time.    Video visits are billed at different rates depending on your insurance coverage.  Please reach out to your insurance provider with any questions.    If during the course of the call the physician/provider feels a video visit is not appropriate, you will not be charged for this service.\"    Patient has given verbal consent for Video visit? Yes    How would you like to obtain your AVS? [place]    Patient would like the video invitation sent by: Text to cell phone: [phone]    Will anyone else be joining your video visit? No        Wen Bray CMA        "

## 2020-06-23 ENCOUNTER — TELEPHONE (OUTPATIENT)
Dept: ANESTHESIOLOGY | Facility: CLINIC | Age: 35
End: 2020-06-23

## 2020-07-01 ENCOUNTER — MYC REFILL (OUTPATIENT)
Dept: INFECTIOUS DISEASES | Facility: CLINIC | Age: 35
End: 2020-07-01

## 2020-07-01 DIAGNOSIS — B20 HUMAN IMMUNODEFICIENCY VIRUS (HIV) DISEASE (H): ICD-10-CM

## 2020-07-01 DIAGNOSIS — G89.4 CHRONIC PAIN SYNDROME: ICD-10-CM

## 2020-07-01 DIAGNOSIS — G89.29 CHRONIC MYOFASCIAL PAIN: ICD-10-CM

## 2020-07-01 DIAGNOSIS — M79.18 CHRONIC MYOFASCIAL PAIN: ICD-10-CM

## 2020-07-01 NOTE — TELEPHONE ENCOUNTER
[facility] Call Center    Phone Message    May a detailed message be left on voicemail: yes     Reason for Call: pt is still waiting for her medications. Please call her when the medications have been sent to the pharmacy. She is waiting for Robaxin, Tramadol and Oxycodone. Please call pt when the medications have been sent to the pharmacy.    Action Taken: Message routed to:  [facility] pain clinic    Travel Screening: Not Applicable

## 2020-07-02 RX ORDER — EMTRICITABINE AND TENOFOVIR ALAFENAMIDE 200; 25 MG/1; MG/1
1 TABLET ORAL DAILY
Qty: 30 TABLET | Refills: 0 | Status: SHIPPED | OUTPATIENT
Start: 2020-07-02 | End: 2020-08-05

## 2020-07-02 RX ORDER — DOLUTEGRAVIR SODIUM 50 MG/1
TABLET, FILM COATED ORAL
Qty: 30 TABLET | Refills: 0 | OUTPATIENT
Start: 2020-07-02

## 2020-07-02 RX ORDER — TRAMADOL HYDROCHLORIDE 50 MG/1
50 TABLET ORAL 3 TIMES DAILY
Qty: 90 TABLET | Refills: 0 | Status: SHIPPED | OUTPATIENT
Start: 2020-07-02 | End: 2020-07-29

## 2020-07-02 RX ORDER — DOLUTEGRAVIR SODIUM 50 MG/1
50 TABLET, FILM COATED ORAL DAILY
Qty: 30 TABLET | Refills: 0 | Status: SHIPPED | OUTPATIENT
Start: 2020-07-02 | End: 2020-08-05

## 2020-07-02 RX ORDER — METHOCARBAMOL 750 MG/1
750 TABLET, FILM COATED ORAL 4 TIMES DAILY PRN
Qty: 90 TABLET | Refills: 2 | Status: SHIPPED | OUTPATIENT
Start: 2020-07-02 | End: 2020-07-29

## 2020-07-02 RX ORDER — OXYCODONE AND ACETAMINOPHEN 5; 325 MG/1; MG/1
1-2 TABLET ORAL EVERY 6 HOURS PRN
Qty: 35 TABLET | Refills: 0 | Status: SHIPPED | OUTPATIENT
Start: 2020-07-02 | End: 2020-07-29

## 2020-07-02 RX ORDER — SULFAMETHOXAZOLE AND TRIMETHOPRIM 400; 80 MG/1; MG/1
1 TABLET ORAL DAILY
Qty: 30 TABLET | Refills: 0 | Status: SHIPPED | OUTPATIENT
Start: 2020-07-02 | End: 2020-08-05

## 2020-07-02 RX ORDER — SULFAMETHOXAZOLE AND TRIMETHOPRIM 400; 80 MG/1; MG/1
TABLET ORAL
Qty: 30 TABLET | Refills: 0 | OUTPATIENT
Start: 2020-07-02

## 2020-07-02 RX ORDER — EMTRICITABINE AND TENOFOVIR ALAFENAMIDE 200; 25 MG/1; MG/1
TABLET ORAL
Qty: 30 TABLET | Refills: 0 | OUTPATIENT
Start: 2020-07-02

## 2020-07-02 NOTE — TELEPHONE ENCOUNTER
Refill request    Medication:     oxyCODONE-acetaminophen (PERCOCET) 5-325 MG tablet   Take 1-2 tablets by mouth every 6 hours as needed for severe pain - Oral     traMADol (ULTRAM) 50 MG tablet   Take 1 tablet (50 mg) by mouth 3 times daily - Oral       MNPMP Checked: Yes    oxyCODONE-acetaminophen (PERCOCET) 5-325 MG tablet  - Last refilled 5/28/20 for 90 tablets  traMADol (ULTRAM) 50 MG tablet  - Last refilled 5/28/20 for 35 tablets    Refilled: Yes  Full refill: Yes   Partial refill: No   Dated to be refilled on: 7/2/20    Last clinic appointment: 5/29/20  Next clinic appointment: 9/17/20    Patient requested to:    Sent to pharmacy

## 2020-07-02 NOTE — TELEPHONE ENCOUNTER
Per discussion with Dr. Nogeura, OK to refill bactrim, descovy and tivicay for 1 more month. Information given to patient and Hugo, , that pt needs B20 labs done soon.     Colleen Escobar RN

## 2020-07-17 ENCOUNTER — TELEPHONE (OUTPATIENT)
Dept: PHARMACY | Facility: CLINIC | Age: 35
End: 2020-07-17

## 2020-07-29 ENCOUNTER — MYC REFILL (OUTPATIENT)
Dept: ANESTHESIOLOGY | Facility: CLINIC | Age: 35
End: 2020-07-29

## 2020-07-29 DIAGNOSIS — G89.29 CHRONIC MYOFASCIAL PAIN: ICD-10-CM

## 2020-07-29 DIAGNOSIS — G89.4 CHRONIC PAIN SYNDROME: ICD-10-CM

## 2020-07-29 DIAGNOSIS — M79.18 CHRONIC MYOFASCIAL PAIN: ICD-10-CM

## 2020-07-29 DIAGNOSIS — B20 HUMAN IMMUNODEFICIENCY VIRUS (HIV) DISEASE (H): ICD-10-CM

## 2020-07-30 ENCOUNTER — TELEPHONE (OUTPATIENT)
Dept: ANESTHESIOLOGY | Facility: CLINIC | Age: 35
End: 2020-07-30

## 2020-07-30 ENCOUNTER — VIRTUAL VISIT (OUTPATIENT)
Dept: INFECTIOUS DISEASES | Facility: CLINIC | Age: 35
End: 2020-07-30
Attending: COLON & RECTAL SURGERY
Payer: COMMERCIAL

## 2020-07-30 DIAGNOSIS — B20 AIDS (ACQUIRED IMMUNE DEFICIENCY SYNDROME) (H): Primary | ICD-10-CM

## 2020-07-30 ASSESSMENT — PAIN SCALES - GENERAL: PAINLEVEL: NO PAIN (0)

## 2020-07-30 NOTE — PROGRESS NOTES
"Bella Fung is a 35 year old female who is being evaluated via a billable telephone visit.      The patient has been notified of following:     \"This telephone visit will be conducted via a call between you and your physician/provider. We have found that certain health care needs can be provided without the need for a physical exam.  This service lets us provide the care you need with a short phone conversation.  If a prescription is necessary we can send it directly to your pharmacy.  If lab work is needed we can place an order for that and you can then stop by our lab to have the test done at a later time.    Telephone visits are billed at different rates depending on your insurance coverage. During this emergency period, for some insurers they may be billed the same as an in-person visit.  Please reach out to your insurance provider with any questions.    If during the course of the call the physician/provider feels a telephone visit is not appropriate, you will not be charged for this service.\"    Patient has given verbal consent for Telephone visit?  Yes    What phone number would you like to be contacted at? [phone]    How would you like to obtain your AVS? [facility]    Phone call duration: 18 minutes    Ashley Noguera MD      "

## 2020-07-30 NOTE — LETTER
"7/30/2020       RE: Bella Fung  [address]  [facility] [address]     Dear Colleague,    Thank you for referring your patient, Bella Fung, to the [facility] AND INFECTIOUS DISEASES at [facility]. Please see a copy of my visit note below.    Bella Fung is a 35 year old female who is being evaluated via a billable telephone visit.      The patient has been notified of following:     \"This telephone visit will be conducted via a call between you and your physician/provider. We have found that certain health care needs can be provided without the need for a physical exam.  This service lets us provide the care you need with a short phone conversation.  If a prescription is necessary we can send it directly to your pharmacy.  If lab work is needed we can place an order for that and you can then stop by our lab to have the test done at a later time.    Telephone visits are billed at different rates depending on your insurance coverage. During this emergency period, for some insurers they may be billed the same as an in-person visit.  Please reach out to your insurance provider with any questions.    If during the course of the call the physician/provider feels a telephone visit is not appropriate, you will not be charged for this service.\"    Patient has given verbal consent for Telephone visit?  Yes    What phone number would you like to be contacted at? [phone]    How would you like to obtain your AVS? [facility]    Phone call duration: 18 minutes    Ashley Noguera MD        REASON FOR VISIT: Follow up HIV.     HISTORY OF PRESENT ILLNESS:  Bella Fung is a 35 year old female with HIV/AIDS who was last seen by me in clinic in October. She has missed multiple visits since this time.   Today, she tells me that she is not doing very well.  She is very worried about her . He also has HIV and she thinks his disease is " progressing. He is losing a lot of weight and has bothersome urinary symptoms. He has also been drinking more heavily lately. She is scared that he is not taking his health issues seriously and would like me to schedule him for a visit ASAP.     Bella reports physically feeling okay. She has good energy and has been trying to eat more healthy foods.  She is unfortunately not taking her ARVs regularly. She takes them about once a week but otherwise gives them to her . She thinks he would benefit from them more than her and also thinks they make her feel depressed. The only meds she is taking now are her pain meds. She feels that she has been too busy dealing with work and her children to take care of her own health.     She reports doing a good job with Covid-19 preventative measures.  She has not had any recent symptoms concerning for Covid. She recently thought her  had Covid (he was coughing and had chest pain) but no one else in the family got sick and he recovered quite quickly.     I have reviewed and updated the patient's Past Medical History, Social History, Family History, allergy and Medication List.    PERTINENT LAB DATA:   Obtained 10/31/19:  CMP normal  CBC remarkable for WBC 2.7  HgbA1c 7.1  HIV RNA 23,598  CD4 37    ASSESSMENT AND RECOMMENDATIONS:  36 yo female with HIV/AIDS, complicated by non-adherence, presenting for follow up.     1. HIV:  Bella has unfortunately continued to struggle with adherence. Although we have talked multiple times in the past about risk of taking ARVs intermittently, she is still doing this. She is able to explain to my why this is dangerous (resistance) but is not sure why she still does it. I again explained that taking no therapy at all would be better than once/week and advised her to stop completely until we can get her in for labs and talk strategize a plan for adherence. I am very concerned about her risk of AIDS complications (including death) if we  cannot get her on stable ARVs.    - Advised her to stop Dol/Descovy until I see her in clinic next week.   - Will check CBC, CMP, HIV RNA, CD4 and roderick/pheno.    - See below for routine HIV care.     2. Diabetes:  HgbA1c 7.1 at last visit. Referred to endocrinology but missed multiple visits. Denies symptoms of untreated diabetes. Working on eating a better diet.   - Rechecking  HgbA1c with next labs.  - Will again refer to endocrinology.     3. Chronic back pain: Longstanding issue. She has established care in our pain clinic and has been receiving meds through them.   - Continue ongoing pain clinic follow up.      4. Routine HIV care:   A. Prophylaxis:   - Strongly advised her to get back on bactrim single strength daily (Toxo IgG negative)   - I have not started MAC prophylaxis since I have been hoping to get her on ARVs; will discuss at follow up   B. Routine ID screening:   - Hep B negative 2012   - Hep C negative 2012   - Quant gold negative 2013   - STIs (GC/chlam/RPR) negative 1/2018, declines repeat testing (jointly monogamous relationship)   - CMV IgG positive   - Toxo IgG negative 2013   C. Vaccination status:   - HAV IgG negative; no indication for vaccination   - HBV immune   - P23 (2012), P13 (2016), pneumovax booster 10/19.   - Tdap 2018   - Has declined Influenza vaccine.   - No indication for menactra   D. Bone Health:   - No screening indicated at this time.    E. Renal/CV:   - Rechecking Cr   - Will address BP and lipids once HIV under better control   - Diabetes as above.    - Ongoing daily marijuana use; no tobacco use. Will address at follow up.    F. Cancer screening   - Due for pap; will perform once I am able to see her in clinic.       Follow up visit next week.     Ashley Noguera MD  [phone]

## 2020-07-30 NOTE — TELEPHONE ENCOUNTER
Refill request    Medication: traMADol (ULTRAM) 50 MG tablet       MNPMP Checked: Yes    Last refilled 07/02/ for 90 tablets    Refilled due: 08/01/20    Last clinic appointment:05/29/20  Next clinic appointment: 09/17/20      Preferred pharmacy:    [facility]       Refill request    Medication: oxyCODONE-acetaminophen (PERCOCET) 5-325 MG tablet      MNPMP Checked: Yes    Last refilled 07/02/20 for 35 tablets    Refilled due: 07/07/20    Last clinic appointment: 05/29/20  Next clinic appointment: 09/17/20       Preferred pharmacy:    [facility]

## 2020-07-30 NOTE — PROGRESS NOTES
REASON FOR VISIT: Follow up HIV.     HISTORY OF PRESENT ILLNESS:  Bella Fung is a 35 year old female with HIV/AIDS who was last seen by me in clinic in October. She has missed multiple visits since this time.   Today, she tells me that she is not doing very well.  She is very worried about her . He also has HIV and she thinks his disease is progressing. He is losing a lot of weight and has bothersome urinary symptoms. He has also been drinking more heavily lately. She is scared that he is not taking his health issues seriously and would like me to schedule him for a visit ASA.     Bella reports physically feeling okay. She has good energy and has been trying to eat more healthy foods.  She is unfortunately not taking her ARVs regularly. She takes them about once a week but otherwise gives them to her . She thinks he would benefit from them more than her and also thinks they make her feel depressed. The only meds she is taking now are her pain meds. She feels that she has been too busy dealing with work and her children to take care of her own health.     She reports doing a good job with Covid-19 preventative measures.  She has not had any recent symptoms concerning for Covid. She recently thought her  had Covid (he was coughing and had chest pain) but no one else in the family got sick and he recovered quite quickly.     I have reviewed and updated the patient's Past Medical History, Social History, Family History, allergy and Medication List.    PERTINENT LAB DATA:   Obtained 10/31/19:  CMP normal  CBC remarkable for WBC 2.7  HgbA1c 7.1  HIV RNA 23,598  CD4 37    ASSESSMENT AND RECOMMENDATIONS:  36 yo female with HIV/AIDS, complicated by non-adherence, presenting for follow up.     1. HIV:  Bella has unfortunately continued to struggle with adherence. Although we have talked multiple times in the past about risk of taking ARVs intermittently, she is still doing this. She is able to  explain to my why this is dangerous (resistance) but is not sure why she still does it. I again explained that taking no therapy at all would be better than once/week and advised her to stop completely until we can get her in for labs and talk strategize a plan for adherence. I am very concerned about her risk of AIDS complications (including death) if we cannot get her on stable ARVs.    - Advised her to stop Dol/Descovy until I see her in clinic next week.   - Will check CBC, CMP, HIV RNA, CD4 and roderick/pheno.    - See below for routine HIV care.     2. Diabetes:  HgbA1c 7.1 at last visit. Referred to endocrinology but missed multiple visits. Denies symptoms of untreated diabetes. Working on eating a better diet.   - Rechecking  HgbA1c with next labs.  - Will again refer to endocrinology.     3. Chronic back pain: Longstanding issue. She has established care in our pain clinic and has been receiving meds through them.   - Continue ongoing pain clinic follow up.      4. Routine HIV care:   A. Prophylaxis:   - Strongly advised her to get back on bactrim single strength daily (Toxo IgG negative)   - I have not started MAC prophylaxis since I have been hoping to get her on ARVs; will discuss at follow up   B. Routine ID screening:   - Hep B negative 2012   - Hep C negative 2012   - Quant gold negative 2013   - STIs (GC/chlam/RPR) negative 1/2018, declines repeat testing (jointly monogamous relationship)   - CMV IgG positive   - Toxo IgG negative 2013   C. Vaccination status:   - HAV IgG negative; no indication for vaccination   - HBV immune   - P23 (2012), P13 (2016), pneumovax booster 10/19.   - Tdap 2018   - Has declined Influenza vaccine.   - No indication for menactra   D. Bone Health:   - No screening indicated at this time.    E. Renal/CV:   - Rechecking Cr   - Will address BP and lipids once HIV under better control   - Diabetes as above.    - Ongoing daily marijuana use; no tobacco use. Will address at follow  up.    F. Cancer screening   - Due for pap; will perform once I am able to see her in clinic.       Follow up visit next week.     Ashley Noguera MD  [phone]

## 2020-07-31 DIAGNOSIS — B20 AIDS (ACQUIRED IMMUNE DEFICIENCY SYNDROME) (H): ICD-10-CM

## 2020-07-31 LAB
ALBUMIN SERPL-MCNC: 3.2 G/DL (ref 3.4–5)
ALP SERPL-CCNC: 60 U/L (ref 40–150)
ALT SERPL W P-5'-P-CCNC: 17 U/L (ref 0–50)
ANION GAP SERPL CALCULATED.3IONS-SCNC: 5 MMOL/L (ref 3–14)
AST SERPL W P-5'-P-CCNC: 16 U/L (ref 0–45)
BASOPHILS # BLD AUTO: 0 10E9/L (ref 0–0.2)
BASOPHILS NFR BLD AUTO: 1 %
BILIRUB SERPL-MCNC: 0.4 MG/DL (ref 0.2–1.3)
BUN SERPL-MCNC: 15 MG/DL (ref 7–30)
CALCIUM SERPL-MCNC: 8.3 MG/DL (ref 8.5–10.1)
CD3 CELLS # BLD: 1284 CELLS/UL (ref 603–2990)
CD3 CELLS NFR BLD: 80 % (ref 49–84)
CD3+CD4+ CELLS # BLD: 26 CELLS/UL (ref 441–2156)
CD3+CD4+ CELLS NFR BLD: 2 % (ref 28–63)
CD3+CD4+ CELLS/CD3+CD8+ CLL BLD: 0.03 % (ref 1.4–2.6)
CD3+CD8+ CELLS # BLD: 1216 CELLS/UL (ref 125–1312)
CD3+CD8+ CELLS NFR BLD: 76 % (ref 10–40)
CHLORIDE SERPL-SCNC: 106 MMOL/L (ref 94–109)
CHOLEST SERPL-MCNC: 131 MG/DL
CO2 SERPL-SCNC: 25 MMOL/L (ref 20–32)
CREAT SERPL-MCNC: 0.9 MG/DL (ref 0.52–1.04)
DIFFERENTIAL METHOD BLD: ABNORMAL
EOSINOPHIL # BLD AUTO: 0.1 10E9/L (ref 0–0.7)
EOSINOPHIL NFR BLD AUTO: 2 %
ERYTHROCYTE [DISTWIDTH] IN BLOOD BY AUTOMATED COUNT: 14.6 % (ref 10–15)
GFR SERPL CREATININE-BSD FRML MDRD: 82 ML/MIN/{1.73_M2}
GLUCOSE SERPL-MCNC: 108 MG/DL (ref 70–99)
HBA1C MFR BLD: 6.3 % (ref 0–5.6)
HCT VFR BLD AUTO: 38.5 % (ref 35–47)
HDLC SERPL-MCNC: 58 MG/DL
HGB BLD-MCNC: 12.3 G/DL (ref 11.7–15.7)
IFC SPECIMEN: ABNORMAL
IMM GRANULOCYTES # BLD: 0 10E9/L (ref 0–0.4)
IMM GRANULOCYTES NFR BLD: 0 %
LDLC SERPL CALC-MCNC: 60 MG/DL
LYMPHOCYTES # BLD AUTO: 1.4 10E9/L (ref 0.8–5.3)
LYMPHOCYTES NFR BLD AUTO: 44.9 %
MCH RBC QN AUTO: 28.7 PG (ref 26.5–33)
MCHC RBC AUTO-ENTMCNC: 31.9 G/DL (ref 31.5–36.5)
MCV RBC AUTO: 90 FL (ref 78–100)
MONOCYTES # BLD AUTO: 0.3 10E9/L (ref 0–1.3)
MONOCYTES NFR BLD AUTO: 11.2 %
NEUTROPHILS # BLD AUTO: 1.2 10E9/L (ref 1.6–8.3)
NEUTROPHILS NFR BLD AUTO: 40.9 %
NONHDLC SERPL-MCNC: 73 MG/DL
NRBC # BLD AUTO: 0 10*3/UL
NRBC BLD AUTO-RTO: 0 /100
PLATELET # BLD AUTO: 202 10E9/L (ref 150–450)
POTASSIUM SERPL-SCNC: 4 MMOL/L (ref 3.4–5.3)
PROT SERPL-MCNC: 7.5 G/DL (ref 6.8–8.8)
RBC # BLD AUTO: 4.29 10E12/L (ref 3.8–5.2)
SODIUM SERPL-SCNC: 136 MMOL/L (ref 133–144)
TRIGL SERPL-MCNC: 66 MG/DL
WBC # BLD AUTO: 3 10E9/L (ref 4–11)

## 2020-07-31 PROCEDURE — 84999 UNLISTED CHEMISTRY PROCEDURE: CPT | Mod: TEL | Performed by: COLON & RECTAL SURGERY

## 2020-07-31 PROCEDURE — 87903 PHENOTYPE DNA HIV W/CULTURE: CPT | Mod: TEL | Performed by: COLON & RECTAL SURGERY

## 2020-08-01 LAB
HIV1 RNA # PLAS NAA DL=20: ABNORMAL {COPIES}/ML
HIV1 RNA SERPL NAA+PROBE-LOG#: 4.2 {LOG_COPIES}/ML

## 2020-08-03 RX ORDER — OXYCODONE AND ACETAMINOPHEN 5; 325 MG/1; MG/1
1-2 TABLET ORAL EVERY 6 HOURS PRN
Qty: 35 TABLET | Refills: 0 | Status: SHIPPED | OUTPATIENT
Start: 2020-08-03 | End: 2020-09-04

## 2020-08-03 RX ORDER — TRAMADOL HYDROCHLORIDE 50 MG/1
50 TABLET ORAL 3 TIMES DAILY
Qty: 90 TABLET | Refills: 0 | Status: SHIPPED | OUTPATIENT
Start: 2020-08-03 | End: 2020-09-04

## 2020-08-03 RX ORDER — METHOCARBAMOL 750 MG/1
750 TABLET, FILM COATED ORAL 4 TIMES DAILY PRN
Qty: 90 TABLET | Refills: 2 | Status: SHIPPED | OUTPATIENT
Start: 2020-08-03 | End: 2020-10-06

## 2020-08-05 ENCOUNTER — TELEPHONE (OUTPATIENT)
Dept: PHARMACY | Facility: CLINIC | Age: 35
End: 2020-08-05

## 2020-08-05 DIAGNOSIS — B20 HUMAN IMMUNODEFICIENCY VIRUS (HIV) DISEASE (H): ICD-10-CM

## 2020-08-05 RX ORDER — SULFAMETHOXAZOLE AND TRIMETHOPRIM 400; 80 MG/1; MG/1
TABLET ORAL
Qty: 30 TABLET | Refills: 0 | Status: SHIPPED | OUTPATIENT
Start: 2020-08-05 | End: 2020-08-07

## 2020-08-05 RX ORDER — EMTRICITABINE AND TENOFOVIR ALAFENAMIDE 200; 25 MG/1; MG/1
TABLET ORAL
Qty: 30 TABLET | Refills: 0 | Status: SHIPPED | OUTPATIENT
Start: 2020-08-05 | End: 2020-08-07

## 2020-08-05 RX ORDER — DOLUTEGRAVIR SODIUM 50 MG/1
TABLET, FILM COATED ORAL
Qty: 30 TABLET | Refills: 0 | Status: SHIPPED | OUTPATIENT
Start: 2020-08-05 | End: 2020-08-07

## 2020-08-05 NOTE — TELEPHONE ENCOUNTER
Pt called to order refills. Descovy, Bactrim and Tivicay all need refills. Sent request and left a message for Juany LEWIS to send down refills. Pt will arrive with in the hour.     Last Filled on: 07/03/20   Follow-up Date: 09/01/20    Marlin Wise CPhT  [facility]  [phone]

## 2020-08-07 ENCOUNTER — VIRTUAL VISIT (OUTPATIENT)
Dept: INFECTIOUS DISEASES | Facility: CLINIC | Age: 35
End: 2020-08-07
Attending: COLON & RECTAL SURGERY
Payer: COMMERCIAL

## 2020-08-07 DIAGNOSIS — B20 HUMAN IMMUNODEFICIENCY VIRUS (HIV) DISEASE (H): Primary | ICD-10-CM

## 2020-08-07 RX ORDER — AZITHROMYCIN 600 MG/1
1200 TABLET, FILM COATED ORAL
Qty: 10 TABLET | Refills: 4 | Status: SHIPPED | OUTPATIENT
Start: 2020-08-07 | End: 2021-06-21

## 2020-08-07 RX ORDER — SULFAMETHOXAZOLE AND TRIMETHOPRIM 400; 80 MG/1; MG/1
1 TABLET ORAL DAILY
Qty: 30 TABLET | Refills: 11 | Status: SHIPPED | OUTPATIENT
Start: 2020-08-07 | End: 2020-09-02

## 2020-08-07 ASSESSMENT — PAIN SCALES - GENERAL: PAINLEVEL: NO PAIN (0)

## 2020-08-07 NOTE — PROGRESS NOTES
REASON FOR VISIT: Follow up HIV.      HISTORY OF PRESENT ILLNESS:  Bella Fung is a 35 year old female with HIV/AIDS who was last seen by me in clinic in last week. At this time, she told me that she had only been taking her ARVs intermittently so I advised her to stop therapy completely. Since last week she reports doing okay. She is continuing to work 2 jobs so is staying very busy. She denies any new physical symptoms and reports that overall she feels better since being completely off of ARVs.     She remains very concerned about her 's health. She is also worried about her children going back to school and how she is going to balance distance learning with her work.      I have reviewed the patient's Past Medical History, Social History, Family History, allergy and Medication List.    EXAM:  NAD, pleasant  Breathing comfortably on RA  Speech fluent and appropriate     PERTINENT LAB DATA:   Obtained 7/31/20:  CMP normal  CBC remarkable for WBC 3  HgbA1c 6.3  LDL 60  HLD 58  TG 66  HIV RNA 14,641  CD4 26 (2%)     ASSESSMENT AND RECOMMENDATIONS:  36 yo female with HIV/AIDS, complicated by non-adherence, presenting for follow up.      1. HIV:  Given ongoing adherence issues, I advised her to completely stop taking ARVs (descovy/dol) last week. We reviewed most recent labs today. She was understandably concerned about very low CD4. She would like to be on therapy but is worried since she has not tolerated any of the regimens she has tried in the past.  I am also unsure of what to start her on. Given her long h/o intermittent ARV use and risk of resistance, I think it is probably in her best interest to wait until we have results of roderick/pheno to choose a new regimen. She was in agreement with this plan. Given the delay in initiating ARVs, will start MAC prophylaxis with azithromycin.   - I will contact her once results of roderick/pheno are available so that we can come up with an appropriate ARV regimen.    - In person follow up in ~3 weeks to further discuss strategies to maximize adherence when AVRs are restarted; will engage clinic SW and pharmacy.    - See below for routine HIV care.      2. Diabetes:  HgbA1c improved from 7.1 to 6.3 - she was very excited about this since she has been working hard on weight loss and eating healthier. Referred to endocrinology but missed multiple visits. Denies symptoms of untreated diabetes.   - Will discuss endocrine referral at follow up.      3. Chronic back pain: Longstanding issue. She has established care in our pain clinic and has been receiving meds through them.   - Continue ongoing pain clinic follow up.       4. Routine HIV care:              A. Prophylaxis:              - Continue bactrim single strength daily (toxo IgG negative)   - Start azithromycin 1200mg weekly; she was counseled about side effects and will contact me if she has issues tolerating it.               B. Routine ID screening:              - Hep B negative 2012              - Hep C negative 2012, denies risk factors since.              - Quant gold negative 2013              - STIs (GC/chlam/RPR) negative 1/2018, declines repeat testing (jointly monogamous relationship)              - CMV IgG positive              - Toxo IgG negative 2013              C. Vaccination status:              - HAV IgG negative; no indication for vaccination              - HBV immune              - P23 (2012), P13 (2016), pneumovax booster 10/19.              - Tdap 2018              - Has declined Influenza vaccine.              - No indication for menactra              D. Bone Health:              - No screening indicated at this time.               E. Renal/CV:              - Cr has been normal   - Lipids at goal              - Will address BP once HIV under better control              - Diabetes as above.    - She is working on weight loss              - Ongoing daily marijuana use; no tobacco use. Will address at  follow up.               F. Cancer screening              - Due for pap; will perform at in person visit in 3 weeks.        Ms. Fung knows to contact me with questions or if issues arise prior to her next scheduled visit in a few weeks.       Ashley Noguera MD  [phone]

## 2020-08-07 NOTE — PROGRESS NOTES
"Bella Fung is a 35 year old female who is being evaluated via a billable telephone visit.      The patient has been notified of following:     \"This telephone visit will be conducted via a call between you and your physician/provider. We have found that certain health care needs can be provided without the need for a physical exam.  This service lets us provide the care you need with a short phone conversation.  If a prescription is necessary we can send it directly to your pharmacy.  If lab work is needed we can place an order for that and you can then stop by our lab to have the test done at a later time.    Telephone visits are billed at different rates depending on your insurance coverage. During this emergency period, for some insurers they may be billed the same as an in-person visit.  Please reach out to your insurance provider with any questions.    If during the course of the call the physician/provider feels a telephone visit is not appropriate, you will not be charged for this service.\"    Patient has given verbal consent for Telephone visit?  Yes    What phone number would you like to be contacted at? [phone]    How would you like to obtain your AVS? Mail a copy    Phone call duration:16 minutes    Ashley Noguera MD      "

## 2020-08-07 NOTE — LETTER
"8/7/2020       RE: Bella Fung  [address]  [facility] [address]     Dear Colleague,    Thank you for referring your patient, Bella Fung, to the [facility] AND INFECTIOUS DISEASES at [facility]. Please see a copy of my visit note below.    Bella Fung is a 35 year old female who is being evaluated via a billable telephone visit.      The patient has been notified of following:     \"This telephone visit will be conducted via a call between you and your physician/provider. We have found that certain health care needs can be provided without the need for a physical exam.  This service lets us provide the care you need with a short phone conversation.  If a prescription is necessary we can send it directly to your pharmacy.  If lab work is needed we can place an order for that and you can then stop by our lab to have the test done at a later time.    Telephone visits are billed at different rates depending on your insurance coverage. During this emergency period, for some insurers they may be billed the same as an in-person visit.  Please reach out to your insurance provider with any questions.    If during the course of the call the physician/provider feels a telephone visit is not appropriate, you will not be charged for this service.\"    Patient has given verbal consent for Telephone visit?  Yes    What phone number would you like to be contacted at? [phone]    How would you like to obtain your AVS? Mail a copy    Phone call duration:16 minutes    Ashley Noguera MD        REASON FOR VISIT: Follow up HIV.      HISTORY OF PRESENT ILLNESS:  Bella Fung is a 35 year old female with HIV/AIDS who was last seen by me in clinic in last week. At this time, she told me that she had only been taking her ARVs intermittently so I advised her to stop therapy completely. Since last week she reports doing okay. She is continuing to work " 2 jobs so is staying very busy. She denies any new physical symptoms and reports that overall she feels better since being completely off of ARVs.     She remains very concerned about her 's health. She is also worried about her children going back to school and how she is going to balance distance learning with her work.      I have reviewed the patient's Past Medical History, Social History, Family History, allergy and Medication List.    EXAM:  NAD, pleasant  Breathing comfortably on RA  Speech fluent and appropriate     PERTINENT LAB DATA:   Obtained 7/31/20:  CMP normal  CBC remarkable for WBC 3  HgbA1c 6.3  LDL 60  HLD 58  TG 66  HIV RNA 14,641  CD4 26 (2%)     ASSESSMENT AND RECOMMENDATIONS:  34 yo female with HIV/AIDS, complicated by non-adherence, presenting for follow up.      1. HIV:  Given ongoing adherence issues, I advised her to completely stop taking ARVs (descovy/dol) last week. We reviewed most recent labs today. She was understandably concerned about very low CD4. She would like to be on therapy but is worried since she has not tolerated any of the regimens she has tried in the past.  I am also unsure of what to start her on. Given her long h/o intermittent ARV use and risk of resistance, I think it is probably in her best interest to wait until we have results of roderick/pheno to choose a new regimen. She was in agreement with this plan. Given the delay in initiating ARVs, will start MAC prophylaxis with azithromycin.   - I will contact her once results of roderick/pheno are available so that we can come up with an appropriate ARV regimen.   - In person follow up in ~3 weeks to further discuss strategies to maximize adherence when AVRs are restarted; will engage clinic SW and pharmacy.    - See below for routine HIV care.      2. Diabetes:  HgbA1c improved from 7.1 to 6.3 - she was very excited about this since she has been working hard on weight loss and eating healthier. Referred to  endocrinology but missed multiple visits. Denies symptoms of untreated diabetes.   - Will discuss endocrine referral at follow up.      3. Chronic back pain: Longstanding issue. She has established care in our pain clinic and has been receiving meds through them.   - Continue ongoing pain clinic follow up.       4. Routine HIV care:              A. Prophylaxis:              - Continue bactrim single strength daily (toxo IgG negative)   - Start azithromycin 1200mg weekly; she was counseled about side effects and will contact me if she has issues tolerating it.               B. Routine ID screening:              - Hep B negative 2012              - Hep C negative 2012, denies risk factors since.              - Quant gold negative 2013              - STIs (GC/chlam/RPR) negative 1/2018, declines repeat testing (jointly monogamous relationship)              - CMV IgG positive              - Toxo IgG negative 2013              C. Vaccination status:              - HAV IgG negative; no indication for vaccination              - HBV immune              - P23 (2012), P13 (2016), pneumovax booster 10/19.              - Tdap 2018              - Has declined Influenza vaccine.              - No indication for menactra              D. Bone Health:              - No screening indicated at this time.               E. Renal/CV:              - Cr has been normal   - Lipids at goal              - Will address BP once HIV under better control              - Diabetes as above.    - She is working on weight loss              - Ongoing daily marijuana use; no tobacco use. Will address at follow up.               F. Cancer screening              - Due for pap; will perform at in person visit in 3 weeks.        Ms. Fung knows to contact me with questions or if issues arise prior to her next scheduled visit in a few weeks.       Ashley Noguera MD  [phone]

## 2020-08-31 LAB
LOCATION PERFORMED: NORMAL
RESULT: NORMAL
SEND OUTS MISC TEST CODE: NORMAL
SEND OUTS MISC TEST SPECIMEN: NORMAL
TEST NAME: NORMAL

## 2020-09-02 DIAGNOSIS — B20 HUMAN IMMUNODEFICIENCY VIRUS (HIV) DISEASE (H): ICD-10-CM

## 2020-09-02 RX ORDER — SULFAMETHOXAZOLE AND TRIMETHOPRIM 400; 80 MG/1; MG/1
1 TABLET ORAL DAILY
Qty: 90 TABLET | Refills: 1 | Status: SHIPPED | OUTPATIENT
Start: 2020-09-02 | End: 2021-05-28

## 2020-09-04 ENCOUNTER — OFFICE VISIT (OUTPATIENT)
Dept: INFECTIOUS DISEASES | Facility: CLINIC | Age: 35
End: 2020-09-04
Attending: COLON & RECTAL SURGERY
Payer: COMMERCIAL

## 2020-09-04 ENCOUNTER — TELEPHONE (OUTPATIENT)
Dept: INFECTIOUS DISEASES | Facility: CLINIC | Age: 35
End: 2020-09-04

## 2020-09-04 VITALS
HEIGHT: 64 IN | OXYGEN SATURATION: 100 % | DIASTOLIC BLOOD PRESSURE: 74 MMHG | BODY MASS INDEX: 45.21 KG/M2 | WEIGHT: 264.8 LBS | HEART RATE: 56 BPM | SYSTOLIC BLOOD PRESSURE: 140 MMHG | TEMPERATURE: 98 F

## 2020-09-04 DIAGNOSIS — G89.29 CHRONIC MYOFASCIAL PAIN: ICD-10-CM

## 2020-09-04 DIAGNOSIS — G89.4 CHRONIC PAIN SYNDROME: ICD-10-CM

## 2020-09-04 DIAGNOSIS — B20 HUMAN IMMUNODEFICIENCY VIRUS (HIV) DISEASE (H): ICD-10-CM

## 2020-09-04 DIAGNOSIS — M79.18 CHRONIC MYOFASCIAL PAIN: ICD-10-CM

## 2020-09-04 RX ORDER — TRAMADOL HYDROCHLORIDE 50 MG/1
50 TABLET ORAL 3 TIMES DAILY
Qty: 90 TABLET | Refills: 0 | Status: SHIPPED | OUTPATIENT
Start: 2020-09-04 | End: 2020-10-06

## 2020-09-04 RX ORDER — DARUNAVIR, COBICISTAT, EMTRICITABINE, AND TENOFOVIR ALAFENAMIDE 800; 150; 200; 10 MG/1; MG/1; MG/1; MG/1
1 TABLET, FILM COATED ORAL DAILY
Qty: 30 TABLET | Refills: 11 | Status: SHIPPED | OUTPATIENT
Start: 2020-09-04 | End: 2020-12-09

## 2020-09-04 RX ORDER — OXYCODONE AND ACETAMINOPHEN 5; 325 MG/1; MG/1
1-2 TABLET ORAL EVERY 6 HOURS PRN
Qty: 35 TABLET | Refills: 0 | Status: SHIPPED | OUTPATIENT
Start: 2020-09-04 | End: 2020-10-06

## 2020-09-04 ASSESSMENT — PAIN SCALES - GENERAL: PAINLEVEL: NO PAIN (0)

## 2020-09-04 ASSESSMENT — MIFFLIN-ST. JEOR: SCORE: 1871.12

## 2020-09-04 NOTE — PROGRESS NOTES
REASON FOR VISIT: Follow up HIV.      HISTORY OF PRESENT ILLNESS:  Bella Fung is a 35 year old female with HIV/AIDS who had a virtual visit with me on 8/7. At this time, I had her stay off of her ARVs given challenges taking them daily and concern for resistance (she was initially told to hold them on 7/30 after VL returned high).  At last visit, she committed to taking bactrim daily and azithromycin once weekly. She returns today to check in and potentially restart ARVs.     Since her last visit she reports overall doing well. She has been taking bactrim every day (no missed doses). She missed her dose of azithromycin yesterday but is planning on taking it today. She is physically feeling well - no fevers, chills, or any other symptoms. She has good energy and is working hard on losing weight (she has lost over 10 lbs since the winter). She is concerned that her mood will worsen when she gets back on ARVs; this has been a very bothersome side effect for her in the past.     Of note, she continues to work 2 jobs and is feeling stressed about how she will keep this up while her children are home schooling.     PAST MEDICAL HISTORY:  Past Medical History:   Diagnosis Date     Chronic pain      Diabetes (H) 2019     Never on therapy.      HIV (human immunodeficiency virus infection) (H)     Diagnosed 2011. Initially [place] at [facility] [place]. Opportunistic Infections: None. HLA  Status: Negative 2012. Historical use of ARVs: Truvada, Reyataz, and Norvir from 2012 until 8/2013; switched atazanavir to darunavir 8/2013; emtricitabine-tenofovir and darunavir-cobicistat 10/2014- around 2016, Descovy and Prezcobix in 2017, then ~ 11/2017 to Descovy and Dolutegravir. Adherence very poor since     ALLERGIES: NKDA    FAMILY/SOCIAL HISTORY: Reviewed. Changes as above. Her  accompanied her today.     ROS: 12 point ROS obtained, pertinent positives and negatives as above.     EXAM:  BP (!) 140/74   Pulse 56   Temp 98  " F (36.7  C) (Oral)   Ht 1.626 m (5' 4\")   Wt 120.1 kg (264 lb 12.8 oz)   SpO2 100%   BMI 45.45 kg/m    GEN: NAD.  HEENT: PERRL, EOMI, no icterus or injection. OP moist and clear, without lesions or exudate.   NECK: Supple.    LUNGS: Clear bilaterally. Breathing comfortably on RA.  ABDOMEN: soft, nontender, nondistended.   SKIN: Exposed skin without rashes/lesions.  EXT:  Warm and without edema.   NEURO: CN grossly intact, gait normal.      PERTINENT LAB DATA:   Obtained 7/31/20:  CMP normal  CBC remarkable for WBC 3  HgbA1c 6.3  LDL 60  HLD 58  TG 66  HIV RNA 14,641  CD4 26 (2%)     ASSESSMENT AND RECOMMENDATIONS:  36 yo female with HIV/AIDS, complicated by non-adherence, presenting for follow up.     1. HIV:  Given ongoing adherence issues, I advised her to completely stop taking ARVs (descovy/dol) about a month ago while awaiting roderick/pheno results. Unfotunately, the assay was unable to be run given low blood volume.  I do not feel comfortable keeping her off of therapy any longer since she seems to be very motivated to get back on treatment and has such a low CD4 count.  Since she is at relatively high risk for having resistance, I would like to err on the side of caution and keep her on a regimen with both a PI and Integrase Inhibitor until resistance testing returns.   - Reordered roderick/pheno.  - Will start Symtuza (sue-boosted darunavir + descovy) PLUS dolutegravir.   - I showed her examples of both pills and reviewed dosing instructions and possible side effects.  - She will contact me immediately if she develops any issues with the regimen.   - I have engaged our clinic pharmacy and SW to support Bella's adherence.   - Follow up phone visit in 2 weeks.    - See below for routine HIV care.      2. Diabetes:  HgbA1c improved from 7.1 to 6.3 - she was very excited about this since she has been working hard on weight loss and eating healthier. Referred to endocrinology but missed multiple visits. Denies " symptoms of untreated diabetes.   - Will again discuss endocrine referral at follow up.      3. Chronic back pain: Longstanding issue. She has established care in our pain clinic and has been receiving meds through them. She was hoping to  scripts today but was told that no provider was available.   - I will provide her with a month supply of usual meds (confirmed that no other providers are giving her pain meds)   - Continue ongoing pain clinic follow up.       4. Routine HIV care:              A. Prophylaxis:              - Continue bactrim single strength daily (toxo IgG negative)   - Continue azithromycin 1200mg weekly            B. Routine ID screening:              - Hep B negative 2012              - Hep C negative 2012, denies risk factors since.              - Quant gold negative 2013              - STIs (GC/chlam/RPR) negative 1/2018, declines repeat testing (jointly monogamous relationship)              - CMV IgG positive              - Toxo IgG negative 2013              C. Vaccination status:              - HAV IgG negative; no indication for vaccination              - HBV immune              - P23 (2012), P13 (2016), pneumovax booster 10/2019.              - Tdap 2018              - Has declined Influenza vaccine.              - No indication for menactra              D. Bone Health:              - No screening indicated at this time.               E. Renal/CV:              - Cr has been normal   - Lipids at goal              - Will address BP once HIV under better control              - Diabetes as above.    - She is working on weight loss              - Ongoing daily marijuana use; no tobacco use. Will address at follow up.               F. Cancer screening              -Overdue for pap; will work on getting her in for this.        Ms. Fung knows to contact me with questions or if issues arise prior to her next scheduled visit in 2 weeks.       Ashley Noguera MD  [phone]

## 2020-09-04 NOTE — LETTER
9/4/2020       RE: Bella Fung  [address]  [facility] [address]     Dear Colleague,    Thank you for referring your patient, Bella Fung, to the [facility] AND INFECTIOUS DISEASES at [facility]. Please see a copy of my visit note below.    REASON FOR VISIT: Follow up HIV.      HISTORY OF PRESENT ILLNESS:  Bella Fung is a 35 year old female with HIV/AIDS who had a virtual visit with me on 8/7. At this time, I had her stay off of her ARVs given challenges taking them daily and concern for resistance (she was initially told to hold them on 7/30 after VL returned high).  At last visit, she committed to taking bactrim daily and azithromycin once weekly. She returns today to check in and potentially restart ARVs.     Since her last visit she reports overall doing well. She has been taking bactrim every day (no missed doses). She missed her dose of azithromycin yesterday but is planning on taking it today. She is physically feeling well - no fevers, chills, or any other symptoms. She has good energy and is working hard on losing weight (she has lost over 10 lbs since the winter). She is concerned that her mood will worsen when she gets back on ARVs; this has been a very bothersome side effect for her in the past.     Of note, she continues to work 2 jobs and is feeling stressed about how she will keep this up while her children are home schooling.     PAST MEDICAL HISTORY:  Past Medical History:   Diagnosis Date     Chronic pain      Diabetes (H) 2019     Never on therapy.      HIV (human immunodeficiency virus infection) (H)     [place] 2011. [place] [facility] [place] Opportunistic Infections: None. HLA  Status: Negative 2012. Historical use of ARVs: Truvada, Reyataz, and Norvir from 2012 until 8/2013; switched atazanavir to darunavir 8/2013; emtricitabine-tenofovir and darunavir-cobicistat 10/2014- around 2016, Descovy and Prezcobix  "in 2017, then ~ 11/2017 to Descovy and Dolutegravir. Adherence very poor since     ALLERGIES: NKDA    FAMILY/SOCIAL HISTORY: Reviewed. Changes as above. Her  accompanied her today.     ROS: 12 point ROS obtained, pertinent positives and negatives as above.     EXAM:  BP (!) 140/74   Pulse 56   Temp 98  F (36.7  C) (Oral)   Ht 1.626 m (5' 4\")   Wt 120.1 kg (264 lb 12.8 oz)   SpO2 100%   BMI 45.45 kg/m    GEN: NAD.  HEENT: PERRL, EOMI, no icterus or injection. OP moist and clear, without lesions or exudate.   NECK: Supple.    LUNGS: Clear bilaterally. Breathing comfortably on RA.  ABDOMEN: soft, nontender, nondistended.   SKIN: Exposed skin without rashes/lesions.  EXT:  Warm and without edema.   NEURO: CN grossly intact, gait normal.      PERTINENT LAB DATA:   Obtained 7/31/20:  CMP normal  CBC remarkable for WBC 3  HgbA1c 6.3  LDL 60  HLD 58  TG 66  HIV RNA 14,641  CD4 26 (2%)     ASSESSMENT AND RECOMMENDATIONS:  34 yo female with HIV/AIDS, complicated by non-adherence, presenting for follow up.     1. HIV:  Given ongoing adherence issues, I advised her to completely stop taking ARVs (descovy/dol) about a month ago while awaiting roderick/pheno results. Unfotunately, the assay was unable to be run given low blood volume.  I do not feel comfortable keeping her off of therapy any longer since she seems to be very motivated to get back on treatment and has such a low CD4 count.  Since she is at relatively high risk for having resistance, I would like to err on the side of caution and keep her on a regimen with both a PI and Integrase Inhibitor until resistance testing returns.   - Reordered roderick/pheno.  - Will start Symtuza (sue-boosted darunavir + descovy) PLUS dolutegravir.   - I showed her examples of both pills and reviewed dosing instructions and possible side effects.  - She will contact me immediately if she develops any issues with the regimen.   - I have engaged our clinic pharmacy and SW to support " Bella's adherence.   - Follow up phone visit in 2 weeks.    - See below for routine HIV care.      2. Diabetes:  HgbA1c improved from 7.1 to 6.3 - she was very excited about this since she has been working hard on weight loss and eating healthier. Referred to endocrinology but missed multiple visits. Denies symptoms of untreated diabetes.   - Will again discuss endocrine referral at follow up.      3. Chronic back pain: Longstanding issue. She has established care in our pain clinic and has been receiving meds through them. She was hoping to  scripts today but was told that no provider was available.   - I will provide her with a month supply of usual meds (confirmed that no other providers are giving her pain meds)   - Continue ongoing pain clinic follow up.       4. Routine HIV care:              A. Prophylaxis:              - Continue bactrim single strength daily (toxo IgG negative)   - Continue azithromycin 1200mg weekly            B. Routine ID screening:              - Hep B negative 2012              - Hep C negative 2012, denies risk factors since.              - Quant gold negative 2013              - STIs (GC/chlam/RPR) negative 1/2018, declines repeat testing (jointly monogamous relationship)              - CMV IgG positive              - Toxo IgG negative 2013              C. Vaccination status:              - HAV IgG negative; no indication for vaccination              - HBV immune              - P23 (2012), P13 (2016), pneumovax booster 10/2019.              - Tdap 2018              - Has declined Influenza vaccine.              - No indication for menactra              D. Bone Health:              - No screening indicated at this time.               E. Renal/CV:              - Cr has been normal   - Lipids at goal              - Will address BP once HIV under better control              - Diabetes as above.    - She is working on weight loss              - Ongoing daily marijuana use; no  tobacco use. Will address at follow up.               F. Cancer screening              -Overdue for pap; will work on getting her in for this.        Ms. Fung knows to contact me with questions or if issues arise prior to her next scheduled visit in 2 weeks.       Ashley Noguera MD  [phone]

## 2020-09-04 NOTE — PATIENT INSTRUCTIONS
It was great to see you today!  Here is what we talked about:    1)  Come in to take labs drawn in the next few days.  2)  You can start taking Symtuza (that is the new orange pill).  You will take this once per day. This should be combined with Tivicay (also known as dolutegravir).    3) STOP taking the descovy.   4) Keep taking bactrim and azithromycin.    I will set up a phone visit in 2 weeks to check in.     Please let me know if anything comes up before our next visit!    Ashley

## 2020-09-04 NOTE — TELEPHONE ENCOUNTER
Prior Authorization Retail Medication Request    Medication/Dose: Symtuza [phone] mg  ICD code (if different than what is on RX):  B20  Previously Tried and Failed:   Rationale:  Pt has adherence issues. Combining Prezcobix and Descovy in one tablet takes the pill burden down.     Insurance Name:  [employer]   Insurance ID:  [id number]      Pharmacy Information (if different than what is on RX)  Name:  [facility]   Phone:  [phone]    [facility] Key Key: [id number]      Thank you!!    Marlin Wise CPhT  [facility]  [phone]

## 2020-09-08 RX ORDER — DOLUTEGRAVIR SODIUM 50 MG/1
TABLET, FILM COATED ORAL
Qty: 30 TABLET | Refills: 0 | OUTPATIENT
Start: 2020-09-08

## 2020-09-08 RX ORDER — EMTRICITABINE AND TENOFOVIR ALAFENAMIDE 200; 25 MG/1; MG/1
TABLET ORAL
Qty: 30 TABLET | Refills: 0 | OUTPATIENT
Start: 2020-09-08

## 2020-09-08 RX ORDER — SULFAMETHOXAZOLE AND TRIMETHOPRIM 400; 80 MG/1; MG/1
TABLET ORAL
Qty: 30 TABLET | Refills: 0 | OUTPATIENT
Start: 2020-09-08

## 2020-09-08 NOTE — TELEPHONE ENCOUNTER
PA Initiation    Medication: Symtuza [phone] mg  Insurance Company: [employer] - Phone [phone] Fax [phone]  Pharmacy Filling the Rx: [facility] [place], [place] - [address] [place] [address] [phone]  Filling Pharmacy Phone:    Filling Pharmacy Fax:    Start Date: 9/8/2020

## 2020-09-09 ENCOUNTER — TELEPHONE (OUTPATIENT)
Dept: PHARMACY | Facility: CLINIC | Age: 35
End: 2020-09-09

## 2020-09-09 NOTE — TELEPHONE ENCOUNTER
Pt came into the pharmacy.  We have to get a Prior Auth for the Symtuza  Patient will  5 prescriptions on 09/04/20      Last Filled on:  08/05/20  Follow-up Date: 10/01/20    Marlin Wise CPhT  [facility]  [phone]

## 2020-09-09 NOTE — TELEPHONE ENCOUNTER
There is some confusion here. She will be taking the Symtuza and Tivicay together and discontinuing the Descovy. She would need to fill the Symtuza before 9/30/20.     Thanks!    Marlin Wise, [facility]  [phone]

## 2020-09-09 NOTE — TELEPHONE ENCOUNTER
Diego from [facility] called PA team.  He stated Symtuza is approved.  The approval will start on 9/30/2020 due to a fill of dolutegravir (TIVICAY) 50 MG tablet.  Diego also stated in order for the approval, dolutegravir (TIVICAY) 50 MG tablet should be discontinued.  The approval dates are 9/30/2020 - 09/07/2021.  Any questions please reach out to [employer] [phone].

## 2020-09-09 NOTE — TELEPHONE ENCOUNTER
The PA was for Symtuza.  My previous note states insurance rep from [facility] called to say it has been approved.  Starting date 9/30/2020 - 9/7/2021.  Insurance rep stated the start date for the approval of Symtuza is 9/30/2020 due to a fill of dolutegravir (TIVICAY) 50 MG tablet on /9/4/2020.  After reviewing the PA that was submitted, insurance rep stated he was under the impression all other medications would be discontinued.

## 2020-09-09 NOTE — TELEPHONE ENCOUNTER
Insurance rep was under the impression all other medications would be discontinued.  I advised him of Marlin's note - patient will be taking Symtuza and Tivicay together. Insurance rep is re - reviewing the PA request.  He will call PA team line with determination.

## 2020-09-14 NOTE — TELEPHONE ENCOUNTER
Faxed PA for Symtuza/Tivicary into [facility] along with office visit note/clinical rationale.

## 2020-09-17 ENCOUNTER — VIRTUAL VISIT (OUTPATIENT)
Dept: ANESTHESIOLOGY | Facility: CLINIC | Age: 35
End: 2020-09-17
Payer: COMMERCIAL

## 2020-09-17 DIAGNOSIS — G89.4 CHRONIC PAIN SYNDROME: Primary | ICD-10-CM

## 2020-09-17 ASSESSMENT — PAIN SCALES - GENERAL: PAINLEVEL: SEVERE PAIN (6)

## 2020-09-17 NOTE — LETTER
"9/17/2020       RE: Bella Fung  [address]  [facility] [address]     Dear Colleague,    Thank you for referring your patient, Bella Fung, to the [facility] FOR COMPREHENSIVE PAIN MANAGEMENT at [facility]. Please see a copy of my visit note below.    Bella Fung is a 35 year old female who is being evaluated via a billable video visit.      The patient has been notified of following:     \"This video visit will be conducted via a call between you and your physician/provider. We have found that certain health care needs can be provided without the need for an in-person physical exam.  This service lets us provide the care you need with a video conversation.  If a prescription is necessary we can send it directly to your pharmacy.  If lab work is needed we can place an order for that and you can then stop by our lab to have the test done at a later time.    Video visits are billed at different rates depending on your insurance coverage.  Please reach out to your insurance provider with any questions.    If during the course of the call the physician/provider feels a video visit is not appropriate, you will not be charged for this service.\"    Patient has given verbal consent for Video visit? Yes  How would you like to obtain your AVS? MyChart  If you are dropped from the video visit, the video invite should be resent to: Text to cell phone: [phone]  Will anyone else be joining your video visit? No        Wen Bray CMA          Despite being roomed, the patient was unable to be contacted via video either via multiple attempts through [employer] or through [employer].  Additionally, follow up calls by clinic staff were unable to reach the patient.  We will continue to attempt to contact her to reschedule her appointment.    Toño Quiñones MD    Department of Anesthesiology  Pain Management Division    Provider was unable to " connect with the pt.   Ashlyn Rodriguez LPN      Again, thank you for allowing me to participate in the care of your patient.      Sincerely,    Toño Rosenbaum MD

## 2020-09-17 NOTE — LETTER
"9/17/2020       RE: Bella Fung  [address]  [facility] [address]     Dear Colleague,    Thank you for referring your patient, Bella Fung, to the [facility] FOR COMPREHENSIVE PAIN MANAGEMENT at [facility]. Please see a copy of my visit note below.    Bella Fung is a 35 year old female who is being evaluated via a billable video visit.      The patient has been notified of following:     \"This video visit will be conducted via a call between you and your physician/provider. We have found that certain health care needs can be provided without the need for an in-person physical exam.  This service lets us provide the care you need with a video conversation.  If a prescription is necessary we can send it directly to your pharmacy.  If lab work is needed we can place an order for that and you can then stop by our lab to have the test done at a later time.    Video visits are billed at different rates depending on your insurance coverage.  Please reach out to your insurance provider with any questions.    If during the course of the call the physician/provider feels a video visit is not appropriate, you will not be charged for this service.\"    Patient has given verbal consent for Video visit? Yes  How would you like to obtain your AVS? MyChart  If you are dropped from the video visit, the video invite should be resent to: Text to cell phone: [phone]  Will anyone else be joining your video visit? Veronica Bray CMA          Despite being roomed, the patient was unable to be contacted via video either via multiple attempts through [employer] or through [employer].  Additionally, follow up calls by clinic staff were unable to reach the patient.  We will continue to attempt to contact her to reschedule her appointment.    Provider was unable to connect with the pt.   Ashlyn Rodriguez LPN    Again, thank you for allowing me to participate in " the care of your patient.  Sincerely,    Toño Quiñones MD    Department of Anesthesiology  Pain Management Division

## 2020-09-17 NOTE — PROGRESS NOTES
"Bella Fung is a 35 year old female who is being evaluated via a billable video visit.      The patient has been notified of following:     \"This video visit will be conducted via a call between you and your physician/provider. We have found that certain health care needs can be provided without the need for an in-person physical exam.  This service lets us provide the care you need with a video conversation.  If a prescription is necessary we can send it directly to your pharmacy.  If lab work is needed we can place an order for that and you can then stop by our lab to have the test done at a later time.    Video visits are billed at different rates depending on your insurance coverage.  Please reach out to your insurance provider with any questions.    If during the course of the call the physician/provider feels a video visit is not appropriate, you will not be charged for this service.\"    Patient has given verbal consent for Video visit? Yes  How would you like to obtain your AVS? MyChart  If you are dropped from the video visit, the video invite should be resent to: Text to cell phone: [phone]  Will anyone else be joining your video visit? No        Wen Bray CMA        "

## 2020-09-17 NOTE — PROGRESS NOTES
Despite being roomed, the patient was unable to be contacted via video either via multiple attempts through [employer] or through [employer].  Additionally, follow up calls by clinic staff were unable to reach the patient.  We will continue to attempt to contact her to reschedule her appointment.    Toño Quiñones MD    Department of Anesthesiology  Pain Management Division

## 2020-10-05 ENCOUNTER — TELEPHONE (OUTPATIENT)
Dept: PHARMACY | Facility: CLINIC | Age: 35
End: 2020-10-05

## 2020-10-05 NOTE — TELEPHONE ENCOUNTER
Called patient for refill reminder.    Patient will   6 prescriptions the week of October 5.     Plust 2 meds from her pain management doctor.     Last Filled on: 09/04/20   Follow-up Date: 11/02/20    Marlin Wise CPhT  [facility]  [phone]

## 2020-10-06 ENCOUNTER — MYC REFILL (OUTPATIENT)
Dept: INFECTIOUS DISEASES | Facility: CLINIC | Age: 35
End: 2020-10-06

## 2020-10-06 DIAGNOSIS — B20 HUMAN IMMUNODEFICIENCY VIRUS (HIV) DISEASE (H): ICD-10-CM

## 2020-10-06 DIAGNOSIS — G89.4 CHRONIC PAIN SYNDROME: ICD-10-CM

## 2020-10-08 ENCOUNTER — TELEPHONE (OUTPATIENT)
Dept: ANESTHESIOLOGY | Facility: CLINIC | Age: 35
End: 2020-10-08

## 2020-10-08 NOTE — TELEPHONE ENCOUNTER
[facility] Call Center    Phone Message    May a detailed message be left on voicemail: yes     Reason for Call: Medication Question or concern regarding medication   Prescription Clarification  Name of Medication:   * oxyCODONE-acetaminophen (PERCOCET) 5-325 MG tablet  * traMADol (ULTRAM) 50 MG tablet  Prescribing Provider: Robi   Pharmacy: [facility] [place], [place] - [address] [place] [address] [phone]   What on the order needs clarification? Per Patient is needing refills on both medications. Patient states had sent a [employer] message for the request but these two medications were not refilled. Please advise.     Patient did state that Dr. Noguera had prescribed the medications at first due to was not able to see a provider sooner from the pain clinic and stated Dr. Noguera prescribed for the meantime until Patient was able to be seen.      Action Taken: Message routed to:  [facility] ([facility] pain    Travel Screening: Not Applicable

## 2020-10-09 ENCOUNTER — TELEPHONE (OUTPATIENT)
Dept: ANESTHESIOLOGY | Facility: CLINIC | Age: 35
End: 2020-10-09

## 2020-10-09 RX ORDER — OXYCODONE AND ACETAMINOPHEN 5; 325 MG/1; MG/1
1-2 TABLET ORAL EVERY 6 HOURS PRN
Qty: 35 TABLET | Refills: 0 | Status: SHIPPED | OUTPATIENT
Start: 2020-10-09 | End: 2020-11-09

## 2020-10-09 RX ORDER — TRAMADOL HYDROCHLORIDE 50 MG/1
50 TABLET ORAL 3 TIMES DAILY
Qty: 90 TABLET | Refills: 0 | Status: SHIPPED | OUTPATIENT
Start: 2020-10-09 | End: 2020-11-09

## 2020-10-09 NOTE — TELEPHONE ENCOUNTER
Refill request    Medication: oxyCODONE-acetaminophen (PERCOCET) 5-325 MG       MNPMP Checked: Yes    Last refilled 09/04/20 for 35 tablets      Last clinic appointment: 05/29/20  Next clinic appointment: 11/19/20      Preferred pharmacy:    [address]      Refill request    Medication: traMADol (ULTRAM) 50 MG tablet      MNPMP Checked: Yes    Last refilled 09/04/20 for 90 tablets        Last clinic appointment: 05/29/20  Next clinic appointment: 11/19/20      Preferred pharmacy:    [address]

## 2020-10-29 ENCOUNTER — TELEPHONE (OUTPATIENT)
Dept: INFECTIOUS DISEASES | Facility: CLINIC | Age: 35
End: 2020-10-29

## 2020-11-02 ENCOUNTER — APPOINTMENT (OUTPATIENT)
Dept: OPTOMETRY | Facility: CLINIC | Age: 35
End: 2020-11-02
Payer: COMMERCIAL

## 2020-11-02 ENCOUNTER — OFFICE VISIT (OUTPATIENT)
Dept: OPTOMETRY | Facility: CLINIC | Age: 35
End: 2020-11-02
Payer: COMMERCIAL

## 2020-11-02 DIAGNOSIS — H52.13 MYOPIA OF BOTH EYES WITH ASTIGMATISM: Primary | ICD-10-CM

## 2020-11-02 DIAGNOSIS — H52.203 MYOPIA OF BOTH EYES WITH ASTIGMATISM: Primary | ICD-10-CM

## 2020-11-02 PROCEDURE — V2103 SPHEROCYLINDR 4.00D/12-2.00D: HCPCS | Mod: LT | Performed by: OPTOMETRIST

## 2020-11-02 PROCEDURE — V2784 LENS POLYCARB OR EQUAL: HCPCS | Performed by: OPTOMETRIST

## 2020-11-02 PROCEDURE — 92004 COMPRE OPH EXAM NEW PT 1/>: CPT | Performed by: OPTOMETRIST

## 2020-11-02 PROCEDURE — V2020 VISION SVCS FRAMES PURCHASES: HCPCS | Performed by: OPTOMETRIST

## 2020-11-02 ASSESSMENT — TONOMETRY
OD_IOP_MMHG: 10
IOP_METHOD: APPLANATION
OS_IOP_MMHG: 10

## 2020-11-02 ASSESSMENT — REFRACTION_MANIFEST
OS_CYLINDER: +0.25
OD_CYLINDER: +0.25
OD_CYLINDER: +1.00
OS_AXIS: 100
OS_CYLINDER: +0.50
METHOD_AUTOREFRACTION: 1
OD_AXIS: 172
OD_SPHERE: -2.50
OS_AXIS: 085
OD_AXIS: 020
OS_SPHERE: -2.00
OS_SPHERE: -2.00
OD_SPHERE: -2.00

## 2020-11-02 ASSESSMENT — CUP TO DISC RATIO
OD_RATIO: 0.4
OS_RATIO: 0.4

## 2020-11-02 ASSESSMENT — VISUAL ACUITY
OD_CC+: -1
OD_CC: 20/20
OD_SC: 20/20
OS_SC: 20/50
OS_SC: 20/20
CORRECTION_TYPE: GLASSES
OD_SC: 20/50
METHOD: SNELLEN - LINEAR
OS_SC+: +2
OS_CC: 20/20

## 2020-11-02 ASSESSMENT — CONF VISUAL FIELD
METHOD: COUNTING FINGERS
OD_NORMAL: 1
OS_NORMAL: 1

## 2020-11-02 ASSESSMENT — REFRACTION_WEARINGRX
OD_SPHERE: -2.00
OS_AXIS: 086
SPECS_TYPE: SVL
OS_SPHERE: -1.75
OS_CYLINDER: +0.50
OD_CYLINDER: +1.00
OD_AXIS: 178

## 2020-11-02 ASSESSMENT — EXTERNAL EXAM - LEFT EYE: OS_EXAM: NORMAL

## 2020-11-02 ASSESSMENT — SLIT LAMP EXAM - LIDS
COMMENTS: NORMAL
COMMENTS: NORMAL

## 2020-11-02 ASSESSMENT — EXTERNAL EXAM - RIGHT EYE: OD_EXAM: NORMAL

## 2020-11-02 NOTE — LETTER
11/2/2020         RE: Bella Fung  [address]  [facility] [address]        Dear Colleague,    Thank you for referring your patient, [facility]ung[facility]. Please see a copy of my visit note below.    Chief Complaint   Patient presents with     Diabetic Eye Exam     Broken glasses/ temple is snapped off   Only wear glasses at night to drive.  Getting HA's when she wears her glasses for too long. Pt states she is not diabetic, although it is in her chart.    Lab Results   Component Value Date    A1C 6.3 07/31/2020    A1C 7.1 10/31/2019    A1C 6.4 06/27/2019       Last Eye Exam: 2018  Dilated Previously: Yes    What are you currently using to see?  glasses    Distance Vision Acuity: Satisfied with vision    Near Vision Acuity: Satisfied with vision while reading and using computer unaided    Eye Comfort: good  Do you use eye drops? : No  Occupation or Hobbies:  at a hotel    Martha Garcia CPO     Medical, surgical and family histories reviewed and updated 11/2/2020.       OBJECTIVE: See Ophthalmology exam    ASSESSMENT:    ICD-10-CM    1. Myopia of both eyes with astigmatism  H52.13     H52.203      PLAN:  Update prescription   Bella Fung aware  eye exam results will be sent to [facility].    Lindy Porter OD       Again, thank you for allowing me to participate in the care of your patient.        Sincerely,        Lindy Porter, OD

## 2020-11-02 NOTE — PROGRESS NOTES
Chief Complaint   Patient presents with     Diabetic Eye Exam     Broken glasses/ temple is snapped off   Only wear glasses at night to drive.  Getting HA's when she wears her glasses for too long. Pt states she is not diabetic, although it is in her chart.    Lab Results   Component Value Date    A1C 6.3 07/31/2020    A1C 7.1 10/31/2019    A1C 6.4 06/27/2019       Last Eye Exam: 2018  Dilated Previously: Yes    What are you currently using to see?  glasses    Distance Vision Acuity: Satisfied with vision    Near Vision Acuity: Satisfied with vision while reading and using computer unaided    Eye Comfort: good  Do you use eye drops? : No  Occupation or Hobbies:  at a hotel    Martha Garcia CPO     Medical, surgical and family histories reviewed and updated 11/2/2020.       OBJECTIVE: See Ophthalmology exam    ASSESSMENT:    ICD-10-CM    1. Myopia of both eyes with astigmatism  H52.13     H52.203      PLAN:  Update prescription   Bella Fung aware  eye exam results will be sent to [facility].    Lindy Porter OD

## 2020-11-05 ENCOUNTER — TELEPHONE (OUTPATIENT)
Dept: PHARMACY | Facility: CLINIC | Age: 35
End: 2020-11-05

## 2020-11-05 NOTE — TELEPHONE ENCOUNTER
Attempted to contact the patient to for refill reminder call,  left message on voicemail    Last filled on: 10/09/20    Follow-up Date: 11/10/20 2nd attempt    Marlin Wise CPhT  [facility]  [phone]

## 2020-11-09 ENCOUNTER — TELEPHONE (OUTPATIENT)
Dept: ANESTHESIOLOGY | Facility: CLINIC | Age: 35
End: 2020-11-09

## 2020-11-09 DIAGNOSIS — B20 HUMAN IMMUNODEFICIENCY VIRUS (HIV) DISEASE (H): ICD-10-CM

## 2020-11-09 DIAGNOSIS — G89.4 CHRONIC PAIN SYNDROME: ICD-10-CM

## 2020-11-09 RX ORDER — TRAMADOL HYDROCHLORIDE 50 MG/1
50 TABLET ORAL 3 TIMES DAILY
Qty: 90 TABLET | Refills: 0 | Status: SHIPPED | OUTPATIENT
Start: 2020-11-09 | End: 2021-01-11

## 2020-11-09 RX ORDER — OXYCODONE AND ACETAMINOPHEN 5; 325 MG/1; MG/1
1-2 TABLET ORAL EVERY 6 HOURS PRN
Qty: 35 TABLET | Refills: 0 | Status: SHIPPED | OUTPATIENT
Start: 2020-11-09 | End: 2020-12-23

## 2020-11-09 NOTE — TELEPHONE ENCOUNTER
Refill request routed to Dr. Rosenbaum to review.    Wen Bray, [facility]

## 2020-11-09 NOTE — TELEPHONE ENCOUNTER
Refill request    Medication: traMADol (ULTRAM) 50 MG tablet      MNPMP Checked: Yes    Last refilled 10/09/20 for 90 tablets      Last clinic appointment: 09/17/20  Next clinic appointment: 11/19/20      Preferred pharmacy:    [phone]      Refill request    Medication: oxyCODONE-acetaminophen (PERCOCET) 5-325 MG tablet      MNPMP Checked: Yes    Last refilled 10/09/20 for 35 tablets      Last clinic appointment: 09/17/20  Next clinic appointment: 11/19/20      Preferred pharmacy:    [phone]

## 2020-11-09 NOTE — TELEPHONE ENCOUNTER
[facility] Call Center    Phone Message    May a detailed message be left on voicemail: yes     Reason for Call: Medication Refill Request    Has the patient contacted the pharmacy for the refill? Yes   Name of medication being requested: oxyCODONE-acetaminophen (PERCOCET) 5-325 MG tablet and tramdol 50mg  Provider who prescribed the medication: Dr Rosenbaum  Pharmacy: [place], [place] - [address] [place] [address] [phone]    Date medication is needed: this week  11/12 or the 11/13  She only has 3 days left   Writer told pt of the 5-7 day turnaround time for medications       Action Taken: Message routed to:  [facility] clinic    Travel Screening: Not Applicable

## 2020-11-11 ENCOUNTER — APPOINTMENT (OUTPATIENT)
Dept: OPTOMETRY | Facility: CLINIC | Age: 35
End: 2020-11-11
Payer: COMMERCIAL

## 2020-11-11 PROCEDURE — 92340 FIT SPECTACLES MONOFOCAL: CPT | Performed by: OPTOMETRIST

## 2020-12-09 ENCOUNTER — OFFICE VISIT (OUTPATIENT)
Dept: INFECTIOUS DISEASES | Facility: CLINIC | Age: 35
End: 2020-12-09
Attending: INTERNAL MEDICINE
Payer: COMMERCIAL

## 2020-12-09 VITALS
DIASTOLIC BLOOD PRESSURE: 84 MMHG | TEMPERATURE: 98 F | HEART RATE: 63 BPM | HEIGHT: 64 IN | SYSTOLIC BLOOD PRESSURE: 134 MMHG | OXYGEN SATURATION: 98 % | WEIGHT: 242.2 LBS | BODY MASS INDEX: 41.35 KG/M2

## 2020-12-09 DIAGNOSIS — B20 HUMAN IMMUNODEFICIENCY VIRUS (HIV) DISEASE (H): ICD-10-CM

## 2020-12-09 DIAGNOSIS — G89.29 CHRONIC MYOFASCIAL PAIN: ICD-10-CM

## 2020-12-09 DIAGNOSIS — M79.18 CHRONIC MYOFASCIAL PAIN: ICD-10-CM

## 2020-12-09 DIAGNOSIS — K21.9 GASTROESOPHAGEAL REFLUX DISEASE, UNSPECIFIED WHETHER ESOPHAGITIS PRESENT: ICD-10-CM

## 2020-12-09 DIAGNOSIS — E66.01 MORBID OBESITY (H): ICD-10-CM

## 2020-12-09 DIAGNOSIS — B20 HUMAN IMMUNODEFICIENCY VIRUS (HIV) DISEASE (H): Primary | ICD-10-CM

## 2020-12-09 LAB
ALBUMIN SERPL-MCNC: 3.2 G/DL (ref 3.4–5)
ALP SERPL-CCNC: 62 U/L (ref 40–150)
ALT SERPL W P-5'-P-CCNC: 22 U/L (ref 0–50)
ANION GAP SERPL CALCULATED.3IONS-SCNC: 6 MMOL/L (ref 3–14)
AST SERPL W P-5'-P-CCNC: 20 U/L (ref 0–45)
BASOPHILS # BLD AUTO: 0 10E9/L (ref 0–0.2)
BASOPHILS NFR BLD AUTO: 0.8 %
BILIRUB SERPL-MCNC: 0.3 MG/DL (ref 0.2–1.3)
BUN SERPL-MCNC: 12 MG/DL (ref 7–30)
CALCIUM SERPL-MCNC: 8.4 MG/DL (ref 8.5–10.1)
CHLORIDE SERPL-SCNC: 107 MMOL/L (ref 94–109)
CO2 SERPL-SCNC: 26 MMOL/L (ref 20–32)
CREAT SERPL-MCNC: 0.8 MG/DL (ref 0.52–1.04)
DIFFERENTIAL METHOD BLD: ABNORMAL
EOSINOPHIL # BLD AUTO: 0.1 10E9/L (ref 0–0.7)
EOSINOPHIL NFR BLD AUTO: 2.2 %
ERYTHROCYTE [DISTWIDTH] IN BLOOD BY AUTOMATED COUNT: 13.4 % (ref 10–15)
GFR SERPL CREATININE-BSD FRML MDRD: >90 ML/MIN/{1.73_M2}
GLUCOSE SERPL-MCNC: 95 MG/DL (ref 70–99)
HCT VFR BLD AUTO: 39.7 % (ref 35–47)
HGB BLD-MCNC: 12.6 G/DL (ref 11.7–15.7)
IMM GRANULOCYTES # BLD: 0 10E9/L (ref 0–0.4)
IMM GRANULOCYTES NFR BLD: 0.3 %
LYMPHOCYTES # BLD AUTO: 1.5 10E9/L (ref 0.8–5.3)
LYMPHOCYTES NFR BLD AUTO: 39.7 %
MCH RBC QN AUTO: 28.4 PG (ref 26.5–33)
MCHC RBC AUTO-ENTMCNC: 31.7 G/DL (ref 31.5–36.5)
MCV RBC AUTO: 90 FL (ref 78–100)
MONOCYTES # BLD AUTO: 0.3 10E9/L (ref 0–1.3)
MONOCYTES NFR BLD AUTO: 8.7 %
NEUTROPHILS # BLD AUTO: 1.8 10E9/L (ref 1.6–8.3)
NEUTROPHILS NFR BLD AUTO: 48.3 %
NRBC # BLD AUTO: 0 10*3/UL
NRBC BLD AUTO-RTO: 0 /100
PLATELET # BLD AUTO: 213 10E9/L (ref 150–450)
POTASSIUM SERPL-SCNC: 3.3 MMOL/L (ref 3.4–5.3)
PROT SERPL-MCNC: 7.3 G/DL (ref 6.8–8.8)
RBC # BLD AUTO: 4.43 10E12/L (ref 3.8–5.2)
SODIUM SERPL-SCNC: 140 MMOL/L (ref 133–144)
WBC # BLD AUTO: 3.7 10E9/L (ref 4–11)

## 2020-12-09 PROCEDURE — G0463 HOSPITAL OUTPT CLINIC VISIT: HCPCS

## 2020-12-09 PROCEDURE — 86360 T CELL ABSOLUTE COUNT/RATIO: CPT | Mod: 90 | Performed by: PATHOLOGY

## 2020-12-09 PROCEDURE — 99000 SPECIMEN HANDLING OFFICE-LAB: CPT | Performed by: PATHOLOGY

## 2020-12-09 PROCEDURE — 99214 OFFICE O/P EST MOD 30 MIN: CPT | Performed by: STUDENT IN AN ORGANIZED HEALTH CARE EDUCATION/TRAINING PROGRAM

## 2020-12-09 PROCEDURE — 80053 COMPREHEN METABOLIC PANEL: CPT | Performed by: PATHOLOGY

## 2020-12-09 PROCEDURE — 86359 T CELLS TOTAL COUNT: CPT | Mod: 90 | Performed by: PATHOLOGY

## 2020-12-09 PROCEDURE — 36415 COLL VENOUS BLD VENIPUNCTURE: CPT | Performed by: PATHOLOGY

## 2020-12-09 RX ORDER — OXYCODONE AND ACETAMINOPHEN 5; 325 MG/1; MG/1
1 TABLET ORAL EVERY 6 HOURS PRN
Qty: 28 TABLET | Refills: 0 | Status: SHIPPED | OUTPATIENT
Start: 2020-12-09 | End: 2020-12-09 | Stop reason: DRUGHIGH

## 2020-12-09 RX ORDER — ONDANSETRON 4 MG/1
4 TABLET, FILM COATED ORAL EVERY 8 HOURS PRN
Qty: 24 TABLET | Refills: 0 | Status: SHIPPED | OUTPATIENT
Start: 2020-12-09 | End: 2021-02-23

## 2020-12-09 RX ORDER — OXYCODONE AND ACETAMINOPHEN 5; 325 MG/1; MG/1
1 TABLET ORAL EVERY 6 HOURS PRN
Qty: 28 TABLET | Refills: 0 | Status: SHIPPED | OUTPATIENT
Start: 2020-12-09 | End: 2020-12-09

## 2020-12-09 RX ORDER — ONDANSETRON 4 MG/1
4 TABLET, FILM COATED ORAL EVERY 8 HOURS PRN
Qty: 24 TABLET | Refills: 0 | Status: SHIPPED | OUTPATIENT
Start: 2020-12-09 | End: 2020-12-09

## 2020-12-09 RX ORDER — ONDANSETRON 4 MG/1
4 TABLET, FILM COATED ORAL EVERY 8 HOURS PRN
Qty: 24 TABLET | Refills: 0 | Status: SHIPPED | OUTPATIENT
Start: 2020-12-09 | End: 2020-12-09 | Stop reason: DRUGHIGH

## 2020-12-09 RX ORDER — OXYCODONE AND ACETAMINOPHEN 5; 325 MG/1; MG/1
1 TABLET ORAL EVERY 6 HOURS PRN
Qty: 28 TABLET | Refills: 0 | Status: SHIPPED | OUTPATIENT
Start: 2020-12-09 | End: 2021-03-16

## 2020-12-09 ASSESSMENT — PAIN SCALES - GENERAL: PAINLEVEL: NO PAIN (0)

## 2020-12-09 ASSESSMENT — MIFFLIN-ST. JEOR: SCORE: 1768.61

## 2020-12-09 NOTE — PROGRESS NOTES
E-scribing failed. Called and gave verbal orders for scripts to pharmacy at [facility].  Juany Watson RN

## 2020-12-09 NOTE — LETTER
"12/9/2020       RE: Bella Fung  [address]  [address]  [facility] [address]     Dear Colleague,    Thank you for referring your patient, Bella Fung, to the [facility] [place] [facility]. Please see a copy of my visit note below.     [facility]  Dr. Momo Wang MD, [facility], [address]  [address] [place] [address]   Patient:  Bella Fung, Date of birth 1985, Medical record number [id number]  Date of Visit:  12/09/2020         Assessment and Recommendations:     Ms. Fung is a 34yo female with HIV/AIDS, complicated by non-adherence, who presents for evaluation of odynophagia and nausea, as well as for follow-up for HIV/AIDS care.    1)  AIDS: Currently not taking ART (stopped Symtuza/Tivicay due to large pill size causing discomfort, switched back to her Descovy/Tivicay regimen for about 1 month while taking it irregularly, and then about 3.5 weeks ago stopped al medication due to nausea and throat irritation).  -Discontinue Symtuza and Tivicay (only took one dose). Have discussed interim methods we will use to try to reduce nausea/odynophagia, as well as shifting to smaller ART pill (though still a regiment with high barrier to resistance).  -Prescribed Biktarvy with plans for patient to start this medication once she has labs drawn today  -Patient to resume Bactrim (one SS daily) and Azithromycin (1200mg weekly)  -Pharmacist (Mary) to check in on patient next week to see how her adherence has been  -Plan for follow-up in 3-4 weeks (video or in-person, patient's preference)    2) Likely gastroesophageal reflux: Symptoms described by patient seem consistent with GERD (waking up with \"stuff in her mouth\", occasional emesis of stomach acid, burning sensation in throat, all in the absence of edilberto oropharyngeal infectious signs or other GI " "symptoms).  -Prescribed omeprazole 20mg BID to see if this will help with potential GERD  -Also prescribed short course of prn Zofran for nausea to help with ART adherence    3) Chronic back pain: Previously referred to pain clinic, though patient had some difficulty with follow-up recently.  -Prescribed short (7-day) supply of Percocet until patient can be seen by pain clinic    4) Diabetes: Patient has 10kg weight loss in the last 3 months (120.1 kg on 9/4/20) which she ascribed to \"knowing to eat better foods\" since diagnosis of diabetes, as well as recent nausea which has markedly reduced her intake.  -Discussed diabetes care and endocrine referral, patient did not want to address this currently.    5) Routine HIV care:              A. Prophylaxis:              - Continue bactrim single strength daily (toxo IgG negative)              - Continue azithromycin 1200mg weekly               B. Routine ID screening:              - Hep B negative 2012              - Hep C negative 2012, denies risk factors since.              - Quant gold negative 2013              - STIs (GC/chlam/RPR) negative 1/2018, reports \"side friend\" she has been active with, but states she is strict abbot barrier protection and neither she nor partner have signs/sx of STIs              - CMV IgG positive              - Toxo IgG negative 2013              C. Vaccination status:              - HAV IgG negative; no indication for vaccination              - HBV immune              - P23 (2012), P13 (2016), pneumovax booster 10/2019.              - Tdap 2018              - No indication for menactra              D. Bone Health:              - No screening indicated at this time.               E. Renal/CV:              - Cr has been normal, new BMP ordered today              - Will address BP once HIV under better control              - Diabetes as above.               - Ongoing daily marijuana use; no tobacco use.       Momo Wang " "MD  Division of Infectious Diseases and International Medicine         History of Infectious Disease Illness:     Cristina uFng is a 36yo female with a history of HIV/AIDS complicated by intermittent adherence and frequent physical complaints she feels are related to ART, making it difficult to remain on these medications. She was last seen by our clinic via virtual visit with Dr. Noguera on 9/4/2020. At that time, she had been off of ART for one month pending HIV genotyping, but the lab was unable to be completed (low blood draw volume) and patient did not present for follow-up draw. At the 9/4 visit, it was deemed more important to get patient back on ART due to low CD4 and rising HIV RNA than to wait for genotype, so she was started on a Symtuza + tivicay for a boosted PI + Insti regimen (very high barrier to resistance).     At today's visit, Ms. Fung states that she took one dose of the Symtuza and thens topped it due to the large pill size being difficult for her to take. She instead switched back to her old Descovy + Tivicay regimen, though admits she only took this occasionally (a couple times per week). About 3.5 weeks ago (early/mid-November), she began having occasional nausea, as well as a discomfort in her throat that has made it hard to swallow and retain food or pills, so she has not taken her OI prophylaxis or ART since this began. She states that she will often wake up with \"a bunch of clear saliva\" in the back of her throat, and that she sleeps \"a lot\" so is on her back frequently without head elevated. When she has had emesis, she states it looks \"clear, kind of yellowish\". She denies abdominal pain, diarrhea, hematemasis, chest pain, cough, melena. She has no sick contacts and no contacts or symptoms of COVID. She has bee neating very little lately, mostly brothy soups and juices.    When discussing ART, she is willing and eager to get back on therapy, provided her throat can be " treated. Her  is currently hospitalized with CNS lymphoma, and she states she was told it was likely caused by lack of treatment of HIV and she does not want this to happen to her.        Past Medical and Surgical History:     Past Medical History:   Diagnosis Date     Chronic pain      Diabetes (H) 2019     Never on therapy.      HIV (human immunodeficiency virus infection) (H)     Diagnosed 2011. Initially followed at [facility]. Opportunistic Infections: None. HLA  Status: Negative 2012. Historical use of ARVs: Truvada, Reyataz, and Norvir from 2012 until 8/2013; switched atazanavir to darunavir 8/2013; emtricitabine-tenofovir and darunavir-cobicistat 10/2014- around 2016, Descovy and Prezcobix in 2017, then ~ 11/2017 to Descovy and Dolutegravir. Adherence very poor since       History reviewed. No pertinent surgical history.        Family History:     Family History   Problem Relation Age of Onset     Glaucoma Paternal Grandmother            Social History:     Social History     Tobacco Use     Smoking status: Current Every Day Smoker     Packs/day: 0.00     Types: Other     Smokeless tobacco: Never Used     Tobacco comment: smokes marijuana   Substance Use Topics     Alcohol use: Not Currently     Drug use: Yes     Types: Marijuana     Comment: Daily.      Social History     Social History Narrative     Not on file            Review of Systems:   CONSTITUTIONAL:  No fevers or chills  EYES: Negative for icterus or vision change  ENT:  Positive for odynophagia.  Negative for hearing loss, tinnitus.  RESPIRATORY:  Negative for cough, sputum or dyspnea  CARDIOVASCULAR:  Negative for chest pain, palpitations  GASTROINTESTINAL:  Positive for occasional nausea with yellow-clear emesis. Negative for diarrhea or constipation  GENITOURINARY:  Negative for dysuria  HEME:  No easy bruising  INTEGUMENT:  Negative for rash or pruritus  NEURO:  Negative for headache         Current Medications:     Current Outpatient  "Medications   Medication Sig Dispense Refill     albuterol (PROVENTIL HFA) 108 (90 Base) MCG/ACT inhaler Inhale 1-2 puffs into the lungs       azithromycin (ZITHROMAX) 600 MG tablet Take 2 tablets (1,200 mg) by mouth every 7 days DO NOT TAKE DAILY. TAKE 2 PILLS ONCE WEEKLY ON THE SAME DAY OF THE WEEK. 10 tablet 4     ibuprofen (ADVIL/MOTRIN) 600 MG tablet Take 600 mg by mouth       methocarbamol (ROBAXIN) 750 MG tablet Take 1 tablet (750 mg) by mouth 4 times daily as needed for muscle spasms 90 tablet 1     naloxone (NARCAN) 4 MG/0.1ML nasal spray Spray 1 spray (4 mg) into one nostril alternating nostrils once as needed for opioid reversal every 2-3 minutes until assistance arrives 0.2 mL 0     oxyCODONE-acetaminophen (PERCOCET) 5-325 MG tablet Take 1-2 tablets by mouth every 6 hours as needed for severe pain 35 tablet 0     sulfamethoxazole-trimethoprim (BACTRIM) 400-80 MG tablet Take 1 tablet by mouth daily 90 tablet 1     traMADol (ULTRAM) 50 MG tablet Take 1 tablet (50 mg) by mouth 3 times daily 90 tablet 0     bictegravir-emtricitabine-tenofovir (BIKTARVY) -25 MG per tablet Take 1 tablet by mouth daily 30 tablet 1     omeprazole (PRILOSEC) 20 MG DR capsule Take 1 capsule (20 mg) by mouth 2 times daily 60 capsule 1     ondansetron (ZOFRAN) 4 MG tablet Take 1 tablet (4 mg) by mouth every 8 hours as needed for nausea 24 tablet 0     oxyCODONE-acetaminophen (PERCOCET) 5-325 MG tablet Take 1 tablet by mouth every 6 hours as needed for severe pain 28 tablet 0            Immunization History:     Immunization History   Administered Date(s) Administered     Pneumococcal 23 valent 10/31/2019            Allergies:   No Known Allergies         Physical Exam:   Vital signs:  /84   Pulse 63   Temp 98  F (36.7  C) (Oral)   Ht 1.626 m (5' 4\")   Wt 109.9 kg (242 lb 3.2 oz)   SpO2 98%   BMI 41.57 kg/m      Physical Examination:  GENERAL:  Well-developed, well-nourished, seated in no acute distress.  HEENT:  " Head is normocephalic, atraumatic   EYES:  Eyes have anicteric sclerae without conjunctival injection   ENT:  Visualized potion of oropharynx without exudates or erythema. Tongue midline.  NECK:  Supple. No cervical lymphadenopathy  LUNGS:  Breathing comfortable without difficulty. No cough present on exam.   CARDIOVASCULAR:  No notable palpitations or tachycardia.  ABDOMEN:  Soft, nontender. No appreciable hepatosplenomegaly.  SKIN:  No acute rashes.    NEUROLOGIC:  Grossly nonfocal. Active x4 extremities         Laboratory Data:     Metabolic Studies   Sodium   Date Value Ref Range Status   07/31/2020 136 133 - 144 mmol/L Final   10/31/2019 139 133 - 144 mmol/L Final     Potassium   Date Value Ref Range Status   07/31/2020 4.0 3.4 - 5.3 mmol/L Final   10/31/2019 4.0 3.4 - 5.3 mmol/L Final     Chloride   Date Value Ref Range Status   07/31/2020 106 94 - 109 mmol/L Final   10/31/2019 107 94 - 109 mmol/L Final     Carbon Dioxide   Date Value Ref Range Status   07/31/2020 25 20 - 32 mmol/L Final   10/31/2019 27 20 - 32 mmol/L Final     Anion Gap   Date Value Ref Range Status   07/31/2020 5 3 - 14 mmol/L Final   10/31/2019 5 3 - 14 mmol/L Final     Urea Nitrogen   Date Value Ref Range Status   07/31/2020 15 7 - 30 mg/dL Final   10/31/2019 12 7 - 30 mg/dL Final     Creatinine   Date Value Ref Range Status   07/31/2020 0.90 0.52 - 1.04 mg/dL Final   10/31/2019 0.87 0.52 - 1.04 mg/dL Final     GFR Estimate   Date Value Ref Range Status   07/31/2020 82 >60 mL/min/[1.73_m2] Final     Comment:     Non  GFR Calc  Starting 12/18/2018, serum creatinine based estimated GFR (eGFR) will be   calculated using the Chronic Kidney Disease Epidemiology Collaboration   (CKD-EPI) equation.     10/31/2019 86 >60 mL/min/[1.73_m2] Final     Comment:     Non  GFR Calc  Starting 12/18/2018, serum creatinine based estimated GFR (eGFR) will be   calculated using the Chronic Kidney Disease Epidemiology  Collaboration   (CKD-EPI) equation.       Glucose   Date Value Ref Range Status   07/31/2020 108 (H) 70 - 99 mg/dL Final   10/31/2019 109 (H) 70 - 99 mg/dL Final     Hemoglobin A1C   Date Value Ref Range Status   07/31/2020 6.3 (H) 0 - 5.6 % Final     Comment:     Normal <5.7% Prediabetes 5.7-6.4%  Diabetes 6.5% or higher - adopted from ADA   consensus guidelines.       Calcium   Date Value Ref Range Status   07/31/2020 8.3 (L) 8.5 - 10.1 mg/dL Final   10/31/2019 8.5 8.5 - 10.1 mg/dL Final       Inflammatory Markers No results found for: CRP    Hepatic Studies    Bilirubin Total   Date Value Ref Range Status   07/31/2020 0.4 0.2 - 1.3 mg/dL Final   10/31/2019 0.3 0.2 - 1.3 mg/dL Final     Alkaline Phosphatase   Date Value Ref Range Status   07/31/2020 60 40 - 150 U/L Final   10/31/2019 64 40 - 150 U/L Final     Albumin   Date Value Ref Range Status   07/31/2020 3.2 (L) 3.4 - 5.0 g/dL Final   10/31/2019 3.2 (L) 3.4 - 5.0 g/dL Final     AST   Date Value Ref Range Status   07/31/2020 16 0 - 45 U/L Final   10/31/2019 20 0 - 45 U/L Final     ALT   Date Value Ref Range Status   07/31/2020 17 0 - 50 U/L Final   10/31/2019 17 0 - 50 U/L Final       Hematology Studies      WBC   Date Value Ref Range Status   07/31/2020 3.0 (L) 4.0 - 11.0 10e9/L Final   10/31/2019 2.7 (L) 4.0 - 11.0 10e9/L Final     Absolute Neutrophil   Date Value Ref Range Status   07/31/2020 1.2 (L) 1.6 - 8.3 10e9/L Final   10/31/2019 1.0 (L) 1.6 - 8.3 10e9/L Final     Absolute Lymphocytes   Date Value Ref Range Status   07/31/2020 1.4 0.8 - 5.3 10e9/L Final   10/31/2019 1.3 0.8 - 5.3 10e9/L Final     Absolute Monocytes   Date Value Ref Range Status   07/31/2020 0.3 0.0 - 1.3 10e9/L Final   10/31/2019 0.3 0.0 - 1.3 10e9/L Final     Absolute Eosinophils   Date Value Ref Range Status   07/31/2020 0.1 0.0 - 0.7 10e9/L Final   10/31/2019 0.1 0.0 - 0.7 10e9/L Final     Hemoglobin   Date Value Ref Range Status   07/31/2020 12.3 11.7 - 15.7 g/dL Final    10/31/2019 11.7 11.7 - 15.7 g/dL Final     Hematocrit   Date Value Ref Range Status   07/31/2020 38.5 35.0 - 47.0 % Final   10/31/2019 37.1 35.0 - 47.0 % Final     Platelet Count   Date Value Ref Range Status   07/31/2020 202 150 - 450 10e9/L Final   10/31/2019 208 150 - 450 10e9/L Final       Again, thank you for allowing me to participate in the care of your patient.      Sincerely,    Momo Wang MD

## 2020-12-09 NOTE — PATIENT INSTRUCTIONS
Thanks for coming in today! Let us know if you have any problems with your new medicines or if any new problems come up.    -Robert Wang MD

## 2020-12-09 NOTE — PROGRESS NOTES
" [facility]  Dr. Momo Wang MD, [facility], Floor [address]  [address] [place] [address]   Patient:  Bella Fung, Date of birth 1985, Medical record number [id number]  Date of Visit:  12/09/2020         Assessment and Recommendations:     Ms. Fung is a 34yo female with HIV/AIDS, complicated by non-adherence, who presents for evaluation of odynophagia and nausea, as well as for follow-up for HIV/AIDS care.    1)  AIDS: Currently not taking ART (stopped Symtuza/Tivicay due to large pill size causing discomfort, switched back to her Descovy/Tivicay regimen for about 1 month while taking it irregularly, and then about 3.5 weeks ago stopped al medication due to nausea and throat irritation).  -Discontinue Symtuza and Tivicay (only took one dose). Have discussed interim methods we will use to try to reduce nausea/odynophagia, as well as shifting to smaller ART pill (though still a regiment with high barrier to resistance).  -Prescribed Biktarvy with plans for patient to start this medication once she has labs drawn today  -Patient to resume Bactrim (one SS daily) and Azithromycin (1200mg weekly)  -Pharmacist (Mary) to check in on patient next week to see how her adherence has been  -Plan for follow-up in 3-4 weeks (video or in-person, patient's preference)    2) Likely gastroesophageal reflux: Symptoms described by patient seem consistent with GERD (waking up with \"stuff in her mouth\", occasional emesis of stomach acid, burning sensation in throat, all in the absence of edilberto oropharyngeal infectious signs or other GI symptoms).  -Prescribed omeprazole 20mg BID to see if this will help with potential GERD  -Also prescribed short course of prn Zofran for nausea to help with ART adherence    3) Chronic back pain: Previously referred to pain clinic, though patient had some difficulty with follow-up recently.  -Prescribed short (7-day) supply of " "Percocet until patient can be seen by pain clinic    4) Diabetes: Patient has 10kg weight loss in the last 3 months (120.1 kg on 9/4/20) which she ascribed to \"knowing to eat better foods\" since diagnosis of diabetes, as well as recent nausea which has markedly reduced her intake.  -Discussed diabetes care and endocrine referral, patient did not want to address this currently.    5) Routine HIV care:              A. Prophylaxis:              - Continue bactrim single strength daily (toxo IgG negative)              - Continue azithromycin 1200mg weekly               B. Routine ID screening:              - Hep B negative 2012              - Hep C negative 2012, denies risk factors since.              - Quant gold negative 2013              - STIs (GC/chlam/RPR) negative 1/2018, reports \"side friend\" she has been active with, but states she is strict abbot barrier protection and neither she nor partner have signs/sx of STIs              - CMV IgG positive              - Toxo IgG negative 2013              C. Vaccination status:              - HAV IgG negative; no indication for vaccination              - HBV immune              - P23 (2012), P13 (2016), pneumovax booster 10/2019.              - Tdap 2018              - No indication for menactra              D. Bone Health:              - No screening indicated at this time.               E. Renal/CV:              - Cr has been normal, new BMP ordered today              - Will address BP once HIV under better control              - Diabetes as above.               - Ongoing daily marijuana use; no tobacco use.       Momo Wang MD  Division of Infectious Diseases and International Medicine         History of Infectious Disease Illness:     Cristina Fung is a 36yo female with a history of HIV/AIDS complicated by intermittent adherence and frequent physical complaints she feels are related to ART, making it difficult to remain on these medications. She was last " "seen by our clinic via virtual visit with Dr. Noguera on 9/4/2020. At that time, she had been off of ART for one month pending HIV genotyping, but the lab was unable to be completed (low blood draw volume) and patient did not present for follow-up draw. At the 9/4 visit, it was deemed more important to get patient back on ART due to low CD4 and rising HIV RNA than to wait for genotype, so she was started on a Symtuza + tivicay for a boosted PI + Insti regimen (very high barrier to resistance).     At today's visit, Ms. Fung states that she took one dose of the Symtuza and thens topped it due to the large pill size being difficult for her to take. She instead switched back to her old Descovy + Tivicay regimen, though admits she only took this occasionally (a couple times per week). About 3.5 weeks ago (early/mid-November), she began having occasional nausea, as well as a discomfort in her throat that has made it hard to swallow and retain food or pills, so she has not taken her OI prophylaxis or ART since this began. She states that she will often wake up with \"a bunch of clear saliva\" in the back of her throat, and that she sleeps \"a lot\" so is on her back frequently without head elevated. When she has had emesis, she states it looks \"clear, kind of yellowish\". She denies abdominal pain, diarrhea, hematemasis, chest pain, cough, melena. She has no sick contacts and no contacts or symptoms of COVID. She has bee neating very little lately, mostly brothy soups and juices.    When discussing ART, she is willing and eager to get back on therapy, provided her throat can be treated. Her  is currently hospitalized with CNS lymphoma, and she states she was told it was likely caused by lack of treatment of HIV and she does not want this to happen to her.        Past Medical and Surgical History:     Past Medical History:   Diagnosis Date     Chronic pain      Diabetes (H) 2019     Never on therapy.      HIV " (human immunodeficiency virus infection) (H)     Diagnosed 2011. Initially followed at [facility]. Opportunistic Infections: None. HLA  Status: Negative 2012. Historical use of ARVs: Truvada, Reyataz, and Norvir from 2012 until 8/2013; switched atazanavir to darunavir 8/2013; emtricitabine-tenofovir and darunavir-cobicistat 10/2014- around 2016, Descovy and Prezcobix in 2017, then ~ 11/2017 to Descovy and Dolutegravir. Adherence very poor since       History reviewed. No pertinent surgical history.        Family History:     Family History   Problem Relation Age of Onset     Glaucoma Paternal Grandmother            Social History:     Social History     Tobacco Use     Smoking status: Current Every Day Smoker     Packs/day: 0.00     Types: Other     Smokeless tobacco: Never Used     Tobacco comment: smokes marijuana   Substance Use Topics     Alcohol use: Not Currently     Drug use: Yes     Types: Marijuana     Comment: Daily.      Social History     Social History Narrative     Not on file            Review of Systems:   CONSTITUTIONAL:  No fevers or chills  EYES: Negative for icterus or vision change  ENT:  Positive for odynophagia.  Negative for hearing loss, tinnitus.  RESPIRATORY:  Negative for cough, sputum or dyspnea  CARDIOVASCULAR:  Negative for chest pain, palpitations  GASTROINTESTINAL:  Positive for occasional nausea with yellow-clear emesis. Negative for diarrhea or constipation  GENITOURINARY:  Negative for dysuria  HEME:  No easy bruising  INTEGUMENT:  Negative for rash or pruritus  NEURO:  Negative for headache         Current Medications:     Current Outpatient Medications   Medication Sig Dispense Refill     albuterol (PROVENTIL HFA) 108 (90 Base) MCG/ACT inhaler Inhale 1-2 puffs into the lungs       azithromycin (ZITHROMAX) 600 MG tablet Take 2 tablets (1,200 mg) by mouth every 7 days DO NOT TAKE DAILY. TAKE 2 PILLS ONCE WEEKLY ON THE SAME DAY OF THE WEEK. 10 tablet 4     ibuprofen (ADVIL/MOTRIN)  "600 MG tablet Take 600 mg by mouth       methocarbamol (ROBAXIN) 750 MG tablet Take 1 tablet (750 mg) by mouth 4 times daily as needed for muscle spasms 90 tablet 1     naloxone (NARCAN) 4 MG/0.1ML nasal spray Spray 1 spray (4 mg) into one nostril alternating nostrils once as needed for opioid reversal every 2-3 minutes until assistance arrives 0.2 mL 0     oxyCODONE-acetaminophen (PERCOCET) 5-325 MG tablet Take 1-2 tablets by mouth every 6 hours as needed for severe pain 35 tablet 0     sulfamethoxazole-trimethoprim (BACTRIM) 400-80 MG tablet Take 1 tablet by mouth daily 90 tablet 1     traMADol (ULTRAM) 50 MG tablet Take 1 tablet (50 mg) by mouth 3 times daily 90 tablet 0     bictegravir-emtricitabine-tenofovir (BIKTARVY) -25 MG per tablet Take 1 tablet by mouth daily 30 tablet 1     omeprazole (PRILOSEC) 20 MG DR capsule Take 1 capsule (20 mg) by mouth 2 times daily 60 capsule 1     ondansetron (ZOFRAN) 4 MG tablet Take 1 tablet (4 mg) by mouth every 8 hours as needed for nausea 24 tablet 0     oxyCODONE-acetaminophen (PERCOCET) 5-325 MG tablet Take 1 tablet by mouth every 6 hours as needed for severe pain 28 tablet 0            Immunization History:     Immunization History   Administered Date(s) Administered     Pneumococcal 23 valent 10/31/2019            Allergies:   No Known Allergies         Physical Exam:   Vital signs:  /84   Pulse 63   Temp 98  F (36.7  C) (Oral)   Ht 1.626 m (5' 4\")   Wt 109.9 kg (242 lb 3.2 oz)   SpO2 98%   BMI 41.57 kg/m      Physical Examination:  GENERAL:  Well-developed, well-nourished, seated in no acute distress.  HEENT:  Head is normocephalic, atraumatic   EYES:  Eyes have anicteric sclerae without conjunctival injection   ENT:  Visualized potion of oropharynx without exudates or erythema. Tongue midline.  NECK:  Supple. No cervical lymphadenopathy  LUNGS:  Breathing comfortable without difficulty. No cough present on exam.   CARDIOVASCULAR:  No notable " palpitations or tachycardia.  ABDOMEN:  Soft, nontender. No appreciable hepatosplenomegaly.  SKIN:  No acute rashes.    NEUROLOGIC:  Grossly nonfocal. Active x4 extremities         Laboratory Data:     Metabolic Studies   Sodium   Date Value Ref Range Status   07/31/2020 136 133 - 144 mmol/L Final   10/31/2019 139 133 - 144 mmol/L Final     Potassium   Date Value Ref Range Status   07/31/2020 4.0 3.4 - 5.3 mmol/L Final   10/31/2019 4.0 3.4 - 5.3 mmol/L Final     Chloride   Date Value Ref Range Status   07/31/2020 106 94 - 109 mmol/L Final   10/31/2019 107 94 - 109 mmol/L Final     Carbon Dioxide   Date Value Ref Range Status   07/31/2020 25 20 - 32 mmol/L Final   10/31/2019 27 20 - 32 mmol/L Final     Anion Gap   Date Value Ref Range Status   07/31/2020 5 3 - 14 mmol/L Final   10/31/2019 5 3 - 14 mmol/L Final     Urea Nitrogen   Date Value Ref Range Status   07/31/2020 15 7 - 30 mg/dL Final   10/31/2019 12 7 - 30 mg/dL Final     Creatinine   Date Value Ref Range Status   07/31/2020 0.90 0.52 - 1.04 mg/dL Final   10/31/2019 0.87 0.52 - 1.04 mg/dL Final     GFR Estimate   Date Value Ref Range Status   07/31/2020 82 >60 mL/min/[1.73_m2] Final     Comment:     Non  GFR Calc  Starting 12/18/2018, serum creatinine based estimated GFR (eGFR) will be   calculated using the Chronic Kidney Disease Epidemiology Collaboration   (CKD-EPI) equation.     10/31/2019 86 >60 mL/min/[1.73_m2] Final     Comment:     Non  GFR Calc  Starting 12/18/2018, serum creatinine based estimated GFR (eGFR) will be   calculated using the Chronic Kidney Disease Epidemiology Collaboration   (CKD-EPI) equation.       Glucose   Date Value Ref Range Status   07/31/2020 108 (H) 70 - 99 mg/dL Final   10/31/2019 109 (H) 70 - 99 mg/dL Final     Hemoglobin A1C   Date Value Ref Range Status   07/31/2020 6.3 (H) 0 - 5.6 % Final     Comment:     Normal <5.7% Prediabetes 5.7-6.4%  Diabetes 6.5% or higher - adopted from ADA    consensus guidelines.       Calcium   Date Value Ref Range Status   07/31/2020 8.3 (L) 8.5 - 10.1 mg/dL Final   10/31/2019 8.5 8.5 - 10.1 mg/dL Final       Inflammatory Markers No results found for: CRP    Hepatic Studies    Bilirubin Total   Date Value Ref Range Status   07/31/2020 0.4 0.2 - 1.3 mg/dL Final   10/31/2019 0.3 0.2 - 1.3 mg/dL Final     Alkaline Phosphatase   Date Value Ref Range Status   07/31/2020 60 40 - 150 U/L Final   10/31/2019 64 40 - 150 U/L Final     Albumin   Date Value Ref Range Status   07/31/2020 3.2 (L) 3.4 - 5.0 g/dL Final   10/31/2019 3.2 (L) 3.4 - 5.0 g/dL Final     AST   Date Value Ref Range Status   07/31/2020 16 0 - 45 U/L Final   10/31/2019 20 0 - 45 U/L Final     ALT   Date Value Ref Range Status   07/31/2020 17 0 - 50 U/L Final   10/31/2019 17 0 - 50 U/L Final       Hematology Studies      WBC   Date Value Ref Range Status   07/31/2020 3.0 (L) 4.0 - 11.0 10e9/L Final   10/31/2019 2.7 (L) 4.0 - 11.0 10e9/L Final     Absolute Neutrophil   Date Value Ref Range Status   07/31/2020 1.2 (L) 1.6 - 8.3 10e9/L Final   10/31/2019 1.0 (L) 1.6 - 8.3 10e9/L Final     Absolute Lymphocytes   Date Value Ref Range Status   07/31/2020 1.4 0.8 - 5.3 10e9/L Final   10/31/2019 1.3 0.8 - 5.3 10e9/L Final     Absolute Monocytes   Date Value Ref Range Status   07/31/2020 0.3 0.0 - 1.3 10e9/L Final   10/31/2019 0.3 0.0 - 1.3 10e9/L Final     Absolute Eosinophils   Date Value Ref Range Status   07/31/2020 0.1 0.0 - 0.7 10e9/L Final   10/31/2019 0.1 0.0 - 0.7 10e9/L Final     Hemoglobin   Date Value Ref Range Status   07/31/2020 12.3 11.7 - 15.7 g/dL Final   10/31/2019 11.7 11.7 - 15.7 g/dL Final     Hematocrit   Date Value Ref Range Status   07/31/2020 38.5 35.0 - 47.0 % Final   10/31/2019 37.1 35.0 - 47.0 % Final     Platelet Count   Date Value Ref Range Status   07/31/2020 202 150 - 450 10e9/L Final   10/31/2019 208 150 - 450 10e9/L Final

## 2020-12-09 NOTE — LETTER
"12/9/2020      RE: Bella Fung  [address]  [address]  [facility] [address]        [facility]  Dr. Momo Wang MD, [facility], Floor [address]  [address] [place] [address]   Patient:  Bella Fung, Date of birth 1985, Medical record number [id number]  Date of Visit:  12/09/2020         Assessment and Recommendations:     Ms. Fung is a 34yo female with HIV/AIDS, complicated by non-adherence, who presents for evaluation of odynophagia and nausea, as well as for follow-up for HIV/AIDS care.    1)  AIDS: Currently not taking ART (stopped Symtuza/Tivicay due to large pill size causing discomfort, switched back to her Descovy/Tivicay regimen for about 1 month while taking it irregularly, and then about 3.5 weeks ago stopped al medication due to nausea and throat irritation).  -Discontinue Symtuza and Tivicay (only took one dose). Have discussed interim methods we will use to try to reduce nausea/odynophagia, as well as shifting to smaller ART pill (though still a regiment with high barrier to resistance).  -Prescribed Biktarvy with plans for patient to start this medication once she has labs drawn today  -Patient to resume Bactrim (one SS daily) and Azithromycin (1200mg weekly)  -Pharmacist (Mary) to check in on patient next week to see how her adherence has been  -Plan for follow-up in 3-4 weeks (video or in-person, patient's preference)    2) Likely gastroesophageal reflux: Symptoms described by patient seem consistent with GERD (waking up with \"stuff in her mouth\", occasional emesis of stomach acid, burning sensation in throat, all in the absence of edilberto oropharyngeal infectious signs or other GI symptoms).  -Prescribed omeprazole 20mg BID to see if this will help with potential GERD  -Also prescribed short course of prn Zofran for nausea to help with ART adherence    3) Chronic back pain: Previously referred to pain clinic, though patient " "had some difficulty with follow-up recently.  -Prescribed short (7-day) supply of Percocet until patient can be seen by pain clinic    4) Diabetes: Patient has 10kg weight loss in the last 3 months (120.1 kg on 9/4/20) which she ascribed to \"knowing to eat better foods\" since diagnosis of diabetes, as well as recent nausea which has markedly reduced her intake.  -Discussed diabetes care and endocrine referral, patient did not want to address this currently.    5) Routine HIV care:              A. Prophylaxis:              - Continue bactrim single strength daily (toxo IgG negative)              - Continue azithromycin 1200mg weekly               B. Routine ID screening:              - Hep B negative 2012              - Hep C negative 2012, denies risk factors since.              - Quant gold negative 2013              - STIs (GC/chlam/RPR) negative 1/2018, reports \"side friend\" she has been active with, but states she is strict abbot barrier protection and neither she nor partner have signs/sx of STIs              - CMV IgG positive              - Toxo IgG negative 2013              C. Vaccination status:              - HAV IgG negative; no indication for vaccination              - HBV immune              - P23 (2012), P13 (2016), pneumovax booster 10/2019.              - Tdap 2018              - No indication for menactra              D. Bone Health:              - No screening indicated at this time.               E. Renal/CV:              - Cr has been normal, new BMP ordered today              - Will address BP once HIV under better control              - Diabetes as above.               - Ongoing daily marijuana use; no tobacco use.       Momo Wang MD  Division of Infectious Diseases and International Medicine         History of Infectious Disease Illness:     Cristina Fung is a 34yo female with a history of HIV/AIDS complicated by intermittent adherence and frequent physical complaints she feels are " "related to ART, making it difficult to remain on these medications. She was last seen by our clinic via virtual visit with Dr. Noguera on 9/4/2020. At that time, she had been off of ART for one month pending HIV genotyping, but the lab was unable to be completed (low blood draw volume) and patient did not present for follow-up draw. At the 9/4 visit, it was deemed more important to get patient back on ART due to low CD4 and rising HIV RNA than to wait for genotype, so she was started on a Symtuza + tivicay for a boosted PI + Insti regimen (very high barrier to resistance).     At today's visit, Ms. Fung states that she took one dose of the Symtuza and thens topped it due to the large pill size being difficult for her to take. She instead switched back to her old Descovy + Tivicay regimen, though admits she only took this occasionally (a couple times per week). About 3.5 weeks ago (early/mid-November), she began having occasional nausea, as well as a discomfort in her throat that has made it hard to swallow and retain food or pills, so she has not taken her OI prophylaxis or ART since this began. She states that she will often wake up with \"a bunch of clear saliva\" in the back of her throat, and that she sleeps \"a lot\" so is on her back frequently without head elevated. When she has had emesis, she states it looks \"clear, kind of yellowish\". She denies abdominal pain, diarrhea, hematemasis, chest pain, cough, melena. She has no sick contacts and no contacts or symptoms of COVID. She has bee neating very little lately, mostly brothy soups and juices.    When discussing ART, she is willing and eager to get back on therapy, provided her throat can be treated. Her  is currently hospitalized with CNS lymphoma, and she states she was told it was likely caused by lack of treatment of HIV and she does not want this to happen to her.        Past Medical and Surgical History:     Past Medical History: "   Diagnosis Date     Chronic pain      Diabetes (H) 2019     Never on therapy.      HIV (human immunodeficiency virus infection) (H)     Diagnosed 2011. Initially followed at [facility]. Opportunistic Infections: None. HLA  Status: Negative 2012. Historical use of ARVs: Truvada, Reyataz, and Norvir from 2012 until 8/2013; switched atazanavir to darunavir 8/2013; emtricitabine-tenofovir and darunavir-cobicistat 10/2014- around 2016, Descovy and Prezcobix in 2017, then ~ 11/2017 to Descovy and Dolutegravir. Adherence very poor since       History reviewed. No pertinent surgical history.        Family History:     Family History   Problem Relation Age of Onset     Glaucoma Paternal Grandmother            Social History:     Social History     Tobacco Use     Smoking status: Current Every Day Smoker     Packs/day: 0.00     Types: Other     Smokeless tobacco: Never Used     Tobacco comment: smokes marijuana   Substance Use Topics     Alcohol use: Not Currently     Drug use: Yes     Types: Marijuana     Comment: Daily.      Social History     Social History Narrative     Not on file            Review of Systems:   CONSTITUTIONAL:  No fevers or chills  EYES: Negative for icterus or vision change  ENT:  Positive for odynophagia.  Negative for hearing loss, tinnitus.  RESPIRATORY:  Negative for cough, sputum or dyspnea  CARDIOVASCULAR:  Negative for chest pain, palpitations  GASTROINTESTINAL:  Positive for occasional nausea with yellow-clear emesis. Negative for diarrhea or constipation  GENITOURINARY:  Negative for dysuria  HEME:  No easy bruising  INTEGUMENT:  Negative for rash or pruritus  NEURO:  Negative for headache         Current Medications:     Current Outpatient Medications   Medication Sig Dispense Refill     albuterol (PROVENTIL HFA) 108 (90 Base) MCG/ACT inhaler Inhale 1-2 puffs into the lungs       azithromycin (ZITHROMAX) 600 MG tablet Take 2 tablets (1,200 mg) by mouth every 7 days DO NOT TAKE DAILY. TAKE 2  "PILLS ONCE WEEKLY ON THE SAME DAY OF THE WEEK. 10 tablet 4     ibuprofen (ADVIL/MOTRIN) 600 MG tablet Take 600 mg by mouth       methocarbamol (ROBAXIN) 750 MG tablet Take 1 tablet (750 mg) by mouth 4 times daily as needed for muscle spasms 90 tablet 1     naloxone (NARCAN) 4 MG/0.1ML nasal spray Spray 1 spray (4 mg) into one nostril alternating nostrils once as needed for opioid reversal every 2-3 minutes until assistance arrives 0.2 mL 0     oxyCODONE-acetaminophen (PERCOCET) 5-325 MG tablet Take 1-2 tablets by mouth every 6 hours as needed for severe pain 35 tablet 0     sulfamethoxazole-trimethoprim (BACTRIM) 400-80 MG tablet Take 1 tablet by mouth daily 90 tablet 1     traMADol (ULTRAM) 50 MG tablet Take 1 tablet (50 mg) by mouth 3 times daily 90 tablet 0     bictegravir-emtricitabine-tenofovir (BIKTARVY) -25 MG per tablet Take 1 tablet by mouth daily 30 tablet 1     omeprazole (PRILOSEC) 20 MG DR capsule Take 1 capsule (20 mg) by mouth 2 times daily 60 capsule 1     ondansetron (ZOFRAN) 4 MG tablet Take 1 tablet (4 mg) by mouth every 8 hours as needed for nausea 24 tablet 0     oxyCODONE-acetaminophen (PERCOCET) 5-325 MG tablet Take 1 tablet by mouth every 6 hours as needed for severe pain 28 tablet 0            Immunization History:     Immunization History   Administered Date(s) Administered     Pneumococcal 23 valent 10/31/2019            Allergies:   No Known Allergies         Physical Exam:   Vital signs:  /84   Pulse 63   Temp 98  F (36.7  C) (Oral)   Ht 1.626 m (5' 4\")   Wt 109.9 kg (242 lb 3.2 oz)   SpO2 98%   BMI 41.57 kg/m      Physical Examination:  GENERAL:  Well-developed, well-nourished, seated in no acute distress.  HEENT:  Head is normocephalic, atraumatic   EYES:  Eyes have anicteric sclerae without conjunctival injection   ENT:  Visualized potion of oropharynx without exudates or erythema. Tongue midline.  NECK:  Supple. No cervical lymphadenopathy  LUNGS:  Breathing " comfortable without difficulty. No cough present on exam.   CARDIOVASCULAR:  No notable palpitations or tachycardia.  ABDOMEN:  Soft, nontender. No appreciable hepatosplenomegaly.  SKIN:  No acute rashes.    NEUROLOGIC:  Grossly nonfocal. Active x4 extremities         Laboratory Data:     Metabolic Studies   Sodium   Date Value Ref Range Status   07/31/2020 136 133 - 144 mmol/L Final   10/31/2019 139 133 - 144 mmol/L Final     Potassium   Date Value Ref Range Status   07/31/2020 4.0 3.4 - 5.3 mmol/L Final   10/31/2019 4.0 3.4 - 5.3 mmol/L Final     Chloride   Date Value Ref Range Status   07/31/2020 106 94 - 109 mmol/L Final   10/31/2019 107 94 - 109 mmol/L Final     Carbon Dioxide   Date Value Ref Range Status   07/31/2020 25 20 - 32 mmol/L Final   10/31/2019 27 20 - 32 mmol/L Final     Anion Gap   Date Value Ref Range Status   07/31/2020 5 3 - 14 mmol/L Final   10/31/2019 5 3 - 14 mmol/L Final     Urea Nitrogen   Date Value Ref Range Status   07/31/2020 15 7 - 30 mg/dL Final   10/31/2019 12 7 - 30 mg/dL Final     Creatinine   Date Value Ref Range Status   07/31/2020 0.90 0.52 - 1.04 mg/dL Final   10/31/2019 0.87 0.52 - 1.04 mg/dL Final     GFR Estimate   Date Value Ref Range Status   07/31/2020 82 >60 mL/min/[1.73_m2] Final     Comment:     Non  GFR Calc  Starting 12/18/2018, serum creatinine based estimated GFR (eGFR) will be   calculated using the Chronic Kidney Disease Epidemiology Collaboration   (CKD-EPI) equation.     10/31/2019 86 >60 mL/min/[1.73_m2] Final     Comment:     Non  GFR Calc  Starting 12/18/2018, serum creatinine based estimated GFR (eGFR) will be   calculated using the Chronic Kidney Disease Epidemiology Collaboration   (CKD-EPI) equation.       Glucose   Date Value Ref Range Status   07/31/2020 108 (H) 70 - 99 mg/dL Final   10/31/2019 109 (H) 70 - 99 mg/dL Final     Hemoglobin A1C   Date Value Ref Range Status   07/31/2020 6.3 (H) 0 - 5.6 % Final     Comment:      Normal <5.7% Prediabetes 5.7-6.4%  Diabetes 6.5% or higher - adopted from ADA   consensus guidelines.       Calcium   Date Value Ref Range Status   07/31/2020 8.3 (L) 8.5 - 10.1 mg/dL Final   10/31/2019 8.5 8.5 - 10.1 mg/dL Final       Inflammatory Markers No results found for: CRP    Hepatic Studies    Bilirubin Total   Date Value Ref Range Status   07/31/2020 0.4 0.2 - 1.3 mg/dL Final   10/31/2019 0.3 0.2 - 1.3 mg/dL Final     Alkaline Phosphatase   Date Value Ref Range Status   07/31/2020 60 40 - 150 U/L Final   10/31/2019 64 40 - 150 U/L Final     Albumin   Date Value Ref Range Status   07/31/2020 3.2 (L) 3.4 - 5.0 g/dL Final   10/31/2019 3.2 (L) 3.4 - 5.0 g/dL Final     AST   Date Value Ref Range Status   07/31/2020 16 0 - 45 U/L Final   10/31/2019 20 0 - 45 U/L Final     ALT   Date Value Ref Range Status   07/31/2020 17 0 - 50 U/L Final   10/31/2019 17 0 - 50 U/L Final       Hematology Studies      WBC   Date Value Ref Range Status   07/31/2020 3.0 (L) 4.0 - 11.0 10e9/L Final   10/31/2019 2.7 (L) 4.0 - 11.0 10e9/L Final     Absolute Neutrophil   Date Value Ref Range Status   07/31/2020 1.2 (L) 1.6 - 8.3 10e9/L Final   10/31/2019 1.0 (L) 1.6 - 8.3 10e9/L Final     Absolute Lymphocytes   Date Value Ref Range Status   07/31/2020 1.4 0.8 - 5.3 10e9/L Final   10/31/2019 1.3 0.8 - 5.3 10e9/L Final     Absolute Monocytes   Date Value Ref Range Status   07/31/2020 0.3 0.0 - 1.3 10e9/L Final   10/31/2019 0.3 0.0 - 1.3 10e9/L Final     Absolute Eosinophils   Date Value Ref Range Status   07/31/2020 0.1 0.0 - 0.7 10e9/L Final   10/31/2019 0.1 0.0 - 0.7 10e9/L Final     Hemoglobin   Date Value Ref Range Status   07/31/2020 12.3 11.7 - 15.7 g/dL Final   10/31/2019 11.7 11.7 - 15.7 g/dL Final     Hematocrit   Date Value Ref Range Status   07/31/2020 38.5 35.0 - 47.0 % Final   10/31/2019 37.1 35.0 - 47.0 % Final     Platelet Count   Date Value Ref Range Status   07/31/2020 202 150 - 450 10e9/L Final   10/31/2019 208 150  - 450 10e9/L Final             Momo Wang MD

## 2020-12-10 ENCOUNTER — TELEPHONE (OUTPATIENT)
Dept: PHARMACY | Facility: CLINIC | Age: 35
End: 2020-12-10

## 2020-12-10 LAB
CD3 CELLS # BLD: 1217 CELLS/UL (ref 603–2990)
CD3 CELLS NFR BLD: 84 % (ref 49–84)
CD3+CD4+ CELLS # BLD: 27 CELLS/UL (ref 441–2156)
CD3+CD4+ CELLS NFR BLD: 2 % (ref 28–63)
CD3+CD4+ CELLS/CD3+CD8+ CLL BLD: 0.02 % (ref 1.4–2.6)
CD3+CD8+ CELLS # BLD: 1171 CELLS/UL (ref 125–1312)
CD3+CD8+ CELLS NFR BLD: 81 % (ref 10–40)
IFC SPECIMEN: ABNORMAL

## 2020-12-10 NOTE — TELEPHONE ENCOUNTER
Pt came to the pharmacy after clinic appointment  Patient will   6 prescriptions on 12/09/20      Last Filled on:  11/11/20  Follow-up Date: 01/07/21    Marlin Wise CPhT  [facility]  [phone]

## 2020-12-11 LAB
HIV1 RNA # PLAS NAA DL=20: ABNORMAL {COPIES}/ML
HIV1 RNA SERPL NAA+PROBE-LOG#: 4.2 {LOG_COPIES}/ML

## 2020-12-17 ENCOUNTER — TELEPHONE (OUTPATIENT)
Dept: PHARMACY | Facility: CLINIC | Age: 35
End: 2020-12-17

## 2020-12-17 ENCOUNTER — TELEPHONE (OUTPATIENT)
Dept: INFECTIOUS DISEASES | Facility: CLINIC | Age: 35
End: 2020-12-17

## 2020-12-17 NOTE — TELEPHONE ENCOUNTER
Called pt to check-in, no answer, LMOM.    Shauna Mcclelland, PharmD, BCACP  10:58 AM 12/17/20

## 2020-12-23 ENCOUNTER — VIRTUAL VISIT (OUTPATIENT)
Dept: PHARMACY | Facility: CLINIC | Age: 35
End: 2020-12-23
Payer: COMMERCIAL

## 2020-12-23 DIAGNOSIS — R11.0 NAUSEA: ICD-10-CM

## 2020-12-23 DIAGNOSIS — B37.0 THRUSH: ICD-10-CM

## 2020-12-23 DIAGNOSIS — B59 PNEUMONIA DUE TO PNEUMOCYSTIS JIROVECII, UNSPECIFIED LATERALITY, UNSPECIFIED PART OF LUNG (H): ICD-10-CM

## 2020-12-23 DIAGNOSIS — B20 HUMAN IMMUNODEFICIENCY VIRUS (HIV) DISEASE (H): Primary | ICD-10-CM

## 2020-12-23 DIAGNOSIS — R52 PAIN: ICD-10-CM

## 2020-12-23 DIAGNOSIS — B59 PCP (PNEUMOCYSTIS CARINII PNEUMONIA) (H): ICD-10-CM

## 2020-12-23 PROCEDURE — 99607 MTMS BY PHARM ADDL 15 MIN: CPT | Mod: TEL | Performed by: PHARMACIST

## 2020-12-23 PROCEDURE — 99605 MTMS BY PHARM NP 15 MIN: CPT | Mod: TEL | Performed by: PHARMACIST

## 2020-12-23 NOTE — Clinical Note
Hi Dr. Wang,    Just wanted to send along an update that Bella was in the hospital recently for PCP pneumonia - she has follow-up visit today at [facility] and I have also asked her to schedule follow-up with you.  On a good note, she is now taking the Biktarvy consistently and really feels like she can tolerate the regimen!    Thanks,  Shauna

## 2020-12-23 NOTE — TELEPHONE ENCOUNTER
Spoke with pt - see MTM visit note dated 12/23/20.    Shauna Mcclelland, PharmD, BCACP  3:38 PM 12/23/20

## 2020-12-23 NOTE — PROGRESS NOTES
MTM ENCOUNTER  SUBJECTIVE/OBJECTIVE:                           Bella Fung is a 35 year old female called for a follow-up visit. She was referred to me from Dr. Wang.      Allergies/ADRs: Reviewed in chart  Tobacco: She reports that she has been smoking other. She has been smoking about 0.00 packs per day. She has never used smokeless tobacco.  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      Reason for visit: Review how she is doing with medication switch. During visit on 12/9/2020 with Dr. Wang started Biktarvy.  Unfortunately she has become ill and was admitted to [facility] for PJP pneumonia for which she is now taking treatment dosing with Bactrim.  She was also started on fluconazole for thrush.  She was discharged on 12/18/2020.  She has follow-up at [facility] scheduled for 12/29/20.      PCP Pneumonia  Bactrim /160 - 1 tablet thrice daily x 21 days    Reports she is feeling well, cough has improved.    Denies fever, sore throat, chills, night sweats, shortness of breath or trouble breathing.       Thrush  Fluconazole 200 mg - 1 tablet daily x 10 days    HIV - dx March 2011  Current medications: Biktarvy  MAC ppx: Azithromycin discontinued during hospitalization     Hx of medication non-adherence due to issues with tolerability.  Currently taking Biktarvy consistently - likes that it is a single tablet regimen and that the pill size relatively small.  She reports she is tolerating the Biktarvy with no concerns.  Has some stomach upset and nausea, but this has remained unchanged since starting the Biktarvy. No diarrhea or constipation, denies headaches.      Past medications:   Tivicay + Descovy -   Prezcobix (darunavir/cobicistat) + Descovy - switched 10/2017 due to nausea, diarrhea   Prezcobix (darunavir/cobicistat) + Truvada - switched 1/2017   Darunavir/ritonavir + Truvada  - switched 10/2015  Atazanavir/ritonavir + Truvada (started 2012) - switched 8/2013 due to loose stools    Mutations: none known  HLA B 5710: negative    Nausea  Ondansetron 4 mg - as needed, reports she has used 4-5 times since rx'ed    Effective when she does need to use     Pain  - back pain  Oxycodone//acetaminophen 5/325 mg - 1 tablet daily  Tramadol 50 mg - 1 tablet nightly  Methocarbamol 750 mg - as needed, maybe uses one every month     Takes oxycodone/acetaminophen when she is off work (works night shift) b/c it makes her too tired if she takes when she is at work.  She will normally take tramadol before/during her work shift.      Other  Omeprazole 20 mg - no longer taking, reports cough has improved with treatment of pneumonia     Lab Results   Component Value Date    HIV-1 RNA Quant Result 14,939 12/09/2020    HIV RNA QT Log 4.2 12/09/2020     HIV Latest Ref Rng & Units 12/9/2020   CD3, Mature T 49 - 84 % 84   CD4, [place] T 28 - 63 % 2 (L)   CD8, Suppressor T 10 - 40 % 81 (H)   CD4/CD8 Ratio 1.40 - 2.60 0.02 (L)   Absolute CD4 441 - 2,156 cells/uL 27 (L)     Lab Results   Component Value Date    CR 0.80 12/09/2020     CBC RESULTS:   Recent Labs   Lab Test 12/09/20  1751   WBC 3.7*   RBC 4.43   HGB 12.6   HCT 39.7   MCV 90   MCH 28.4   MCHC 31.7   RDW 13.4         Liver Function Studies -   Recent Labs   Lab Test 12/09/20  1751   PROTTOTAL 7.3   ALBUMIN 3.2*   BILITOTAL 0.3   ALKPHOS 62   AST 20   ALT 22      Medication Adherence/Access: Patient takes medications directly from bottles.    Today's Vitals: There were no vitals taken for this visit.      ASSESSMENT:                          PCP Pneumonia: improving, pt finishing her 21 day course of bactrim DS thrice daily     Thrush: improving, pt finishing 10 day supply of fluconazole   Fluconazole 200 mg - 1 tablet daily x 10 days    HIV: so far doing well taking her Biktarvy consistently, she tolerates  This medication and prefers the smaller  tablet size.      Nausea: stable, ondansetron effective when needed    Pain: stable    Medication Adherence: has struggled with this in the past, optimistic that she will be able to stay adherent to Biktarvy.      PLAN:                          Post Discharge Medication Reconciliation Status: discharge medications reconciled, continue medications without change.    PCP Pneumonia  - Has follow-up scheduled at [facility]    - Reviewed that she will need to go back to ppx dosing after treatment dosing until CD4 recovers    HIV  - Due for follow-up visit with Dr. Wang, will send message to clinic coordinator    - Recommend updating lab work to monitor trends after being on Biktarvy for 2-8 weeks     I spent 28 minutes with this patient today. I offer these suggestions for consideration by ID Team. A copy of the visit note was provided to the patient's referring provider.    Will follow up in 2-4 weeks.    The patient was sent via [employer] a summary of these recommendations.       Patient consented to a telehealth visit: yes  Telemedicine Visit Details  Type of service:  Telephone visit  Start Time: 2:43PM   End Time: 3:11 PM  Originating Location (patient location): Home  Distant Location (provider location):  [facility] AND INFECTIOUS DISEASES [facility]  Mode of Communication:  Telephone     Medication Therapy Recommendations  AIDS due to HIV-I (H)    Current Medication: bictegravir-emtricitabine-tenofovir (BIKTARVY) -25 MG per tablet   Rationale: Patient forgets to take - Adherence - Adherence   Recommendation: Provide Adherence Intervention - Biktarvy -25 MG Tabs - Encouraged pt to take Biktarvy regularly   Status: Accepted - no CPA Needed         PCP (pneumocystis carinii pneumonia) (H)    Current Medication: sulfamethoxazole-trimethoprim (BACTRIM) 400-80 MG tablet   Rationale: Patient forgets to take - Adherence - Adherence   Recommendation: Provide Adherence Intervention -  Bactrim 400-80 MG Tabs - Reviewed that she will need secondary ppx after treatment dosing   Status: Accepted - no CPA Needed

## 2020-12-29 NOTE — PATIENT INSTRUCTIONS
Recommendations from today's MTM visit:                                                    Thank your for talking with me on 12/23/20.      1) Keep up the excellent job taking your medications!    2) Schedule a follow-up visit with Dr. Wang      To schedule another MTM appointment, please call the clinic directly or you may call the MTM scheduling line at [phone] or toll-free at [phone].     I value your experience and would be very thankful for your time with providing feedback on our clinic survey. You may receive a survey via email or text message in the next few days.     Please feel free to contact me with any questions or concerns you have.      Take care!  Shauna Mcclelland, PharmD, BCACP

## 2021-01-05 ENCOUNTER — TELEPHONE (OUTPATIENT)
Dept: PHARMACY | Facility: CLINIC | Age: 36
End: 2021-01-05

## 2021-01-05 NOTE — TELEPHONE ENCOUNTER
"Called to check-in with patient - she is doing well.  Is out shopping but briefly reports that her medications \"haven't changed\" although we were not able to verify dose, frequency, etc.  She states she is taking medications consistently with no missed doses.  Reports she did go to follow-up visit with [facility] on 12/29/20 although I am unable to see a record of this visit in Care Everywhere.  She is due for a follow-up visit with Dr. Wang.    CC clinic coordinator - please reach out to pt and help schedule follow-up visit.    Many thanks!  Shauna Mcclelland, PharmD, BCACP  3:08 PM 01/05/21      "

## 2021-01-06 LAB — LAB SCANNED RESULT: NORMAL

## 2021-01-07 ENCOUNTER — TELEPHONE (OUTPATIENT)
Dept: PHARMACY | Facility: CLINIC | Age: 36
End: 2021-01-07

## 2021-01-07 NOTE — TELEPHONE ENCOUNTER
Attempted to contact the patient to for refill reminder call,  left message on voicemail    Last filled on: 12/09/20    Follow-up Date: 01/13/21 2nd attempt    Marlin Wise CPhT  [facility]  [phone]

## 2021-01-11 ENCOUNTER — TELEPHONE (OUTPATIENT)
Dept: ANESTHESIOLOGY | Facility: CLINIC | Age: 36
End: 2021-01-11

## 2021-01-11 ENCOUNTER — MYC REFILL (OUTPATIENT)
Dept: ANESTHESIOLOGY | Facility: CLINIC | Age: 36
End: 2021-01-11

## 2021-01-11 DIAGNOSIS — B20 HUMAN IMMUNODEFICIENCY VIRUS (HIV) DISEASE (H): ICD-10-CM

## 2021-01-11 DIAGNOSIS — G89.4 CHRONIC PAIN SYNDROME: ICD-10-CM

## 2021-01-11 RX ORDER — TRAMADOL HYDROCHLORIDE 50 MG/1
50 TABLET ORAL 3 TIMES DAILY
Qty: 90 TABLET | Refills: 0 | Status: SHIPPED | OUTPATIENT
Start: 2021-01-11 | End: 2021-02-10

## 2021-01-11 RX ORDER — OXYCODONE AND ACETAMINOPHEN 5; 325 MG/1; MG/1
1-2 TABLET ORAL EVERY 6 HOURS PRN
Qty: 35 TABLET | Refills: 0 | Status: SHIPPED | OUTPATIENT
Start: 2021-01-11 | End: 2021-02-10

## 2021-01-11 NOTE — TELEPHONE ENCOUNTER
[facility] Call Center    Phone Message    May a detailed message be left on voicemail: yes     Reason for Call: Medication Refill Request    Has the patient contacted the pharmacy for the refill? Yes   Name of medication being requested: oxyCODONE-acetaminophen (PERCOCET) 5-325 MG tablet, and traMADol (ULTRAM) 50 MG tablet  Provider who prescribed the medication: Dr. Rosenbaum  [place] [facility] [place]  Date medication is needed: 1/11/21   Pt is out and is needing this medication today      Action Taken: Message routed to:  [facility] ([facility] pain

## 2021-01-11 NOTE — TELEPHONE ENCOUNTER
Refill request    Medication: traMADol (ULTRAM) 50 MG tablet      MNPMP Checked: Yes    Last refilled 11/11/20 for 90 tablets      Last clinic appointment:09/17/20   Next clinic appointment: Will reach out to pt to schedule f/u      Preferred pharmacy:    [address]      Refill request    Medication: oxyCODONE-acetaminophen (PERCOCET) 5-325 MG tablet      MNPMP Checked: Yes    Last refilled 11/11/20 for 35 tablets    Last clinic appointment:09/17/20   Next clinic appointment: Will reach out to pt to schedule f/u      Preferred pharmacy:    [address]

## 2021-01-13 NOTE — TELEPHONE ENCOUNTER
Called patient for refill reminder. She picked up Biktarvy when she discharged from [facility].     Follow-up Date: 02/10/21    Marlin Wise CPhT  [facility]  [phone]

## 2021-01-14 ENCOUNTER — HEALTH MAINTENANCE LETTER (OUTPATIENT)
Age: 36
End: 2021-01-14

## 2021-02-09 ENCOUNTER — TELEPHONE (OUTPATIENT)
Dept: ANESTHESIOLOGY | Facility: CLINIC | Age: 36
End: 2021-02-09

## 2021-02-09 DIAGNOSIS — G89.4 CHRONIC PAIN SYNDROME: ICD-10-CM

## 2021-02-09 DIAGNOSIS — B20 HUMAN IMMUNODEFICIENCY VIRUS (HIV) DISEASE (H): ICD-10-CM

## 2021-02-09 NOTE — TELEPHONE ENCOUNTER
Refill request    Medication: oxyCODONE-acetaminophen (PERCOCET) 5-325 MG tablet      MNPMP Checked: Yes    Last refilled 01/11/21 for 35 tablets      Last clinic appointment: 02/05/21  Next clinic appointment: 02/25/21      Preferred pharmacy:    [address] 9[address]      Refill request    Medication: traMADol (ULTRAM) 50 MG tablet      MNPMP Checked: Yes    Last refilled 01/11/21 for 90 tablets    Last clinic appointment: 02/05/21  Next clinic appointment: 02/25/21      Preferred pharmacy:    [address]

## 2021-02-10 ENCOUNTER — TELEPHONE (OUTPATIENT)
Dept: PHARMACY | Facility: CLINIC | Age: 36
End: 2021-02-10

## 2021-02-10 RX ORDER — TRAMADOL HYDROCHLORIDE 50 MG/1
50 TABLET ORAL 3 TIMES DAILY
Qty: 90 TABLET | Refills: 0 | Status: SHIPPED | OUTPATIENT
Start: 2021-02-10 | End: 2021-03-16

## 2021-02-10 RX ORDER — OXYCODONE AND ACETAMINOPHEN 5; 325 MG/1; MG/1
1-2 TABLET ORAL EVERY 6 HOURS PRN
Qty: 35 TABLET | Refills: 0 | Status: SHIPPED | OUTPATIENT
Start: 2021-02-10 | End: 2021-03-16

## 2021-02-10 NOTE — TELEPHONE ENCOUNTER
Called patient for refill reminder.    Patient will   5 prescriptions on 02/10/21      Last Filled on: 01/11/21   Follow-up Date: 03/11/21    Marlin Wise CPhT  [facility]  [phone]

## 2021-02-17 ENCOUNTER — TELEPHONE (OUTPATIENT)
Dept: PHARMACY | Facility: CLINIC | Age: 36
End: 2021-02-17

## 2021-02-18 ENCOUNTER — VIRTUAL VISIT (OUTPATIENT)
Dept: PHARMACY | Facility: CLINIC | Age: 36
End: 2021-02-18
Payer: COMMERCIAL

## 2021-02-18 DIAGNOSIS — B20 AIDS DUE TO HIV-I (H): Primary | ICD-10-CM

## 2021-02-18 PROCEDURE — 99607 MTMS BY PHARM ADDL 15 MIN: CPT | Performed by: PHARMACIST

## 2021-02-18 PROCEDURE — 99605 MTMS BY PHARM NP 15 MIN: CPT | Mod: TEL | Performed by: PHARMACIST

## 2021-02-18 NOTE — TELEPHONE ENCOUNTER
Spoke with pt - see MTM visit dated 02/18/21.    Shauna Mcclelland, PharmD, BCACP  12:03 PM 02/18/21

## 2021-02-18 NOTE — PROGRESS NOTES
Medication Therapy Management (MTM) Encounter    ASSESSMENT:                            HIV/OI ppx: Tolerating Biktarvy and has been taking consistently, reports she has also been adherent to her OI prophylaxis.  Outside viral load resulted at 61 copies on 12/29/2020, down from about 15,000 earlier in December.  Her CD4 cough remains low at 27.  She is due for a follow-up visit with Dr. Wang.      Medication Adherence/Access: No issues identified.  Historically has struggled with adherence to medication, however reports that she is currently taking her Biktarvy with no concerns.      PLAN:                            HIV  - Will ask clinic coordinator to call pt to schedule follow-up visit with Dr. Wang  - Encouraged pt to continue with her excellent medication adherence     Follow-up: 3 months    Shauna Mcclelland, PharmD, BCACP    SUBJECTIVE/OBJECTIVE:                          Bella Fung is a 36 year old female called for a follow-up visit. She was referred to me from Dr. Wang/ID Team.  Today's visit is a follow-up MTM visit from 12/23/2020.    Allergies/ADRs: Reviewed in chart  Tobacco: She reports that she has been smoking other. She has been smoking about 0.00 packs per day. She has never used smokeless tobacco.  Alcohol: Less than 1 beverages / week    Reason for visit: Medication Check-In    Overall pt reports she is doing well - she has been taking her Biktarvy consistently without missed doses and has been tolerating well.  She had been following with ID at [facility] as her  was there during an inpatient stay, however it sounds like ultimately she would like to continue following with Dr. Wang.  Her viral load has improved and on 12/29/2020 it was 61 copies.  Pt denies fever, sore throat, cough, chills, night sweats, thrush, stomach upset, nausea, vomiting, trouble swallowing, constipation, diarrhea, headache.      HIV - dx March 2011  Current medications: Biktarvy     Hx of medication  non-adherence due to issues with tolerability.  Currently taking Biktarvy consistently - likes that it is a single tablet regimen and that the pill size relatively small.  She reports she is tolerating the Biktarvy with no concerns.  Has some stomach upset and nausea, but this has remained unchanged since starting the Biktarvy. No diarrhea or constipation, denies headaches.       Past medications:   Tivicay + Descovy - switched for simplification   Prezcobix (darunavir/cobicistat) + Descovy - switched 10/2017 due to nausea, diarrhea   Prezcobix (darunavir/cobicistat) + Truvada - switched 1/2017   Darunavir/ritonavir + Truvada - switched 10/2015  Atazanavir/ritonavir + Truvada (started 2012) - switched 8/2013 due to loose stools     Mutations (reviewed 2/23/21):  NNRTI: K101E, K103N/S, E138K, K238T  HLA B 5710: negative     OI prophylaxis  Bactrim (Sulfamethoxazole/Trimethoprim) 400/80 -1 tablet daily   Azithromycin 600 mg - 2 tablets weekly     Today's Vitals: There were no vitals taken for this visit.     Lab Results   Component Value Date    HIV-1 RNA Quant Result 14,939 12/09/2020    HIV RNA QT Log 4.2 12/09/2020     HIV Latest Ref Rng & Units 12/9/2020   CD3, Mature T 49 - 84 % 84   CD4, [place] T 28 - 63 % 2 (L)   CD8, Suppressor T 10 - 40 % 81 (H)   CD4/CD8 Ratio 1.40 - 2.60 0.02 (L)   Absolute CD4 441 - 2,156 cells/uL 27 (L)     Lab Results   Component Value Date    CR 0.80 12/09/2020      Recent Labs   Lab Test 12/09/20  1751   WBC 3.7*   RBC 4.43   HGB 12.6   HCT 39.7   MCV 90   MCH 28.4   MCHC 31.7   RDW 13.4         Recent Labs   Lab Test 12/09/20  1751   PROTTOTAL 7.3   ALBUMIN 3.2*   BILITOTAL 0.3   ALKPHOS 62   AST 20   ALT 22      ----------------    I spent 5 minutes with this patient today. I offer these suggestions for consideration by ID Team. A copy of the visit note was provided to the patient's referring provider.  The patient was sent via [employer] a summary of these recommendations.      Telemedicine Visit Details  Type of service:  Telephone visit  Start Time: 11:53 AM  End Time: 11:58 AM  Originating Location (patient location): Home  Distant Location (provider location):  [facility]EAS[facility]

## 2021-02-18 NOTE — Clinical Note
Roxana Wang,    Bella continues to do well - she is taking her Biktarvy and OI ppx consistently!      I've asked Bella to follow-up with you -- she did mention that she can come in this week for labs -- didn't know if you wanted to standard  HIV order set or just check viral load +/- T cell subset.  She was seen by ID provider at [facility] but sounds like she would like to stay with us, she was just there b/c her  has been getting treatment at [facility].  When her viral load was last checked at the end of Dec it had improved to 61!    Thanks,  Shauna

## 2021-02-23 NOTE — PATIENT INSTRUCTIONS
Recommendations from today's MTM visit:                                                      1) Keep up the excellent job taking your medications!    2) I will have the clinic reach out to help you schedule a follow-up visit.    Next MTM visit: I will call you again in 3 months or sooner to check-in    To schedule another MTM appointment, please call the clinic directly or you may call the MTM scheduling line at [phone] or toll-free at [phone].     I value your experience and would be very thankful for your time with providing feedback on our clinic survey. You may receive a survey via email or text message in the next few days.     Please feel free to contact me with any questions or concerns you have.      Take care!  Shauna Mcclelland, PharmD, BCACP

## 2021-03-14 ENCOUNTER — HEALTH MAINTENANCE LETTER (OUTPATIENT)
Age: 36
End: 2021-03-14

## 2021-03-16 ENCOUNTER — TELEPHONE (OUTPATIENT)
Dept: ANESTHESIOLOGY | Facility: CLINIC | Age: 36
End: 2021-03-16

## 2021-03-16 DIAGNOSIS — B20 HUMAN IMMUNODEFICIENCY VIRUS (HIV) DISEASE (H): ICD-10-CM

## 2021-03-16 DIAGNOSIS — G89.4 CHRONIC PAIN SYNDROME: ICD-10-CM

## 2021-03-16 RX ORDER — TRAMADOL HYDROCHLORIDE 50 MG/1
50 TABLET ORAL 3 TIMES DAILY
Qty: 90 TABLET | Refills: 0 | Status: SHIPPED | OUTPATIENT
Start: 2021-03-16 | End: 2021-04-24

## 2021-03-16 RX ORDER — OXYCODONE AND ACETAMINOPHEN 5; 325 MG/1; MG/1
1-2 TABLET ORAL EVERY 6 HOURS PRN
Qty: 35 TABLET | Refills: 0 | Status: SHIPPED | OUTPATIENT
Start: 2021-03-16 | End: 2021-04-24

## 2021-03-16 NOTE — TELEPHONE ENCOUNTER
Refill request    Medication: oxyCODONE-acetaminophen (PERCOCET) 5-325 MG tablet      MNPMP Checked: Yes    Last refilled 02/12/21 for 35 tablets      Last clinic appointment: 02/05/21  Next clinic appointment: Will reach out to pt to schedule f/u      Preferred pharmacy:    [address] 3[address]      Refill request    Medication: traMADol (ULTRAM) 50 MG tablet      MNPMP Checked: Yes    Last refilled 02/12/21 for 90 tablets      Last clinic appointment: 02/05/21  Next clinic appointment: Will reach out to pt to schedule f/u      Preferred pharmacy:    [phone]

## 2021-03-16 NOTE — TELEPHONE ENCOUNTER
[facility] Call Center    Phone Message    May a detailed message be left on voicemail: yes     Reason for Call: Appointment Intake    Referring Provider Name: Dr. Quiñones  Diagnosis and/or Symptoms: In person Follow-up appointment. Schedule is not pulling up. Please call patient to help schedule an appointment. Thank you.       Action Taken: Message routed to:  [facility] Pain    Travel Screening: Not Applicable

## 2021-03-17 ENCOUNTER — TELEPHONE (OUTPATIENT)
Dept: INFECTIOUS DISEASES | Facility: CLINIC | Age: 36
End: 2021-03-17

## 2021-03-17 DIAGNOSIS — B20 HUMAN IMMUNODEFICIENCY VIRUS (HIV) DISEASE (H): ICD-10-CM

## 2021-03-19 ENCOUNTER — TELEPHONE (OUTPATIENT)
Dept: PHARMACY | Facility: CLINIC | Age: 36
End: 2021-03-19

## 2021-03-19 NOTE — TELEPHONE ENCOUNTER
Called to check-in with patient - she reports that she has been taking her Bikatrvy consistently and plans on coming in today to pick-up rx.  She will also update lab work when she is here.    Shauna Mcclelland, PharmD, BCACP  11:54 AM 03/19/21

## 2021-03-31 ASSESSMENT — ANXIETY QUESTIONNAIRES
GAD7 TOTAL SCORE: 0
5. BEING SO RESTLESS THAT IT IS HARD TO SIT STILL: NOT AT ALL
GAD7 TOTAL SCORE: 0
7. FEELING AFRAID AS IF SOMETHING AWFUL MIGHT HAPPEN: NOT AT ALL
7. FEELING AFRAID AS IF SOMETHING AWFUL MIGHT HAPPEN: NOT AT ALL
4. TROUBLE RELAXING: NOT AT ALL
1. FEELING NERVOUS, ANXIOUS, OR ON EDGE: NOT AT ALL
2. NOT BEING ABLE TO STOP OR CONTROL WORRYING: NOT AT ALL
3. WORRYING TOO MUCH ABOUT DIFFERENT THINGS: NOT AT ALL
6. BECOMING EASILY ANNOYED OR IRRITABLE: NOT AT ALL

## 2021-04-01 ENCOUNTER — OFFICE VISIT (OUTPATIENT)
Dept: ANESTHESIOLOGY | Facility: CLINIC | Age: 36
End: 2021-04-01
Payer: COMMERCIAL

## 2021-04-01 VITALS
BODY MASS INDEX: 43.87 KG/M2 | RESPIRATION RATE: 16 BRPM | SYSTOLIC BLOOD PRESSURE: 138 MMHG | HEART RATE: 54 BPM | DIASTOLIC BLOOD PRESSURE: 86 MMHG | WEIGHT: 257 LBS | HEIGHT: 64 IN

## 2021-04-01 DIAGNOSIS — Z79.891 OPIOID CONTRACT EXISTS: ICD-10-CM

## 2021-04-01 DIAGNOSIS — M79.18 MYOFASCIAL PAIN: Primary | ICD-10-CM

## 2021-04-01 DIAGNOSIS — B20 AIDS DUE TO HIV-I (H): ICD-10-CM

## 2021-04-01 PROCEDURE — 99000 SPECIMEN HANDLING OFFICE-LAB: CPT | Performed by: PATHOLOGY

## 2021-04-01 PROCEDURE — 87536 HIV-1 QUANT&REVRSE TRNSCRPJ: CPT | Mod: 90 | Performed by: PATHOLOGY

## 2021-04-01 PROCEDURE — 36415 COLL VENOUS BLD VENIPUNCTURE: CPT | Performed by: PATHOLOGY

## 2021-04-01 PROCEDURE — 99213 OFFICE O/P EST LOW 20 MIN: CPT | Performed by: PATHOLOGY

## 2021-04-01 PROCEDURE — 80307 DRUG TEST PRSMV CHEM ANLYZR: CPT | Mod: 90 | Performed by: PATHOLOGY

## 2021-04-01 PROCEDURE — 80349 CANNABINOIDS NATURAL: CPT | Mod: 90 | Performed by: PATHOLOGY

## 2021-04-01 RX ORDER — IBUPROFEN 600 MG/1
600 TABLET, FILM COATED ORAL EVERY 6 HOURS PRN
Qty: 90 TABLET | Refills: 0 | Status: SHIPPED | OUTPATIENT
Start: 2021-04-01 | End: 2021-05-01

## 2021-04-01 RX ORDER — OXYCODONE HYDROCHLORIDE 5 MG/1
5 TABLET ORAL 3 TIMES DAILY PRN
Qty: 15 TABLET | Refills: 0 | Status: SHIPPED | OUTPATIENT
Start: 2021-04-01 | End: 2021-04-06

## 2021-04-01 RX ORDER — CYCLOBENZAPRINE HCL 10 MG
10 TABLET ORAL 3 TIMES DAILY PRN
Qty: 60 TABLET | Refills: 0 | Status: SHIPPED | OUTPATIENT
Start: 2021-04-01 | End: 2021-05-01

## 2021-04-01 ASSESSMENT — PAIN SCALES - GENERAL: PAINLEVEL: EXTREME PAIN (8)

## 2021-04-01 ASSESSMENT — MIFFLIN-ST. JEOR: SCORE: 1830.74

## 2021-04-01 ASSESSMENT — ANXIETY QUESTIONNAIRES: GAD7 TOTAL SCORE: 0
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
## 2021-04-01 NOTE — PATIENT INSTRUCTIONS
Medications:    cyclobenzaprine (FLEXERIL) 10 MG tablet.  Take 1 tablet (10 mg) by mouth 3 times daily as needed for muscle spasms,    ibuprofen (ADVIL/MOTRIN) 600 MG tablet. Take 1 tablet (600 mg) by mouth every 6 hours as needed for moderate pain    oxyCODONE (ROXICODONE) 5 MG tablet. Take 1 tablet (5 mg) by mouth 3 times daily as needed for severe pain        *For refills of opioid medications - You MUST call (Or MyChart) the clinic DIRECTLY and at least 7 days before you run out of your medication.      Referrals:    Physical Therapy Referral placed-   If you have not heard from the scheduling office within 2 business days, please call [phone] for all locations      Treatment planning:    Controlled substance agreement was signed today between you and Dr. Rosenbaum. A copy was provided to you for your records.     Urine drug screen was collected today. These will be repeated randomly.       Recommended Follow up:      Follow up in 2-3 months in clinic with Dr. Rosenbaum         Please call [phone], option #1 to schedule your clinic appointment if you don't already have an appointment scheduled.        To speak with a nurse, schedule/reschedule/cancel a clinic appointment, or request a medication refill call: [phone], option #1.    You can also reach us by Using[employer]: [email or web address]

## 2021-04-01 NOTE — PROGRESS NOTES
AVS reviewed with patient. Patient declined copy of AVS but copy of CSA provided. Drug screen changed to blood and lab appointment was made for today. Patient states they last took their medication this am.  Ashlyn Rodriguez LPN
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
## 2021-04-02 ENCOUNTER — TELEPHONE (OUTPATIENT)
Dept: PHARMACY | Facility: CLINIC | Age: 36
End: 2021-04-02

## 2021-04-02 LAB
HIV1 RNA # PLAS NAA DL=20: 94 {COPIES}/ML
HIV1 RNA SERPL NAA+PROBE-LOG#: 2 {LOG_COPIES}/ML

## 2021-04-02 NOTE — TELEPHONE ENCOUNTER
Called to let pt know that her viral load is 94 (slightly up from 61 when checked 12/292020).  Patient reports she did miss a couple doses this past week as she was in [facility], however in general she tends to miss a few doses per week b/c she works overnights and may forget after her shift.  She has a pill box where each day of the week peels off so she can take it with her if needed, the pill box also helps her recognize if she has missed any doses.  She agrees to be more diligent in working on adherence, however no specific strategies were discussed today.    Recommend rechecking viral load in 4-8 weeks - will discuss with Dr. Kathleen Mcclelland, PharmD, BCACP  3:18 PM 04/02/21

## 2021-04-08 DIAGNOSIS — B20 HUMAN IMMUNODEFICIENCY VIRUS (HIV) DISEASE (H): ICD-10-CM

## 2021-04-09 ENCOUNTER — TELEPHONE (OUTPATIENT)
Dept: PHARMACY | Facility: CLINIC | Age: 36
End: 2021-04-09

## 2021-04-09 NOTE — TELEPHONE ENCOUNTER
Called patient for refill reminder.  It looks like she had her Biktarvy and Bactrim filled at another pharmacy on 03/25/21    30 day supply       1 month of on time refill.    Last Filled on: 03/25/21   Follow-up Date: 04/21/21      Marlin Wise CPhT  [facility]  [phone]

## 2021-04-13 LAB
11OH-THC SERPL-MCNC: NEGATIVE NG/ML
AMPHETAMINES SPEC-MCNC: NEGATIVE NG/ML
APAP BLD-MCNC: NEGATIVE UG/ML
BARBITURATES SPEC-MCNC: NEGATIVE UG/ML
BENZODIAZ SPEC-MCNC: NEGATIVE NG/ML
BUPRENORPHINE SERPLBLD-MCNC: NEGATIVE NG/ML
CANNABIDIOL: NEGATIVE NG/ML
CANNABINOIDS BLD CFM-MCNC: 1.5 NG/ML
CANNABINOIDS SERPL QL CFM: POSITIVE
CANNABINOL: NEGATIVE NG/ML
CARBOXYTHC BLD-MCNC: POSITIVE NG/ML
CARBOXYTHC UR QL: 14.5 NG/ML
CARISOPRODOL IA: NEGATIVE UG/ML
COCAINE METABOLITE IA: NEGATIVE NG/ML
DECLARED MEDICATIONS: ABNORMAL
ETHANOL BLD-MCNC: NEGATIVE GM/DL
FENTANYL IA: NEGATIVE NG/ML
GABAPENTIN IA: NEGATIVE UG/ML
MEPERIDINE SERPLBLD-MCNC: NEGATIVE NG/ML
METHADONE BLD-MCNC: NEGATIVE NG/ML
OPIATES SPEC-MCNC: NEGATIVE NG/ML
OTHER DRUGS DETECTED: ABNORMAL
OXYCODONE SERPLBLD SCN-MCNC: NEGATIVE NG/ML
PCP SPEC-MCNC: NEGATIVE NG/ML
PROPOXYPH SPEC-MCNC: NEGATIVE NG/ML
TRAMADOL BLD-MCNC: NEGATIVE NG/ML

## 2021-04-24 ENCOUNTER — MYC REFILL (OUTPATIENT)
Dept: ANESTHESIOLOGY | Facility: CLINIC | Age: 36
End: 2021-04-24

## 2021-04-24 DIAGNOSIS — G89.4 CHRONIC PAIN SYNDROME: ICD-10-CM

## 2021-04-24 DIAGNOSIS — B20 HUMAN IMMUNODEFICIENCY VIRUS (HIV) DISEASE (H): ICD-10-CM

## 2021-04-26 ENCOUNTER — TELEPHONE (OUTPATIENT)
Dept: INFECTIOUS DISEASES | Facility: CLINIC | Age: 36
End: 2021-04-26

## 2021-04-26 ENCOUNTER — TELEPHONE (OUTPATIENT)
Dept: ANESTHESIOLOGY | Facility: CLINIC | Age: 36
End: 2021-04-26

## 2021-04-26 NOTE — TELEPHONE ENCOUNTER
Refill request    Medication: Oxycodone Hcl 5 Mg Tablet      MNPMP Checked: Yes    Last refilled 04/01/21 for 15 tablets      Last clinic appointment: 04/01/21  Next clinic appointment: 06/10/21      Preferred pharmacy:    [id number]      Refill request    Medication: Tramadol Hcl 50 Mg Tablet      MNPMP Checked: Yes    Last refilled 03/19/21 for 90 tablets      Last clinic appointment: 04/01/21  Next clinic appointment: 06/10/21      Preferred pharmacy:    [phone]

## 2021-04-27 RX ORDER — TRAMADOL HYDROCHLORIDE 50 MG/1
50 TABLET ORAL 3 TIMES DAILY
Qty: 90 TABLET | Refills: 0 | Status: SHIPPED | OUTPATIENT
Start: 2021-04-27 | End: 2021-06-02

## 2021-04-27 RX ORDER — OXYCODONE AND ACETAMINOPHEN 5; 325 MG/1; MG/1
1-2 TABLET ORAL EVERY 6 HOURS PRN
Qty: 35 TABLET | Refills: 0 | Status: SHIPPED | OUTPATIENT
Start: 2021-04-27 | End: 2021-06-02

## 2021-04-28 ENCOUNTER — TELEPHONE (OUTPATIENT)
Dept: INFECTIOUS DISEASES | Facility: CLINIC | Age: 36
End: 2021-04-28

## 2021-05-09 ENCOUNTER — HEALTH MAINTENANCE LETTER (OUTPATIENT)
Age: 36
End: 2021-05-09

## 2021-05-26 ENCOUNTER — TELEPHONE (OUTPATIENT)
Dept: PHARMACY | Facility: CLINIC | Age: 36
End: 2021-05-26

## 2021-05-26 NOTE — TELEPHONE ENCOUNTER
Called patient to check in, her voicemail message states that she lost her phone but it sounds like she is still somehow remotely checking her voicemail since I did leave a generic voicemail on her machine.    Shauna Mcclelland, PharmD, BCACP  3:12 PM 05/26/21

## 2021-05-28 ENCOUNTER — VIRTUAL VISIT (OUTPATIENT)
Dept: PHARMACY | Facility: CLINIC | Age: 36
End: 2021-05-28
Payer: COMMERCIAL

## 2021-05-28 DIAGNOSIS — Z29.89 NEED FOR PNEUMOCYSTIS PROPHYLAXIS: ICD-10-CM

## 2021-05-28 DIAGNOSIS — B20 HUMAN IMMUNODEFICIENCY VIRUS (HIV) DISEASE (H): Primary | ICD-10-CM

## 2021-05-28 PROCEDURE — 99607 MTMS BY PHARM ADDL 15 MIN: CPT | Performed by: PHARMACIST

## 2021-05-28 PROCEDURE — 99606 MTMS BY PHARM EST 15 MIN: CPT | Performed by: PHARMACIST

## 2021-05-28 RX ORDER — SULFAMETHOXAZOLE AND TRIMETHOPRIM 400; 80 MG/1; MG/1
1 TABLET ORAL DAILY
Qty: 90 TABLET | Refills: 0 | Status: SHIPPED | OUTPATIENT
Start: 2021-05-28

## 2021-05-28 NOTE — Clinical Note
DEV only - [place] get [place] to be seen, she'll let us know what her schedule is once she knows it and we'll see about getting something set up.  Is missing meds >50% of the time :Feliberto MCQUEEN

## 2021-05-28 NOTE — PATIENT INSTRUCTIONS
Recommendations from today's MTM visit:                                                      1) Update your lab work the next time you are at the [facility].    2) Try using the [facility] to see if this helps you remember to take your medications!    Next MTM visit: 2-6 weeks    To schedule another MTM appointment, please call the clinic directly or you may call the MTM scheduling line at [phone] or toll-free at [phone].     I value your experience and would be very thankful for your time with providing feedback on our clinic survey. You may receive a survey via email or text message in the next few days.     Please feel free to contact me with any questions or concerns you have.      Take care!  Shauna Mcclelland, PharmD, BCACP

## 2021-05-28 NOTE — PROGRESS NOTES
"Medication Therapy Management (MTM) Encounter    ASSESSMENT:                            Medication Adherence/Access: See below for considerations    HIV: Poorly controlled, in large part due to medication nonadherence which she is again struggling with.  She is due for updating her lab work.    PCP ppx: Struggles to remember taking her Bactrim consistently    PLAN:                            - Updated rx's sent to patient's pharmacy of choice  - Update lab work the next time she is at the [facility] building  - Recommend using the iPhone [facility] to help with medication adherence  - She will call us when she gets the schedule after starting her new job, will help her schedule ID follow-up at that time    Follow-up: 2-6 weeks    Shauna Mcclelland, PharmD, BCACP    SUBJECTIVE/OBJECTIVE:                          Bella Fung is a 36 year old female called for a follow-up visit. She was referred to me from ID patient list/Dr. Wang.  Today's visit is a follow-up MTM visit from 2/18/2021.     Reason for visit: Medication check-in.    Allergies/ADRs: None  Tobacco: None  Alcohol: none  Illicit drug use: Smoking marijuana \" a lot\"    Medication Adherence/Access:   Patient uses bubble pack(s).  Patient takes medications 1 time(s) per day.   Per patient, misses medication 4 times per week.   Medication barriers: feelings of burden/being overwhelmed.   The patient fills medications at [place]: NO, fills medications at [facility] with blister packs ( is seen at [facility])     Physically feeling well, although stressed.  She denies any fever, sore throat, chills, night sweats, thrush, unexplained weight loss, stomach upset, nausea, vomiting, constipation, diarrhea, headache.  She endorses a cough, but states that this is from her marijuana use.    HIV - dx March 2011  Current medications: Biktarvy    Past use of medication nonadherence to tolerability.  She is currently tolerating her Biktarvy, however is " now having trouble remembering to take her medications because of stress, she estimates that she has forgotten more than 50% of the time in the past month.  She has an 7th grader and a senior graduating from middle school and high school respectively.  [place] and caring for her  with cancer.  She is requesting refills of her medication today.    Past medications:   Tivicay + Descovy - switched for simplification   Prezcobix (darunavir/cobicistat) + Descovy - switched 10/2017 due to nausea, diarrhea   Prezcobix (darunavir/cobicistat) + Truvada - switched 1/2017   Darunavir/ritonavir + Truvada - switched 10/2015  Atazanavir/ritonavir + Truvada (started 2012) - switched 8/2013 due to loose stools     Mutations (reviewed 2/23/21):  NNRTI: K101E, K103N/S, E138K, K238T  HLA B 5710: negative     OI prophylaxis  Bactrim (Sulfamethoxazole/Trimethoprim) 400/80 -1 tablet daily     Today's Vitals: There were no vitals taken for this visit.     BP Readings from Last 3 Encounters:   04/01/21 138/86   12/09/20 134/84   09/04/20 (!) 140/74      Recent Labs   Lab Test 07/31/20  0840   CHOL 131   HDL 58   LDL 60   TRIG 66     Lab Results   Component Value Date    A1C 6.3 07/31/2020    A1C 7.1 10/31/2019    A1C 6.4 06/27/2019      Sodium   Date Value Ref Range Status   12/09/2020 140 133 - 144 mmol/L Final     Potassium   Date Value Ref Range Status   12/09/2020 3.3 (L) 3.4 - 5.3 mmol/L Final     Chloride   Date Value Ref Range Status   12/09/2020 107 94 - 109 mmol/L Final     Carbon Dioxide   Date Value Ref Range Status   12/09/2020 26 20 - 32 mmol/L Final     Anion Gap   Date Value Ref Range Status   12/09/2020 6 3 - 14 mmol/L Final     Glucose   Date Value Ref Range Status   12/09/2020 95 70 - 99 mg/dL Final     Urea Nitrogen   Date Value Ref Range Status   12/09/2020 12 7 - 30 mg/dL Final     Creatinine   Date Value Ref Range Status   12/09/2020 0.80 0.52 - 1.04 mg/dL Final      GFR Estimate   Date Value Ref Range Status   12/09/2020 >90 >60 mL/min/[1.73_m2] Final     Comment:     Non  GFR Calc  Starting 12/18/2018, serum creatinine based estimated GFR (eGFR) will be   calculated using the Chronic Kidney Disease Epidemiology Collaboration   (CKD-EPI) equation.       Calcium   Date Value Ref Range Status   12/09/2020 8.4 (L) 8.5 - 10.1 mg/dL Final       Lab Results   Component Value Date    HIV-1 RNA Quant Result 94 04/01/2021    HIV RNA QT Log 2.0 04/01/2021      HIV Latest Ref Rng & Units 12/9/2020   CD3, Mature T 49 - 84 % 84   CD4, [place] T 28 - 63 % 2 (L)   CD8, Suppressor T 10 - 40 % 81 (H)   CD4/CD8 Ratio 1.40 - 2.60 0.02 (L)   Absolute CD4 441 - 2,156 cells/uL 27 (L)     Lab Results   Component Value Date    CR 0.80 12/09/2020      Recent Labs   Lab Test 12/09/20  1751   WBC 3.7*   RBC 4.43   HGB 12.6   HCT 39.7   MCV 90   MCH 28.4   MCHC 31.7   RDW 13.4         Recent Labs   Lab Test 12/09/20  1751   PROTTOTAL 7.3   ALBUMIN 3.2*   BILITOTAL 0.3   ALKPHOS 62   AST 20   ALT 22        ----------------  I spent 20 minutes with this patient today. All changes were made via collaborative practice agreement with Dr. Wang. A copy of the visit note was provided to the patient's referring provider.  The patient was sent via [employer] a summary of these recommendations.     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)    Current Medication: bictegravir-emtricitabine-tenofovir (BIKTARVY) -25 MG per tablet (Discontinued)   Rationale: Medication requires monitoring - Needs additional monitoring   Recommendation: Order Lab - Biktarvy -25 MG Tabs - Update labs   Status: Accepted per [facility]         Human  immunodeficiency virus (HIV) disease (H)    Current Medication: bictegravir-emtricitabine-tenofovir (BIKTARVY) -25 MG per tablet   Rationale: Patient forgets to take - Adherence - Adherence   Recommendation: Provide Adherence Intervention - Biktarvy -25 MG Tabs - Try [facility]   Status: Patient Agreed - Adherence/Education         Need for pneumocystis prophylaxis    Current Medication: sulfamethoxazole-trimethoprim (BACTRIM) 400-80 MG tablet   Rationale: Patient forgets to take - Adherence - Adherence   Recommendation: Provide Adherence Intervention - Bactrim 400-80 MG Tabs - Try [facility]   Status: Patient Agreed - Adherence/Education

## 2021-06-01 ENCOUNTER — TELEPHONE (OUTPATIENT)
Dept: ANESTHESIOLOGY | Facility: CLINIC | Age: 36
End: 2021-06-01

## 2021-06-01 NOTE — TELEPHONE ENCOUNTER
[facility] Call Center    Phone Message    May a detailed message be left on voicemail: yes     Reason for Call: Medication Refill Request    Has the patient contacted the pharmacy for the refill? Yes   Name of medication being requested: Tramadol   Provider who prescribed the medication: Dr. Rosenbaum  Pharmacy: [place] [phone]  Date medication is needed: as soon as possible. Due 5/26/21, patient has 3 remaining.        [facility]    Phone Message    May a detailed message be left on voicemail: yes     Reason for Call: Medication Refill Request    Has the patient contacted the pharmacy for the refill? Yes   Name of medication being requested: Percocet  Provider who prescribed the medication: Dr. Alcantar  Pharmacy: [place] [phone]  Date medication is needed: as soon as possible. Due 5/26/21, patient has 3 remaining.          Action Taken: Message routed to:  [facility]    Travel Screening: Not Applicable

## 2021-06-02 ENCOUNTER — TELEPHONE (OUTPATIENT)
Dept: ANESTHESIOLOGY | Facility: CLINIC | Age: 36
End: 2021-06-02

## 2021-06-02 ENCOUNTER — TELEPHONE (OUTPATIENT)
Dept: PHARMACY | Facility: CLINIC | Age: 36
End: 2021-06-02

## 2021-06-02 DIAGNOSIS — B20 HUMAN IMMUNODEFICIENCY VIRUS (HIV) DISEASE (H): ICD-10-CM

## 2021-06-02 DIAGNOSIS — G89.4 CHRONIC PAIN SYNDROME: ICD-10-CM

## 2021-06-02 RX ORDER — OXYCODONE AND ACETAMINOPHEN 5; 325 MG/1; MG/1
1-2 TABLET ORAL EVERY 6 HOURS PRN
Qty: 35 TABLET | Refills: 0 | Status: SHIPPED | OUTPATIENT
Start: 2021-06-02 | End: 2021-07-02

## 2021-06-02 RX ORDER — TRAMADOL HYDROCHLORIDE 50 MG/1
50 TABLET ORAL 3 TIMES DAILY
Qty: 90 TABLET | Refills: 0 | Status: SHIPPED | OUTPATIENT
Start: 2021-06-02 | End: 2021-07-02

## 2021-06-02 NOTE — TELEPHONE ENCOUNTER
Refill request    Medication: oxyCODONE-acetaminophen (PERCOCET) 5-325 MG tablet      MNPMP Checked: Yes    Last refilled 05/18/21 for 35 tablets      Last clinic appointment: 04/01/21  Next clinic appointment: 06/10/21      Preferred pharmacy:    [phone]      Refill request    Medication: traMADol (ULTRAM) 50 MG tablet      MNPMP Checked: Yes    Last refilled 04/27/21 for 90 tablets    Last clinic appointment: 04/01/21  Next clinic appointment: 06/10/21      Preferred pharmacy:    [place], [place]- [address] [place] [address] [phone]

## 2021-06-02 NOTE — TELEPHONE ENCOUNTER
Pt is currently filling her HIV medication at [facility]. I won't be following her any longer.    Marlin Wise CPhT  [facility]  [phone]

## 2021-06-02 NOTE — TELEPHONE ENCOUNTER
Refill request routed to Dr. Rosenbaum to review.    Wen Bray, [facility]

## 2021-06-02 NOTE — TELEPHONE ENCOUNTER
Spoke with pt 5/28/2021.    Shauna Mcclelland, PharmD, [facility]  8:18 AM 06/02/21

## 2021-06-17 ENCOUNTER — TELEPHONE (OUTPATIENT)
Dept: ANESTHESIOLOGY | Facility: CLINIC | Age: 36
End: 2021-06-17

## 2021-06-17 NOTE — TELEPHONE ENCOUNTER
LPN called and left VM for the pt.   Pt missed follow up appointment on 6/10/21. Dr. Rosenbaum asked that LPN reach out to the pt and assist them to reschedule the appointment.   If they pt isn't following up routinely, the provider is not able to continue prescribing medications.     VM left for the pt asking for them to contact the clinic to schedule the a follow up.     Ashlyn Rodriguez LPN

## 2021-06-18 LAB — HIV PHENOTYPE: NORMAL

## 2021-06-21 RX ORDER — AZITHROMYCIN 600 MG/1
TABLET, FILM COATED ORAL
Qty: 10 TABLET | Refills: 4 | Status: SHIPPED | OUTPATIENT
Start: 2021-06-21 | End: 2021-10-22

## 2021-07-02 ENCOUNTER — TELEPHONE (OUTPATIENT)
Dept: ANESTHESIOLOGY | Facility: CLINIC | Age: 36
End: 2021-07-02

## 2021-07-02 NOTE — TELEPHONE ENCOUNTER
[facility] Call [facility]    Phone Message    May a detailed message be left on voicemail: yes     Reason for Call: Medication Refill Request    Has the patient contacted the pharmacy for the refill? Yes   Name of medication being requested: oxyCODONE-acetaminophen (PERCOCET) 5-325 MG tablet and traMADol (ULTRAM) 50 MG tablet  Provider who prescribed the medication: Robi   Pharmacy: [place], [place] - [address] [place] [address] [phone]  Date medication is needed: asap          Action Taken: Message routed to:  [facility]    Travel Screening: Not Applicable

## 2021-07-29 ENCOUNTER — OFFICE VISIT (OUTPATIENT)
Dept: ANESTHESIOLOGY | Facility: CLINIC | Age: 36
End: 2021-07-29
Payer: COMMERCIAL

## 2021-07-29 VITALS — OXYGEN SATURATION: 96 % | SYSTOLIC BLOOD PRESSURE: 127 MMHG | HEART RATE: 74 BPM | DIASTOLIC BLOOD PRESSURE: 85 MMHG

## 2021-07-29 DIAGNOSIS — B20 HUMAN IMMUNODEFICIENCY VIRUS (HIV) DISEASE (H): ICD-10-CM

## 2021-07-29 DIAGNOSIS — G89.4 CHRONIC PAIN SYNDROME: ICD-10-CM

## 2021-07-29 PROCEDURE — 99213 OFFICE O/P EST LOW 20 MIN: CPT | Performed by: ANESTHESIOLOGY

## 2021-07-29 RX ORDER — OXYCODONE AND ACETAMINOPHEN 5; 325 MG/1; MG/1
1-2 TABLET ORAL EVERY 6 HOURS PRN
Qty: 60 TABLET | Refills: 0 | Status: SHIPPED | OUTPATIENT
Start: 2021-07-29 | End: 2021-08-29

## 2021-07-29 RX ORDER — TRAMADOL HYDROCHLORIDE 50 MG/1
50 TABLET ORAL 2 TIMES DAILY PRN
Qty: 60 TABLET | Refills: 0 | Status: SHIPPED | OUTPATIENT
Start: 2021-07-29 | End: 2021-08-29

## 2021-07-29 RX ORDER — TRAMADOL HYDROCHLORIDE 50 MG/1
50 TABLET ORAL 3 TIMES DAILY
Qty: 60 TABLET | Refills: 0 | Status: SHIPPED | OUTPATIENT
Start: 2021-07-29 | End: 2021-07-29

## 2021-07-29 ASSESSMENT — PAIN SCALES - GENERAL: PAINLEVEL: EXTREME PAIN (8)
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
## 2021-07-29 NOTE — PATIENT INSTRUCTIONS
Medications:    oxyCODONE-acetaminophen (PERCOCET) 5-325 MG tablet- Take 1-2 tablets by mouth every 6 hours as needed for severe pain.    traMADol (ULTRAM) 50 MG tablet- 1 tablet two times daily, as needed.     For refills of opioid medications - You MUST call (Or MyChart) the clinic DIRECTLY and at least 7 days before you run out of your medication.      Recommended Follow up:      2-3 months with Dr. Rosenbaum or The Nurse Practitoner, Tracey Beard.        To speak with a nurse, schedule/reschedule/cancel a clinic appointment, or request a medication refill call: [phone], option #1.    You can also reach us by [facility]: [email or web address]

## 2021-08-03 ENCOUNTER — TELEPHONE (OUTPATIENT)
Dept: ANESTHESIOLOGY | Facility: CLINIC | Age: 36
End: 2021-08-03

## 2021-08-03 ENCOUNTER — TELEPHONE (OUTPATIENT)
Dept: INFECTIOUS DISEASES | Facility: CLINIC | Age: 36
End: 2021-08-03

## 2021-08-03 NOTE — TELEPHONE ENCOUNTER
----- Message from DAISHA Hart sent at 7/22/2021 10:36 AM CDT -----  Please contact pt to reschedule RTN DARYA Wang, live or virtual, no show last attemtpt

## 2021-08-18 ENCOUNTER — TELEPHONE (OUTPATIENT)
Dept: PHARMACY | Facility: CLINIC | Age: 36
End: 2021-08-18

## 2021-08-18 NOTE — TELEPHONE ENCOUNTER
Called patient to check-in, no answer, LMOM.  Sent text message via clinic cell phone.    Shauna Mcclelland, PharmD, BCACP  4:54 PM 08/18/21

## 2021-08-26 NOTE — TELEPHONE ENCOUNTER
Spoke with patient - she is going to continue getting her HIV care at [facility] and will get pain medications at [facility].  She is connected with [facility] ID team and Sudheer Lopez, EdwinD there.        Reports that she was diagnosed with thrush on 8/15/2021 -- is now recovering and feeling better.  Unfortunately she does have thrush and has been unable to swallow so has not taken medications for the past couple weeks, she is picking up medications for this today.    Shuana Mcclelland, Diane, [facility]  9:26 AM 08/26/21

## 2021-08-27 ENCOUNTER — TELEPHONE (OUTPATIENT)
Dept: ANESTHESIOLOGY | Facility: CLINIC | Age: 36
End: 2021-08-27

## 2021-08-27 ENCOUNTER — TELEPHONE (OUTPATIENT)
Dept: ANESTHESIOLOGY | Facility: CLINIC | Age: 36
End: 2021-08-27
Payer: COMMERCIAL

## 2021-08-27 DIAGNOSIS — B20 HUMAN IMMUNODEFICIENCY VIRUS (HIV) DISEASE (H): ICD-10-CM

## 2021-08-27 DIAGNOSIS — G89.4 CHRONIC PAIN SYNDROME: ICD-10-CM

## 2021-08-27 NOTE — TELEPHONE ENCOUNTER
Refill request routed to the clinic on 8/27/21    Two Medications requested:     oxyCODONE-acetaminophen (PERCOCET) 5-325 MG tablet    traMADol (ULTRAM) 50 MG tablet      [place]NPMP Checked: Yes    Percocet: Last refilled 7/29/21 for 60 tablets    Tramadol: Last refilled 90 for 7/5/21 tablets      Last clinic appointment: 7/29/21  Next clinic appointment: Not scheduled. Pt will be contacted to make appointment.     Last Drug Screen Collected:  4/1/21  Controlled Substance Agreement signed: 4/1/21      Preferred pharmacy:    [address] [phone]    Recommendations from note on 7/29/21  1. Change tramadol from 50 mg TID PRN to 50 mg BID PRN  2. Change percocet 5/325 from daily PRN to BID PRN  3. Patient encouraged to receive Covid-19 vaccination  4. Additional muscle relaxant medication trial with metaxalone was discussed, but deferred at this time    Refill requested to the provider on 8/27/21.    Ashlyn Rodriguez LPN

## 2021-08-27 NOTE — TELEPHONE ENCOUNTER
[facility] Call Center    Phone Message    May a detailed message be left on voicemail: yes     Reason for Call: Medication Refill Request    Has the patient contacted the pharmacy for the refill? Yes   Name of medication being requested: oxyCODONE-acetaminophen (PERCOCET) 5-325 MG tablet, traMADol (ULTRAM) 50 MG tablet  Provider who prescribed the medication: Dr. Roesnbaum  Pharmacy:    [phone]      Date medication is needed: Soon patient is almost out.          Action Taken: Message routed to:  [facility]    Travel Screening: Not Applicable

## 2021-08-27 NOTE — TELEPHONE ENCOUNTER
Patient needs a follow up appointment with Dr. Rosenbaum in 2 months.     Encounter routed to clinic coordinators.     Ashlyn Rodriguez LPN

## 2021-08-29 ENCOUNTER — HEALTH MAINTENANCE LETTER (OUTPATIENT)
Age: 36
End: 2021-08-29

## 2021-08-29 RX ORDER — OXYCODONE AND ACETAMINOPHEN 5; 325 MG/1; MG/1
1-2 TABLET ORAL EVERY 6 HOURS PRN
Qty: 60 TABLET | Refills: 0 | Status: SHIPPED | OUTPATIENT
Start: 2021-08-29 | End: 2021-09-28

## 2021-08-29 RX ORDER — TRAMADOL HYDROCHLORIDE 50 MG/1
50 TABLET ORAL 2 TIMES DAILY PRN
Qty: 60 TABLET | Refills: 0 | Status: SHIPPED | OUTPATIENT
Start: 2021-09-04 | End: 2021-09-28

## 2021-08-30 ENCOUNTER — TELEPHONE (OUTPATIENT)
Dept: ANESTHESIOLOGY | Facility: CLINIC | Age: 36
End: 2021-08-30

## 2021-09-27 ENCOUNTER — TELEPHONE (OUTPATIENT)
Dept: ANESTHESIOLOGY | Facility: CLINIC | Age: 36
End: 2021-09-27

## 2021-09-27 NOTE — TELEPHONE ENCOUNTER
[facility] Call Center    Phone Message    May a detailed message be left on voicemail: yes     Reason for Call: Medication Refill Request    Has the patient contacted the pharmacy for the refill? Yes   Name of medication being requested: oxyCODONE-acetaminophen (PERCOCET) 5-325 MG tablet  Provider who prescribed the medication: Dr Rosenbaum  Pharmacy: [phone]  Date medication is needed: ASAP      Name of medication being requested: traMADol (ULTRAM) 50 MG tablet  Provider who prescribed the medication: Dr Rosenbaum  Pharmacy: [phone]  Date medication is needed: ASAP         Action Taken: Message routed to:  [facility] Pain Clinic    Travel Screening: Not Applicable

## 2021-09-28 ENCOUNTER — TELEPHONE (OUTPATIENT)
Dept: ANESTHESIOLOGY | Facility: CLINIC | Age: 36
End: 2021-09-28

## 2021-09-28 DIAGNOSIS — G89.4 CHRONIC PAIN SYNDROME: ICD-10-CM

## 2021-09-28 DIAGNOSIS — B20 HUMAN IMMUNODEFICIENCY VIRUS (HIV) DISEASE (H): ICD-10-CM

## 2021-09-28 RX ORDER — OXYCODONE AND ACETAMINOPHEN 5; 325 MG/1; MG/1
1-2 TABLET ORAL EVERY 6 HOURS PRN
Qty: 60 TABLET | Refills: 0 | Status: SHIPPED | OUTPATIENT
Start: 2021-10-01 | End: 2021-10-22

## 2021-09-28 RX ORDER — TRAMADOL HYDROCHLORIDE 50 MG/1
50 TABLET ORAL 2 TIMES DAILY PRN
Qty: 60 TABLET | Refills: 0 | Status: SHIPPED | OUTPATIENT
Start: 2021-10-01 | End: 2021-10-22 | Stop reason: ALTCHOICE

## 2021-09-28 NOTE — TELEPHONE ENCOUNTER
Refill request routed to the clinic on 9/27/21     Two Medications requested:      oxyCODONE-acetaminophen (PERCOCET) 5-325 MG tablet     traMADol (ULTRAM) 50 MG tablet        MNPMP Checked: Yes     Percocet: Last refilled 9/1/21 for 60 tablets     Tramadol: Last refilled 9/1/21 for 60 tablets        Last clinic appointment: 7/29/21  Next clinic appointment: 10/22/21 Video visit with ZACH Jenkins CNP     Last Drug Screen Collected:  4/1/21  Controlled Substance Agreement signed: 4/1/21        Preferred pharmacy:     [place], [place] - [address] [place] [address] [phone]     Recommendations from note on 7/29/21  1. Change tramadol from 50 mg TID PRN to 50 mg BID PRN  2. Change percocet 5/325 from daily PRN to BID PRN  3. Patient encouraged to receive Covid-19 vaccination  4. Additional muscle relaxant medication trial with metaxalone was discussed, but deferred at this time     Refill requested to the provider on 9/28/21.     Ashlyn Rodriguez LPN

## 2021-10-21 NOTE — PROGRESS NOTES
How would you like to obtain your AVS? MyChart  If the video visit is dropped, the invitation should be resent by: Text to cell phone: [phone]  Will anyone else be joining your video visit? No  {      Video Start Time: 9:09 AM    [facility]     Date of visit: 10/21/2021      Assessment:   The patient is a 36 year old female with PMHx of asthma, HIV/AIDS and chronic low back pain who returns to clinic today via video visit for continued management of chronic low back pain.    1) Low back pain:  As noted at her previous evaluation, this is likely due to myofascial pain with some component of underlying facet mediated pain.       Visit Diagnoses:  1. Chronic bilateral low back pain without sciatica    2. Chronic, continuous use of opioids    3. Chronic pain syndrome        Plan:    Therapies:   1. Physical Therapy: I highly recommend this resource as first line treatment for low back pain and it does not sounds like she has tried previously. She will start with Rehab physical therapy as he schedule allows with visits weekly or every other week. Encouraged she work on gradually developing a manageable home exercise program. If this is not well tolerated may consider pain physical therapy.   2. Clinical Health Psychologist to address issues of relaxation, behavioral change, coping style, and other factors important to improvement: not at this time.   Medication Management:   1. Bella reports very good pain and sleep benefit with recreational marijuana. She is interested in medical cannabis and meets criteria for intractable pain. For this reason I have certified her through the [place] Department of [facility] Medical Cannabis division online. Once started, stop smoking recreational marijuana.  2. Bella reports very good pain benefit with oxycodone and Tramadol and notes these medications allow her to be more active. She works 3 jobs and is clearly functioning well. No aberrant behaviors. We  discussed opioids are not a great option for long term management of chronic pain and I would recommend a gradual taper off. She is open to this. We will discontinue Tramadol first per her request. We will increase Percocet 5/325mg to max of 3 tabs a day (from 2 tabs a day). Refilled with start date of 10/30.  We will likely reduce from 90 tabs/mo to 75 tabs/mo with the next prescription and so on until off.  3. Consider repeat trial of Cymbalta but at an increased dose. We will discuss at next visit.      Further procedures recommended:   3. We do not need to discuss further if not interested.  Other: consider a trial of acupuncture.     Follow up: with Tracey SERRANO CNP or Dr. Rosenbaum in 4 weeks      Tracey SERRANO CNP  [facility] Pain [facility]     -------------------------------------------------    Subjective:    Chief complaint:   Chief Complaint   Patient presents with     Pain     new consult       Interval history:  Bella Fung is a 36 year old female last seen on 7/29/2021.  She is a patient of Dr. Rosenbaum seen in follow up.     Recommendations/plan at the last visit included:  Work up:    None at this time     Referrals:    None at this time     Medications:    Tramadol 50 mg decreased from 3 times daily as needed to twice daily as needed, #60 for 30 days    Norco 5/325 increased from daily as needed to twice daily as needed, #60 for 30 days     Therapies:    None at this time, however consider future physical therapy versus occupational therapy to assess for ergonomic adjustments that may help reduce pain at work     Interventions:    None at this time     Follow up: 8-12 weeks    Since her last visit, Bella Fung reports:  -Her pain is about the same as it was at last visit.   -She continues to report significant axial low back pain that is bothersome.  -She continues to take Tramadol 50mg BID prn with good pain benefit with minimal side effects. She takes this medication  "as needed at work as she notes she does not get sleepy. She also take Percocet as needed in the evening after work, max of 2 tabs a day. She finds these medications helpful for increasing activity level. She is open to reducing her overall opioid dose.   -She has never tried chronic pain physical therapy as recommended. She tries to walk on a regular basis at work. It has been a while since she has done physical therapy.   -Dr. Rosenbaum has recommended injections for management of her pain. She states she is \"petrified\" of this as her dad developed meningitis after an injection. She is not interested in injections.  -She smokes marijuana, \"I smoke a lot of marijuana\" with benefit for pain and sleep. She might be interested in medical cannabis.     HPI per Dr. Rosenbaum :  The patient is a 34 year old female with past medical history of asthma, HIV/AIDS and chronic low back pain who presents for evaluation of low back pain.  The patient's pain began about 8 years ago without any particular precipitating event and has been fluctuant since that time.  She has been having more frequent episodes than she has in the past.  She reports that her pain is located primarily in the central low back and radiates to both legs.  The patient describes the pain as throbbing.  She reports that the pain is made worse by standing and sitting at work.  Her pain is improved with warm baths.  She denies any lower extremity symptoms associated with her pain.  She has issues with noctiuria and daytime urge incontinence which started about a year ago and have been getting slowly and progressively worse.  She denies any new problems with falls or balance, any new numbness or weakness of the arms or legs, any new bowel or bladder incontinence, or any sudden or unexpected weight loss.  The patient's pain is most severe during greater activity which is typically at night.  she rates her average pain score at 8/10, but it can be as low as 5/10 or " as severe as 10/10.        Pain Information:   Pain rating: averages 10/10 on a 0-10 scale.      Current pain medications:   - Ibuprofen 600 mg QID PRN  - Percocet 1-2 tablets Q6h PRN, #60 for 30 days  - Tramadol 50 mg TID PRN, #60 for 30 days    Current MME: 25    THE 4 A's OF OPIOID MAINTENANCE ANALGESIA     Analgesia: Reports improved pain control with doses of medication     Activity: She has greater tolerance for activity at work and at home     Adverse effects: denies      Review of [place] Prescription Monitoring Program (): No concern for abuse or misuse of controlled medications based on this report.     Annual Controlled Substance Agreement/UDS due date: April of 2022    Anti-convulsants: Not tried  Muscle relaxors: Not tried  Anti-depressants: Cymbalta -?  NSAIDs: Currently using Ibuprofen with moderate benefit, Mobic- ?  Acetaminophen: Currently using in combination therapy  Topicals: Not tried  Opioids: Tramadol and oxycodone currently     Other treatments have included:  Physical therapy: Not tried  Pain Psychology: Not tried  Chiropractic: Not tried  Acupuncture: Not tried  TENs Unit: Not tried    Medications:  Current Outpatient Medications   Medication Sig Dispense Refill     azithromycin (ZITHROMAX) 600 MG tablet TAKE TWO TABLETS BY MOUTH EVERY 7 DAYS. DO NOT TAKE DAILY. TAKE ON THE SAME DAY EVERY WEEK.. (Patient not taking: Reported on 7/29/2021) 10 tablet 4     bictegravir-emtricitabine-tenofovir (BIKTARVY) -25 MG per tablet Take 1 tablet by mouth daily 90 tablet 0     methocarbamol (ROBAXIN) 750 MG tablet Take 1 tablet (750 mg) by mouth 4 times daily as needed for muscle spasms 90 tablet 1     naloxone (NARCAN) 4 MG/0.1ML nasal spray Spray 1 spray (4 mg) into one nostril alternating nostrils once as needed for opioid reversal every 2-3 minutes until assistance arrives 0.2 mL 0     oxyCODONE-acetaminophen (PERCOCET) 5-325 MG tablet Take 1-2 tablets by mouth every 6 hours as needed for  severe pain 60 tablet 0     sulfamethoxazole-trimethoprim (BACTRIM) 400-80 MG tablet Take 1 tablet by mouth daily 90 tablet 0     traMADol (ULTRAM) 50 MG tablet Take 1 tablet (50 mg) by mouth 2 times daily as needed for severe pain 60 tablet 0       Medical History: any changes in medical history since they were last seen? No    Review of Systems: A 10-point review of systems was negative, with the exception of chronic pain issues.      Objective:    Physical Exam:  not currently breastfeeding.  Constitutional: Well developed, well nourished, appears stated age. Obese.  HEENT: Head atraumatic, normocephalic. Eyes without conjunctival injection or jaundice. Oropharynx clear. Neck supple. No obvious neck masses.  Skin: No rash, lesions, or petechiae of exposed skin.   Psychiatric/mental status: Alert, without lethargy or stupor. Speech fluent. Appropriate affect. Mood normal. Able to follow commands without difficulty.     BILLING TIME DOCUMENTATION:   The total TIME spent on this patient on the date of the encounter/appointment was 42 minutes.      TOTAL TIME includes:   Time spent preparing to see the patient (reviewing records and tests)   Time spent face to face (or over the phone) with the patient   Time spent ordering tests, medications, procedures and referrals   Time spent Referring and communicating with other healthcare professionals   Time spent documenting clinical information in Epic       Video-Visit Details    Type of service:  Video Visit    Video End Time:9:41 AM    Originating Location (pt. Location): [facility]    Distant Location (provider location):  [facility] [place]     Platform used for Video Visit: [employer]

## 2021-10-22 ENCOUNTER — VIRTUAL VISIT (OUTPATIENT)
Dept: ANESTHESIOLOGY | Facility: CLINIC | Age: 36
End: 2021-10-22
Payer: COMMERCIAL

## 2021-10-22 DIAGNOSIS — M54.50 CHRONIC BILATERAL LOW BACK PAIN WITHOUT SCIATICA: Primary | ICD-10-CM

## 2021-10-22 DIAGNOSIS — F11.90 CHRONIC, CONTINUOUS USE OF OPIOIDS: ICD-10-CM

## 2021-10-22 DIAGNOSIS — G89.4 CHRONIC PAIN SYNDROME: ICD-10-CM

## 2021-10-22 DIAGNOSIS — G89.29 CHRONIC BILATERAL LOW BACK PAIN WITHOUT SCIATICA: Primary | ICD-10-CM

## 2021-10-22 PROCEDURE — 99215 OFFICE O/P EST HI 40 MIN: CPT | Mod: 95 | Performed by: NURSE PRACTITIONER

## 2021-10-22 RX ORDER — OXYCODONE AND ACETAMINOPHEN 5; 325 MG/1; MG/1
1 TABLET ORAL EVERY 6 HOURS PRN
Qty: 90 TABLET | Refills: 0 | Status: SHIPPED | OUTPATIENT
Start: 2021-10-30 | End: 2021-11-29

## 2021-10-22 ASSESSMENT — PAIN SCALES - GENERAL: PAINLEVEL: WORST PAIN (10)

## 2021-10-22 NOTE — LETTER
Date:December 23, 2021      Patient was self referred, no letter generated. Do not send.        [facility] Health Information

## 2021-10-22 NOTE — LETTER
10/22/2021       RE: Bella Fung  [address]  [address]  [facility] [address]     Dear Colleague,    Thank you for referring your patient, Bella Fung, to the [facility] FOR COMPREHENSIVE PAIN MANAGEMENT [place] at [facility]. Please see a copy of my visit note below.    How would you like to obtain your AVS? MyChart  If the video visit is dropped, the invitation should be resent by: Text to cell phone: [phone]  Will anyone else be joining your video visit? No  {      Video Start Time: 9:09 AM    [facility] Pain Management     Date of visit: 10/21/2021      Assessment:   The patient is a 36 year old female with PMHx of asthma, HIV/AIDS and chronic low back pain who returns to clinic today via video visit for continued management of chronic low back pain.    1) Low back pain:  As noted at her previous evaluation, this is likely due to myofascial pain with some component of underlying facet mediated pain.       Visit Diagnoses:  1. Chronic bilateral low back pain without sciatica    2. Chronic, continuous use of opioids    3. Chronic pain syndrome        Plan:    Therapies:   1. Physical Therapy: I highly recommend this resource as first line treatment for low back pain and it does not sounds like she has tried previously. She will start with Rehab physical therapy as he schedule allows with visits weekly or every other week. Encouraged she work on gradually developing a manageable home exercise program. If this is not well tolerated may consider pain physical therapy.   2. Clinical Health Psychologist to address issues of relaxation, behavioral change, coping style, and other factors important to improvement: not at this time.   Medication Management:   1. Bella reports very good pain and sleep benefit with recreational marijuana. She is interested in medical cannabis and meets criteria for intractable pain. For this reason I  have certified her through the [facility] Medical Cannabis division online. Once started, stop smoking recreational marijuana.  2. Bella reports very good pain benefit with oxycodone and Tramadol and notes these medications allow her to be more active. She works 3 jobs and is clearly functioning well. No aberrant behaviors. We discussed opioids are not a great option for long term management of chronic pain and I would recommend a gradual taper off. She is open to this. We will discontinue Tramadol first per her request. We will increase Percocet 5/325mg to max of 3 tabs a day (from 2 tabs a day). Refilled with start date of 10/30.  We will likely reduce from 90 tabs/mo to 75 tabs/mo with the next prescription and so on until off.  3. Consider repeat trial of Cymbalta but at an increased dose. We will discuss at next visit.      Further procedures recommended:   3. We do not need to discuss further if not interested.  Other: consider a trial of acupuncture.     Follow up: with Tracey SERRANO CNP or Dr. Rosenbaum in 4 weeks      Tracey SERRANO CNP  [facility]     -------------------------------------------------    Subjective:    Chief complaint:   Chief Complaint   Patient presents with     Pain     new consult       Interval history:  Bella Fung is a 36 year old female last seen on 7/29/2021.  She is a patient of Dr. Rosenbaum seen in follow up.     Recommendations/plan at the last visit included:  Work up:    None at this time     Referrals:    None at this time     Medications:    Tramadol 50 mg decreased from 3 times daily as needed to twice daily as needed, #60 for 30 days    Norco 5/325 increased from daily as needed to twice daily as needed, #60 for 30 days     Therapies:    None at this time, however consider future physical therapy versus occupational therapy to assess for ergonomic adjustments that may help reduce pain at work     Interventions:    None at  "this time     Follow up: 8-12 weeks    Since her last visit, Bella Fung reports:  -Her pain is about the same as it was at last visit.   -She continues to report significant axial low back pain that is bothersome.  -She continues to take Tramadol 50mg BID prn with good pain benefit with minimal side effects. She takes this medication as needed at work as she notes she does not get sleepy. She also take Percocet as needed in the evening after work, max of 2 tabs a day. She finds these medications helpful for increasing activity level. She is open to reducing her overall opioid dose.   -She has never tried chronic pain physical therapy as recommended. She tries to walk on a regular basis at work. It has been a while since she has done physical therapy.   -Dr. Rosenbaum has recommended injections for management of her pain. She states she is \"petrified\" of this as her dad developed meningitis after an injection. She is not interested in injections.  -She smokes marijuana, \"I smoke a lot of marijuana\" with benefit for pain and sleep. She might be interested in medical cannabis.     HPI per Dr. Rosenbaum :  The patient is a 34 year old female with past medical history of asthma, HIV/AIDS and chronic low back pain who presents for evaluation of low back pain.  The patient's pain began about 8 years ago without any particular precipitating event and has been fluctuant since that time.  She has been having more frequent episodes than she has in the past.  She reports that her pain is located primarily in the central low back and radiates to both legs.  The patient describes the pain as throbbing.  She reports that the pain is made worse by standing and sitting at work.  Her pain is improved with warm baths.  She denies any lower extremity symptoms associated with her pain.  She has issues with noctiuria and daytime urge incontinence which started about a year ago and have been getting slowly and progressively worse.  " She denies any new problems with falls or balance, any new numbness or weakness of the arms or legs, any new bowel or bladder incontinence, or any sudden or unexpected weight loss.  The patient's pain is most severe during greater activity which is typically at night.  she rates her average pain score at 8/10, but it can be as low as 5/10 or as severe as 10/10.        Pain Information:   Pain rating: averages 10/10 on a 0-10 scale.      Current pain medications:   - Ibuprofen 600 mg QID PRN  - Percocet 1-2 tablets Q6h PRN, #60 for 30 days  - Tramadol 50 mg TID PRN, #60 for 30 days    Current MME: 25    THE 4 A's OF OPIOID MAINTENANCE ANALGESIA     Analgesia: Reports improved pain control with doses of medication     Activity: She has greater tolerance for activity at work and at home     Adverse effects: denies      Review of [place] Prescription Monitoring Program (): No concern for abuse or misuse of controlled medications based on this report.     Annual Controlled Substance Agreement/UDS due date: April of 2022    Anti-convulsants: Not tried  Muscle relaxors: Not tried  Anti-depressants: Cymbalta -?  NSAIDs: Currently using Ibuprofen with moderate benefit, Mobic- ?  Acetaminophen: Currently using in combination therapy  Topicals: Not tried  Opioids: Tramadol and oxycodone currently     Other treatments have included:  Physical therapy: Not tried  Pain Psychology: Not tried  Chiropractic: Not tried  Acupuncture: Not tried  TENs Unit: Not tried    Medications:  Current Outpatient Medications   Medication Sig Dispense Refill     azithromycin (ZITHROMAX) 600 MG tablet TAKE TWO TABLETS BY MOUTH EVERY 7 DAYS. DO NOT TAKE DAILY. TAKE ON THE SAME DAY EVERY WEEK.. (Patient not taking: Reported on 7/29/2021) 10 tablet 4     bictegravir-emtricitabine-tenofovir (BIKTARVY) -25 MG per tablet Take 1 tablet by mouth daily 90 tablet 0     methocarbamol (ROBAXIN) 750 MG tablet Take 1 tablet (750 mg) by mouth 4 times  daily as needed for muscle spasms 90 tablet 1     naloxone (NARCAN) 4 MG/0.1ML nasal spray Spray 1 spray (4 mg) into one nostril alternating nostrils once as needed for opioid reversal every 2-3 minutes until assistance arrives 0.2 mL 0     oxyCODONE-acetaminophen (PERCOCET) 5-325 MG tablet Take 1-2 tablets by mouth every 6 hours as needed for severe pain 60 tablet 0     sulfamethoxazole-trimethoprim (BACTRIM) 400-80 MG tablet Take 1 tablet by mouth daily 90 tablet 0     traMADol (ULTRAM) 50 MG tablet Take 1 tablet (50 mg) by mouth 2 times daily as needed for severe pain 60 tablet 0       Medical History: any changes in medical history since they were last seen? No    Review of Systems: A 10-point review of systems was negative, with the exception of chronic pain issues.      Objective:    Physical Exam:  not currently breastfeeding.  Constitutional: Well developed, well nourished, appears stated age. Obese.  HEENT: Head atraumatic, normocephalic. Eyes without conjunctival injection or jaundice. Oropharynx clear. Neck supple. No obvious neck masses.  Skin: No rash, lesions, or petechiae of exposed skin.   Psychiatric/mental status: Alert, without lethargy or stupor. Speech fluent. Appropriate affect. Mood normal. Able to follow commands without difficulty.     BILLING TIME DOCUMENTATION:   The total TIME spent on this patient on the date of the encounter/appointment was 42 minutes.      TOTAL TIME includes:   Time spent preparing to see the patient (reviewing records and tests)   Time spent face to face (or over the phone) with the patient   Time spent ordering tests, medications, procedures and referrals   Time spent Referring and communicating with other healthcare professionals   Time spent documenting clinical information in Epic       Video-Visit Details    Type of service:  Video Visit    Video End Time:9:41 AM    Originating Location (pt. Location): [facility]    Distant Location (provider location):  [facility]  [facility] FOR COMPREHENSIVE PAIN MANAGEMENT [place]     Platform used for Video Visit: Otilia            Again, thank you for allowing me to participate in the care of your patient.      Sincerely,    ZACH Viera CNP

## 2021-10-22 NOTE — PATIENT INSTRUCTIONS
Therapies:   1. Physical Therapy: I highly recommend this resource. Start with Rehab physical therapy as your schedule allows with visits weekly or every other week. Work on gradually developing a manageable home exercise program.   2. Clinical Health Psychologist to address issues of relaxation, behavioral change, coping style, and other factors important to improvement: not at this time.   Medication Management:   1. You have been certified for medical cannabis. They will contact you for next steps. Once started, stop smoking recreational marijuana.  2. Discontinue Tramadol. Increase Percocet 5/325mg to max of 3 tabs a day. Refilled with start date of 10/30.  We will likely reduce from 90 tabs/mo to 75 tabs/mo with the next prescription and so on until off.  3. Consider repeat trial of Cymbalta but at an increased dose. We will discuss at next visit.      Further procedures recommended:   3. We do not need to discuss further if not interested.  Other: consider a trial of acupuncture.     Follow up: with Tracey SERRANO CNP in 4 weeks

## 2021-10-24 ENCOUNTER — HEALTH MAINTENANCE LETTER (OUTPATIENT)
Age: 36
End: 2021-10-24

## 2021-11-28 ENCOUNTER — MYC REFILL (OUTPATIENT)
Dept: ANESTHESIOLOGY | Facility: CLINIC | Age: 36
End: 2021-11-28
Payer: COMMERCIAL

## 2021-11-28 DIAGNOSIS — G89.4 CHRONIC PAIN SYNDROME: ICD-10-CM

## 2021-11-28 RX ORDER — OXYCODONE AND ACETAMINOPHEN 5; 325 MG/1; MG/1
1 TABLET ORAL EVERY 6 HOURS PRN
Qty: 90 TABLET | Refills: 0 | Status: CANCELLED | OUTPATIENT
Start: 2021-11-28

## 2021-11-29 ENCOUNTER — TELEPHONE (OUTPATIENT)
Dept: PALLIATIVE MEDICINE | Facility: CLINIC | Age: 36
End: 2021-11-29
Payer: COMMERCIAL

## 2021-11-29 ENCOUNTER — TELEPHONE (OUTPATIENT)
Dept: ANESTHESIOLOGY | Facility: CLINIC | Age: 36
End: 2021-11-29
Payer: COMMERCIAL

## 2021-11-29 DIAGNOSIS — G89.4 CHRONIC PAIN SYNDROME: ICD-10-CM

## 2021-11-29 RX ORDER — OXYCODONE AND ACETAMINOPHEN 5; 325 MG/1; MG/1
1 TABLET ORAL EVERY 6 HOURS PRN
Qty: 75 TABLET | Refills: 0 | Status: SHIPPED | OUTPATIENT
Start: 2021-11-30 | End: 2021-12-22

## 2021-11-29 NOTE — TELEPHONE ENCOUNTER
Refills have been requested for the following medications:         oxyCODONE-acetaminophen (PERCOCET) 5-325 MG tablet [Celi Beard, APRN CNP]     Preferred pharmacy: [place], [place] - [address] [place] [address] [phone]

## 2021-11-29 NOTE — TELEPHONE ENCOUNTER
Signed Prescriptions:                        Disp   Refills    oxyCODONE-acetaminophen (PERCOCET) 5-325 M*75 tab*0        Sig: Take 1 tablet by mouth every 6 hours as needed for           severe pain  Authorizing Provider: VERO AMOS      Refilled per plan to 75 tabs/mo.    Reviewed [place] Prescription Monitoring Program, appears appropriate.     ZACH Jenkins CNP  [facility]

## 2021-11-29 NOTE — TELEPHONE ENCOUNTER
Pt calling stating she got a vm to schedule a follow up. Pt stating she is not sure who she should schedule a follow up with. Pt stating she saw Dr. Rosenbaum and XIOMARA Beard. pls follow up to advise. Thank you.

## 2021-11-29 NOTE — TELEPHONE ENCOUNTER
Refill request sent to clinic via [employer] on 11/28/21    Medication:     oxyCODONE-acetaminophen (PERCOCET) 5-325 MG tablet      [place] Checked: Yes     Last refilled 10/30/21 for 90 tablets      Last clinic appointment: 10/22/21  Next clinic appointment: LPN Called and left message for the pt, asking for them to call the clinic back to schedule appointment.     Message also sent to EMT to follow up and be sure that the pt is scheduled.     Last Drug Screen Collected:  4/1/21  Controlled Substance Agreement signed: 4/1/21      Preferred pharmacy:    [address] [phone]      Note from pt's Last visit with the provider:     Bella reports very good pain benefit with oxycodone and Tramadol and notes these medications allow her to be more active. She works 3 jobs and is clearly functioning well. No aberrant behaviors. We discussed opioids are not a great option for long term management of chronic pain and I would recommend a gradual taper off. She is open to this. We will discontinue Tramadol first per her request. We will increase Percocet 5/325mg to max of 3 tabs a day (from 2 tabs a day). Refilled with start date of 10/30.  We will likely reduce from 90 tabs/mo to 75 tabs/mo with the next prescription and so on until off.    Refill request routed to the provider on 11/29/21    Ashlyn Rodriguez LPN

## 2021-11-29 NOTE — TELEPHONE ENCOUNTER
Please call and assist the pt to schedule a follow up with the Tracey Beard, at next available.     Ashlyn Rodriguez LPN

## 2021-11-30 NOTE — TELEPHONE ENCOUNTER
EMT called patient and scheduled them for a video appointment on January 3rd @3pm.      Dedrick Whiting, EMT

## 2021-12-01 ENCOUNTER — THERAPY VISIT (OUTPATIENT)
Dept: PHYSICAL THERAPY | Facility: CLINIC | Age: 36
End: 2021-12-01
Payer: COMMERCIAL

## 2021-12-01 DIAGNOSIS — M54.50 CHRONIC BILATERAL LOW BACK PAIN WITHOUT SCIATICA: ICD-10-CM

## 2021-12-01 DIAGNOSIS — G89.29 CHRONIC BILATERAL LOW BACK PAIN WITHOUT SCIATICA: ICD-10-CM

## 2021-12-01 PROCEDURE — 97161 PT EVAL LOW COMPLEX 20 MIN: CPT | Mod: GP | Performed by: PHYSICAL THERAPIST

## 2021-12-01 PROCEDURE — 97112 NEUROMUSCULAR REEDUCATION: CPT | Mod: GP | Performed by: PHYSICAL THERAPIST

## 2021-12-01 PROCEDURE — 97110 THERAPEUTIC EXERCISES: CPT | Mod: GP | Performed by: PHYSICAL THERAPIST

## 2021-12-01 NOTE — PROGRESS NOTES
[facility]    OUTPATIENT Physical Therapy ORTHOPEDIC EVALUATION  PLAN OF TREATMENT FOR OUTPATIENT REHABILITATION  (COMPLETE FOR INITIAL CLAIMS ONLY)  Patient's Last Name, First Name, M.I.  YOB: 1985  Bella Fung    Provider s Name:  [facility]   Medical Record No.  [id number]   Start of Care Date:      Onset Date:   10/22/21 (MD)   Type:     _X__PT   ___OT Medical Diagnosis:    Encounter Diagnosis   Name Primary?     Chronic bilateral low back pain without sciatica         Treatment Diagnosis:  Chronic LBP        Goals:     12/01/21 0500   Body Part   Goals listed below are for Low back   Goal #1   Goal #1 bending   Current Functional Level Can bend until hands reach midlower leg   Performance level pain level 7-8/10   STG Target Performance Bend until hands reach midlower leg   Performance level pain level 3/10   Rationale for dressing LE;for bathing LE;for household tasks such as bedmaking, emptying diswasher, washer and dryer, washing floors   Due date 12/22/21   LTG Target Performance Unrestricted bending   Performance Level pain level 0-1/10   Rationale for dressing LE;for bathing LE;for household tasks such as bedmaking, emptying diswasher, washer and dryer, washing floors   Due date 01/12/22       Therapy Frequency:   1 x week for 6 weeks  Predicted Duration of Therapy Intervention:   6 weeks  Kelly Ayers, PT                 I CERTIFY THE NEED FOR THESE SERVICES FURNISHED UNDER        THIS PLAN OF TREATMENT AND WHILE UNDER MY CARE     (Physician co-signature of this document indicates review and certification of the therapy plan).                       Certification Date From:    12/1/21  Certification Date To:   1/15/22    Referring Provider:  Celi Beard  Initial Assessment        See Epic Evaluation

## 2021-12-01 NOTE — PROGRESS NOTES
Physical Therapy Initial Evaluation  Subjective:    Patient Health History  Bella Fung being seen for LBP.     Date of Onset: 2010.   Problem occurred: in retirement while lifting   Pain is reported as 8/10 on pain scale.  General health as reported by patient is fair.  Pertinent medical history includes: overweight, smoking, heart problems and depression.   Red flags:  None as reported by patient.  Medical allergies: none.   Surgeries include:  Other. Other surgery history details: 2 c- section, heart surgery.     Other medications details: tylenol.    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   Primary job tasks include:  Computer work, prolonged sitting and repetitive tasks.                  Therapist Generated HPI Evaluation  Problem details: MD order 10/22/21    Bella has been having LBP since 2010 when she was in retirement as she was doing a lot of lifting activities. She went to PCP when she was out of retirement in 2012. She was given medications which helped over the years. 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 She  Is given percocet and tramadol to get rid of pain, she also mentions she smokes a lot of weed to address the pain. She was send it PT for further management. .         Type of problem:  Lumbar.    This is a chronic condition.  Condition occurred with:  Repetition/overuse, lifting and bending.  Where condition occurred: at home and at work.  Patient reports pain:  Lower lumbar spine, lumbar spine right and lumbar spine left.  Pain is described as shooting and aching (throbbing) and is constant.  Pain radiates to:  No radiation. Pain is worse during the day.  Since onset symptoms are unchanged.  Associated symptoms:  Loss of motion/stiffness and loss of strength. Symptoms are exacerbated by bending, standing, sitting, walking, lifting, carrying and twisting  Relieved by: percocet, tramadol.      Restrictions due to  condition include:  Working in normal job without restrictions.  Barriers include:  None as reported by patient.                        Objective:  System         Lumbar/SI Evaluation  ROM:    AROM Lumbar:   Flexion:            Till midlower leg pain level 7/10  Ext:                    WFL pain level 7/10   Side Bend:        Left:  Mid thigh pain level 5/10    Right:  Mid thigh pain level 7/10  Rotation:           Left:     Right:  WFL pain level 3/10  Side Glide:        Left:  WFL pain level 3/10    Right:           Lumbar Myotomes:  normal            Lumbar DTR's:  normal        Lumbar Dermtomes:  normal                Neural Tension/Mobility:  Lumbar:  Normal        Lumbar Palpation:    Tenderness present at Left:    Erector Spinae; Piriformis and PSIS  Tenderness present at Right: Erector Spinae; Piriformis and PSIS    Lumbar Provocation:  normal      Spinal Segmental Conclusions:     Level: FRS right noted at L4, L5, L3 and S1                                                     Joana Lumbar Evaluation    Posture:  Sitting: fair  Standing: fair  Lordosis: Accentuated  Lateral Shift: no  Correction of Posture: better    Movement Loss:  Flexion (Flex): min and pain  Extension (EXT): pain  Side Glide R (SG R): min and pain  Side Glide L (SG L): pain  Test Movements:  FIS: During: increases  After: no worse    Repeat FIS: During: increases  After: worse    EIS: During: increases  After: no worse    Repeat EIS: During: increases  After: worse              Conclusion: posture and dysfunction  Principle of Treatment:  Posture Correction: sitting with back support advised  Flexion: avoided   Extension: encouraged to neutral durign work hours                                           ROS    Assessment/Plan:    Patient is a 36 year old female with lumbar complaints.    Patient has the following significant findings with corresponding treatment plan.                Diagnosis 1:  Chronic LBP  Pain -  hot/cold therapy,  US, electric stimulation, manual therapy, STS, self management, education and home program  Decreased ROM/flexibility - manual therapy, therapeutic exercise, therapeutic activity and home program  Decreased strength - therapeutic exercise, therapeutic activities and home program  Inflammation - cold therapy, US, electric stimulation and self management/home program  Impaired gait - gait training and home program  Decreased function - therapeutic activities and home program  Impaired posture - neuro re-education, therapeutic activities and home program    Therapy Evaluation Codes:   1) History comprised of:   Personal factors that impact the plan of care:      Past/current experiences and Time since onset of symptoms.    Comorbidity factors that impact the plan of care are:      check HPI.     Medications impacting care: check HPI.  2) Examination of Body Systems comprised of:   Body structures and functions that impact the plan of care:      Lumbar spine.   Activity limitations that impact the plan of care are:      Bending, Driving, Dressing, Lifting, Standing, Walking and Working.  3) Clinical presentation characteristics are:   Stable/Uncomplicated.  4) Decision-Making    Low complexity using standardized patient assessment instrument and/or measureable assessment of functional outcome.  Cumulative Therapy Evaluation is: Low complexity.    Previous and current functional limitations:  (See Goal Flow Sheet for this information)    Short term and Long term goals: (See Goal Flow Sheet for this information)     Communication ability:  Patient appears to be able to clearly communicate and understand verbal and written communication and follow directions correctly.  Treatment Explanation - The following has been discussed with the patient:   RX ordered/plan of care  Anticipated outcomes  Possible risks and side effects  This patient would benefit from PT intervention to resume normal activities.   Rehab potential is  good.    Frequency:  1 X week, once daily  Duration:  for 6 weeks  Discharge Plan:  Achieve all LTG.  Independent in home treatment program.  Reach maximal therapeutic benefit.    Please refer to the daily flowsheet for treatment today, total treatment time and time spent performing 1:1 timed codes.

## 2021-12-01 NOTE — PROGRESS NOTES
[facility]    OUTPATIENT Physical Therapy ORTHOPEDIC EVALUATION  PLAN OF TREATMENT FOR OUTPATIENT REHABILITATION  (COMPLETE FOR INITIAL CLAIMS ONLY)  Patient's Last Name, First Name, M.I.  YOB: 1985  Bella Fung    Provider s Name:  [facility]   Medical Record No.  [id number]   Start of Care Date:      Onset Date:   10/22/21 (MD)   Type:     _X__PT   ___OT Medical Diagnosis:  No diagnosis found.     Treatment Diagnosis:  Chronic LBP        Goals:     12/01/21 0500   Body Part   Goals listed below are for Low back   Goal #1   Goal #1 bending   Current Functional Level Can bend until hands reach midlower leg   Performance level pain level 7-8/10   STG Target Performance Bend until hands reach midlower leg   Performance level pain level 3/10   Rationale for dressing LE;for bathing LE;for household tasks such as bedmaking, emptying diswasher, washer and dryer, washing floors   Due date 12/22/21   LTG Target Performance Unrestricted bending   Performance Level pain level 0-1/10   Rationale for dressing LE;for bathing LE;for household tasks such as bedmaking, emptying diswasher, washer and dryer, washing floors   Due date 01/12/22       Therapy Frequency:     Predicted Duration of Therapy Intervention:       Kelly Ayers, PT                 I CERTIFY THE NEED FOR THESE SERVICES FURNISHED UNDER        THIS PLAN OF TREATMENT AND WHILE UNDER MY CARE     (Physician co-signature of this document indicates review and certification of the therapy plan).                       Certification Date From:      Certification Date To:       Referring Provider:  Celi Beard    Initial Assessment        See Epic Evaluation

## 2021-12-01 NOTE — PROGRESS NOTES
[facility]    OUTPATIENT Physical Therapy ORTHOPEDIC EVALUATION  PLAN OF TREATMENT FOR OUTPATIENT REHABILITATION  (COMPLETE FOR INITIAL CLAIMS ONLY)  Patient's Last Name, First Name, M.I.  YOB: 1985  Bella Fung    Provider s Name:  [facility]   Medical Record No.  [id number]   Start of Care Date:      Onset Date:   10/22/21 (MD)   Type:     _X__PT   ___OT Medical Diagnosis:    Encounter Diagnosis   Name Primary?     Chronic bilateral low back pain without sciatica         Treatment Diagnosis:  Chronic LBP        Goals:     12/01/21 0500   Body Part   Goals listed below are for Low back   Goal #1   Goal #1 bending   Current Functional Level Can bend until hands reach midlower leg   Performance level pain level 7-8/10   STG Target Performance Bend until hands reach midlower leg   Performance level pain level 3/10   Rationale for dressing LE;for bathing LE;for household tasks such as bedmaking, emptying diswasher, washer and dryer, washing floors   Due date 12/22/21   LTG Target Performance Unrestricted bending   Performance Level pain level 0-1/10   Rationale for dressing LE;for bathing LE;for household tasks such as bedmaking, emptying diswasher, washer and dryer, washing floors   Due date 01/12/22       Therapy Frequency:     Predicted Duration of Therapy Intervention:       Kelly Ayers, PT                 I CERTIFY THE NEED FOR THESE SERVICES FURNISHED UNDER        THIS PLAN OF TREATMENT AND WHILE UNDER MY CARE     (Physician co-signature of this document indicates review and certification of the therapy plan).                       Certification Date From:      Certification Date To:       Referring Provider:  Celi Beard    Initial Assessment        See Epic Evaluation

## 2021-12-19 ENCOUNTER — HEALTH MAINTENANCE LETTER (OUTPATIENT)
Age: 36
End: 2021-12-19

## 2021-12-22 ENCOUNTER — TELEPHONE (OUTPATIENT)
Dept: ANESTHESIOLOGY | Facility: CLINIC | Age: 36
End: 2021-12-22
Payer: COMMERCIAL

## 2021-12-22 DIAGNOSIS — G89.4 CHRONIC PAIN SYNDROME: ICD-10-CM

## 2021-12-22 RX ORDER — OXYCODONE AND ACETAMINOPHEN 5; 325 MG/1; MG/1
1 TABLET ORAL EVERY 6 HOURS PRN
Qty: 60 TABLET | Refills: 0 | Status: SHIPPED | OUTPATIENT
Start: 2021-12-31 | End: 2022-01-19

## 2021-12-22 NOTE — TELEPHONE ENCOUNTER
Refill request sent to the clinic on 12/21/21    Medication:  oxyCODONE-acetaminophen (PERCOCET) 5-325 MG tablet    [place]NPMP Checked: Yes    Last refilled 12/1/21 for 75 tablets      Last clinic appointment: 10/22/21  Next clinic apointment: 1/3/22    Last Drug Screen Collected:  4/1/21  Controlled Substance Agreement signed: 4/1/21      Preferred pharmacy:    [place], [place] - [address] [place] [address] [id number]      Per the providers last clinic note:   1. Bella reports very good pain and sleep benefit with recreational marijuana. She is interested in medical cannabis and meets criteria for intractable pain. For this reason I have certified her through the [place] Department of [employer] Medical Cannabis division online. Once started, stop smoking recreational marijuana.  2. Bella reports very good pain benefit with oxycodone and Tramadol and notes these medications allow her to be more active. She works 3 jobs and is clearly functioning well. No aberrant behaviors. We discussed opioids are not a great option for long term management of chronic pain and I would recommend a gradual taper off. She is open to this. We will discontinue Tramadol first per her request. We will increase Percocet 5/325mg to max of 3 tabs a day (from 2 tabs a day). Refilled with start date of 10/30.  We will likely reduce from 90 tabs/mo to 75 tabs/mo with the next prescription and so on until off.    Refill request routed to the provider on: 12/22/21.    Ashlyn Rodriguez LPN

## 2021-12-22 NOTE — TELEPHONE ENCOUNTER
Signed Prescriptions:                        Disp   Refills    oxyCODONE-acetaminophen (PERCOCET) 5-325 M*60 tab*0        Sig: Take 1 tablet by mouth every 6 hours as needed for           severe pain Max of 2 tabs/day.  Authorizing Provider: VERO AMOS      Reviewed [place] Prescription Monitoring Program, appears appropriate.     I reduced prescription from 75 tabs/mo down to 60 tabs/mo per plan. Max of 2 tabs/day.    ZACH Jenkins [facility]

## 2021-12-23 ENCOUNTER — TELEPHONE (OUTPATIENT)
Dept: ANESTHESIOLOGY | Facility: CLINIC | Age: 36
End: 2021-12-23
Payer: COMMERCIAL

## 2021-12-31 NOTE — PROGRESS NOTES
[facility]     Date of visit: 1/3/2022      Assessment:   The patient is a 36 year old female with PMHx of asthma, HIV/AIDS and chronic low back pain who returns to clinic today via video visit for continued management of chronic low back pain.    1) Low back pain:  As noted at her previous evaluation, this is likely due to myofascial pain with some component of underlying facet mediated pain.       Visit Diagnoses:  1. Chronic bilateral low back pain without sciatica    2. Chronic pain syndrome        Plan:    Therapies:   1. Physical Therapy: rehab physical therapy was too aggressive. We will try chronic pain physical therapy- a more relaxed and gentler form of physical therapy. She is interested in, ordered today. Schedule first visit with Markus Duvall PT ([place]) and have visits every other week.   2. Clinical Health Psychologist to address issues of relaxation, behavioral change, coping style, and other factors important to improvement: not at this time.   Medication Management:   1. Certified for medical cannabis but never able to start as she had trouble with the application. I will ask the [facility] Cann[facility] to mail the application given difficulties.   2. Bella reports very good pain benefit with oxycodone  and notes this medication allows her to be more active. That being said, I do not recommend for long term management. So far- Tramadol was discontinued in September and Percocet reduced from 3 tabs/day to 2 tabs/day this month. Our plan was to supplement with medical cannabis while working on taper off of opioids but she hasn't been able to start. We will continue Percocet 5/325mg two tabs a day for the next 2 months to allow her time for thiss. We will plan to continue gradual taper off monthly as tolerated. She absolutely cannot overuse this medication or I will not be able to continue prescribing.    3. We may consider repeat trial of Cymbalta but  at an increased dose in the future.    Further procedures recommended:   3. We do not need to discuss further if not interested.  Other: consider a trial of acupuncture.     Follow up: with Tracey SERRANO CNP in 6 weeks.         Tracey SERRANO CNP  [facility]     -------------------------------------------------    Subjective:    Chief complaint:   Chief Complaint   Patient presents with     RECHECK     UMP RETURN,RM 14, patient reports, 9/10 pain in her lower back       Interval history:  Bella Fung is a 36 year old female last seen on 7/29/2021.  She is a patient of Dr. Rosenbaum seen in follow up.     Recommendations/plan at the last visit included:  Therapies:   4. Physical Therapy: I highly recommend this resource as first line treatment for low back pain and it does not sounds like she has tried previously. She will start with Rehab physical therapy as he schedule allows with visits weekly or every other week. Encouraged she work on gradually developing a manageable home exercise program. If this is not well tolerated may consider pain physical therapy.   5. Clinical Health Psychologist to address issues of relaxation, behavioral change, coping style, and other factors important to improvement: not at this time.   Medication Management:   1. Bella reports very good pain and sleep benefit with recreational marijuana. She is interested in medical cannabis and meets criteria for intractable pain. For this reason I have certified her through the [place] Department of [facility] Medical Cannabis division online. Once started, stop smoking recreational marijuana.  2. Bella reports very good pain benefit with oxycodone and Tramadol and notes these medications allow her to be more active. She works 3 jobs and is clearly functioning well. No aberrant behaviors. We discussed opioids are not a great option for long term management of chronic pain and I would recommend a gradual taper off.  She is open to this. We will discontinue Tramadol first per her request. We will increase Percocet 5/325mg to max of 3 tabs a day (from 2 tabs a day). Refilled with start date of 10/30.  We will likely reduce from 90 tabs/mo to 75 tabs/mo with the next prescription and so on until off.  3. Consider repeat trial of Cymbalta but at an increased dose. We will discuss at next visit.      Further procedures recommended:   6. We do not need to discuss further if not interested.  Other: consider a trial of acupuncture.     Follow up: with Tracey SERRANO CNP or Dr. Rosenbaum in 4 weeks    Since her last visit, Bella WARNER Antonieta reports:  -Her pain is worse than it was at last visit.   -At last visit her Tramadol was discontinued and her Percocet was increased. Her Percocet has then been reduced, most recently to 60 tabs/mo. She has struggled with this. She admits that she has been taking more of this medication than is prescribed, sometimes 3-4 tabs/day. She ran out of this medication 1.5 weeks ago.    -SHe was certified for medical cannabis at last visit, unfortunately she hasn't been able to start it yet because she has had difficulty with the application.   -She continues working two different jobs, cut back on one job.   -She is not interested in Cymbalta right now, states she takes enough medications.      HPI per Dr. Rosenbaum :  The patient is a 34 year old female with past medical history of asthma, HIV/AIDS and chronic low back pain who presents for evaluation of low back pain.  The patient's pain began about 8 years ago without any particular precipitating event and has been fluctuant since that time.  She has been having more frequent episodes than she has in the past.  She reports that her pain is located primarily in the central low back and radiates to both legs.  The patient describes the pain as throbbing.  She reports that the pain is made worse by standing and sitting at work.  Her pain is improved with  warm baths.  She denies any lower extremity symptoms associated with her pain.  She has issues with noctiuria and daytime urge incontinence which started about a year ago and have been getting slowly and progressively worse.  She denies any new problems with falls or balance, any new numbness or weakness of the arms or legs, any new bowel or bladder incontinence, or any sudden or unexpected weight loss.  The patient's pain is most severe during greater activity which is typically at night.  she rates her average pain score at 8/10, but it can be as low as 5/10 or as severe as 10/10.        Pain Information:   Pain rating: averages 9/10 on a 0-10 scale.      Current pain medications:   - Ibuprofen 600 mg QID PRN  - Percocet 1-2 tablets Q6h PRN, #60 for 30 days    Current MME: 25    THE 4 A's OF OPIOID MAINTENANCE ANALGESIA     Analgesia: Reports improved pain control with doses of medication     Activity: She has greater tolerance for activity at work and at home     Adverse effects: denies      Review of [place] Prescription Monitoring Program (): No concern for abuse or misuse of controlled medications based on this report.     Annual Controlled Substance Agreement/UDS due date: April of 2022    Anti-convulsants: Not tried  Muscle relaxors: Not tried  Anti-depressants: Cymbalta -?  NSAIDs: Currently using Ibuprofen with moderate benefit, Mobic- ?  Acetaminophen: Currently using in combination therapy  Topicals: Not tried  Opioids: Tramadol and oxycodone currently     Other treatments have included:  Physical therapy: Not tried  Pain Psychology: Not tried  Chiropractic: Not tried  Acupuncture: Not tried  TENs Unit: Not tried    Medications:  Current Outpatient Medications   Medication Sig Dispense Refill     bictegravir-emtricitabine-tenofovir (BIKTARVY) -25 MG per tablet Take 1 tablet by mouth daily 90 tablet 0     methocarbamol (ROBAXIN) 750 MG tablet Take 1 tablet (750 mg) by mouth 4 times daily as  needed for muscle spasms 90 tablet 1     naloxone (NARCAN) 4 MG/0.1ML nasal spray Spray 1 spray (4 mg) into one nostril alternating nostrils once as needed for opioid reversal every 2-3 minutes until assistance arrives 0.2 mL 0     oxyCODONE-acetaminophen (PERCOCET) 5-325 MG tablet Take 1 tablet by mouth every 6 hours as needed for severe pain Max of 2 tabs/day. 60 tablet 0     sulfamethoxazole-trimethoprim (BACTRIM) 400-80 MG tablet Take 1 tablet by mouth daily 90 tablet 0       Medical History: any changes in medical history since they were last seen? No    Review of Systems: A 10-point review of systems was negative, with the exception of chronic pain issues.      Objective:    Physical Exam:  Blood pressure 125/74, pulse 63, SpO2 100 %, not currently breastfeeding.  Constitutional: Well developed, well nourished, appears stated age. Obese.  HEENT: Head atraumatic, normocephalic. Eyes without conjunctival injection or jaundice. Oropharynx clear. Neck supple. No obvious neck masses.  Skin: No rash, lesions, or petechiae of exposed skin.   Psychiatric/mental status: Alert, without lethargy or stupor. Speech fluent. Appropriate affect. Mood normal. Able to follow commands without difficulty.     BILLING TIME DOCUMENTATION:   The total TIME spent on this patient on the date of the encounter/appointment was 29 minutes.      TOTAL TIME includes:   Time spent preparing to see the patient (reviewing records and tests)   Time spent face to face (or over the phone) with the patient   Time spent ordering tests, medications, procedures and referrals   Time spent Referring and communicating with other healthcare professionals   Time spent documenting clinical information in Epic

## 2022-01-03 ENCOUNTER — OFFICE VISIT (OUTPATIENT)
Dept: ANESTHESIOLOGY | Facility: CLINIC | Age: 37
End: 2022-01-03
Payer: COMMERCIAL

## 2022-01-03 VITALS — SYSTOLIC BLOOD PRESSURE: 125 MMHG | HEART RATE: 63 BPM | OXYGEN SATURATION: 100 % | DIASTOLIC BLOOD PRESSURE: 74 MMHG

## 2022-01-03 DIAGNOSIS — G89.4 CHRONIC PAIN SYNDROME: ICD-10-CM

## 2022-01-03 DIAGNOSIS — G89.29 CHRONIC BILATERAL LOW BACK PAIN WITHOUT SCIATICA: Primary | ICD-10-CM

## 2022-01-03 DIAGNOSIS — M54.50 CHRONIC BILATERAL LOW BACK PAIN WITHOUT SCIATICA: Primary | ICD-10-CM

## 2022-01-03 PROCEDURE — 99213 OFFICE O/P EST LOW 20 MIN: CPT | Performed by: NURSE PRACTITIONER

## 2022-01-03 ASSESSMENT — PAIN SCALES - GENERAL: PAINLEVEL: EXTREME PAIN (9)

## 2022-01-03 NOTE — NURSING NOTE
Patient presents with:  RECHECK: UMP RETURN,RM 14, patient reports, 9/10 pain in her lower back      Extreme Pain (9)     Pain Medications     Opioid Combinations Refills Start End     oxyCODONE-acetaminophen (PERCOCET) 5-325 MG tablet    0 12/31/2021     Sig - Route: Take 1 tablet by mouth every 6 hours as needed for severe pain Max of 2 tabs/day. - Oral    Class: E-Prescribe    Earliest Fill Date: 12/30/2021          What medications are you using for pain?   Percocet,cannabis        (Return Patients only) What refills are you needing today? Percocet     Dedrick Whiting, EMT

## 2022-01-03 NOTE — PATIENT INSTRUCTIONS
Therapies:   1. Physical Therapy: chronic pain physical therapy ordered today. Schedule first visit with Markus Duvall PT ([place]) and have visits every other week.   2. Clinical Health Psychologist to address issues of relaxation, behavioral change, coping style, and other factors important to improvement: not at this time.   Medication Management:   1. I will ask the [place] Department of [facility] Medical Cannabis to mail you the application. Call to ask if this can be faxed. [phone]  2. Continue Percocet 5/325mg two tabs a day for the next 2 months. We will plan to continue gradual taper off monthly as tolerated. Reduce use as able with medical cannabis.   3. Consider repeat trial of Cymbalta but at an increased dose.    Further procedures recommended:   3. We do not need to discuss further if not interested.  Other: consider a trial of acupuncture.     Follow up: with Tracey SERRANO CNP in 6 weeks.

## 2022-01-03 NOTE — LETTER
Date:January 18, 2022      Patient was self referred, no letter generated. Do not send.        [facility] Health Information

## 2022-01-03 NOTE — LETTER
1/3/2022       RE: Bella Fung  [address]  [address]  [facility] [place] [address]     Dear Colleague,    Thank you for referring your patient, Bella Fung, to the [facility] CLINIC FOR COMPREHENSIVE PAIN MANAGEMENT [place] at [facility]. Please see a copy of my visit note below.        [facility]     Date of visit: 1/3/2022      Assessment:   The patient is a 36 year old female with PMHx of asthma, HIV/AIDS and chronic low back pain who returns to clinic today via video visit for continued management of chronic low back pain.    1) Low back pain:  As noted at her previous evaluation, this is likely due to myofascial pain with some component of underlying facet mediated pain.       Visit Diagnoses:  1. Chronic bilateral low back pain without sciatica    2. Chronic pain syndrome        Plan:    Therapies:   1. Physical Therapy: rehab physical therapy was too aggressive. We will try chronic pain physical therapy- a more relaxed and gentler form of physical therapy. She is interested in, ordered today. Schedule first visit with Maruks Duvall PT ([place]) and have visits every other week.   2. Clinical Health Psychologist to address issues of relaxation, behavioral change, coping style, and other factors important to improvement: not at this time.   Medication Management:   1. Certified for medical cannabis but never able to start as she had trouble with the application. I will ask the [place] Department [employer] Medical Cannabis to mail the application given difficulties.   2. Bella reports very good pain benefit with oxycodone  and notes this medication allows her to be more active. That being said, I do not recommend for long term management. So far- Tramadol was discontinued in September and Percocet reduced from 3 tabs/day to 2 tabs/day this month. Our plan was to supplement with medical cannabis while working on taper off of opioids  but she hasn't been able to start. We will continue Percocet 5/325mg two tabs a day for the next 2 months to allow her time for thiss. We will plan to continue gradual taper off monthly as tolerated. She absolutely cannot overuse this medication or I will not be able to continue prescribing.    3. We may consider repeat trial of Cymbalta but at an increased dose in the future.    Further procedures recommended:   3. We do not need to discuss further if not interested.  Other: consider a trial of acupuncture.     Follow up: with Tracey SERRANO CNP in 6 weeks.         Tracey SERRANO CNP  [facility]     -------------------------------------------------    Subjective:    Chief complaint:   Chief Complaint   Patient presents with     RECHECK     UMP RETURN,RM 14, patient reports, 9/10 pain in her lower back       Interval history:  Bella Fung is a 36 year old female last seen on 7/29/2021.  She is a patient of Dr. Rosenbaum seen in follow up.     Recommendations/plan at the last visit included:  Therapies:   4. Physical Therapy: I highly recommend this resource as first line treatment for low back pain and it does not sounds like she has tried previously. She will start with Rehab physical therapy as he schedule allows with visits weekly or every other week. Encouraged she work on gradually developing a manageable home exercise program. If this is not well tolerated may consider pain physical therapy.   5. Clinical Health Psychologist to address issues of relaxation, behavioral change, coping style, and other factors important to improvement: not at this time.   Medication Management:   1. Bella reports very good pain and sleep benefit with recreational marijuana. She is interested in medical cannabis and meets criteria for intractable pain. For this reason I have certified her through the [facility] Medical Cannabis division online. Once started, stop smoking  recreational marijuana.  2. Bella reports very good pain benefit with oxycodone and Tramadol and notes these medications allow her to be more active. She works 3 jobs and is clearly functioning well. No aberrant behaviors. We discussed opioids are not a great option for long term management of chronic pain and I would recommend a gradual taper off. She is open to this. We will discontinue Tramadol first per her request. We will increase Percocet 5/325mg to max of 3 tabs a day (from 2 tabs a day). Refilled with start date of 10/30.  We will likely reduce from 90 tabs/mo to 75 tabs/mo with the next prescription and so on until off.  3. Consider repeat trial of Cymbalta but at an increased dose. We will discuss at next visit.      Further procedures recommended:   6. We do not need to discuss further if not interested.  Other: consider a trial of acupuncture.     Follow up: with Tracey SERRANO CNP or Dr. Rosenbaum in 4 weeks    Since her last visit, Bella WARNER Antonieta reports:  -Her pain is worse than it was at last visit.   -At last visit her Tramadol was discontinued and her Percocet was increased. Her Percocet has then been reduced, most recently to 60 tabs/mo. She has struggled with this. She admits that she has been taking more of this medication than is prescribed, sometimes 3-4 tabs/day. She ran out of this medication 1.5 weeks ago.    -SHe was certified for medical cannabis at last visit, unfortunately she hasn't been able to start it yet because she has had difficulty with the application.   -She continues working two different jobs, cut back on one job.   -She is not interested in Cymbalta right now, states she takes enough medications.      HPI per Dr. Rosenbaum :  The patient is a 34 year old female with past medical history of asthma, HIV/AIDS and chronic low back pain who presents for evaluation of low back pain.  The patient's pain began about 8 years ago without any particular precipitating event and  has been fluctuant since that time.  She has been having more frequent episodes than she has in the past.  She reports that her pain is located primarily in the central low back and radiates to both legs.  The patient describes the pain as throbbing.  She reports that the pain is made worse by standing and sitting at work.  Her pain is improved with warm baths.  She denies any lower extremity symptoms associated with her pain.  She has issues with noctiuria and daytime urge incontinence which started about a year ago and have been getting slowly and progressively worse.  She denies any new problems with falls or balance, any new numbness or weakness of the arms or legs, any new bowel or bladder incontinence, or any sudden or unexpected weight loss.  The patient's pain is most severe during greater activity which is typically at night.  she rates her average pain score at 8/10, but it can be as low as 5/10 or as severe as 10/10.        Pain Information:   Pain rating: averages 9/10 on a 0-10 scale.      Current pain medications:   - Ibuprofen 600 mg QID PRN  - Percocet 1-2 tablets Q6h PRN, #60 for 30 days    Current MME: 25    THE 4 A's OF OPIOID MAINTENANCE ANALGESIA     Analgesia: Reports improved pain control with doses of medication     Activity: She has greater tolerance for activity at work and at home     Adverse effects: denies      Review of [place] Prescription Monitoring Program (): No concern for abuse or misuse of controlled medications based on this report.     Annual Controlled Substance Agreement/UDS due date: April of 2022    Anti-convulsants: Not tried  Muscle relaxors: Not tried  Anti-depressants: Cymbalta -?  NSAIDs: Currently using Ibuprofen with moderate benefit, Mobic- ?  Acetaminophen: Currently using in combination therapy  Topicals: Not tried  Opioids: Tramadol and oxycodone currently     Other treatments have included:  Physical therapy: Not tried  Pain Psychology: Not  tried  Chiropractic: Not tried  Acupuncture: Not tried  TENs Unit: Not tried    Medications:  Current Outpatient Medications   Medication Sig Dispense Refill     bictegravir-emtricitabine-tenofovir (BIKTARVY) -25 MG per tablet Take 1 tablet by mouth daily 90 tablet 0     methocarbamol (ROBAXIN) 750 MG tablet Take 1 tablet (750 mg) by mouth 4 times daily as needed for muscle spasms 90 tablet 1     naloxone (NARCAN) 4 MG/0.1ML nasal spray Spray 1 spray (4 mg) into one nostril alternating nostrils once as needed for opioid reversal every 2-3 minutes until assistance arrives 0.2 mL 0     oxyCODONE-acetaminophen (PERCOCET) 5-325 MG tablet Take 1 tablet by mouth every 6 hours as needed for severe pain Max of 2 tabs/day. 60 tablet 0     sulfamethoxazole-trimethoprim (BACTRIM) 400-80 MG tablet Take 1 tablet by mouth daily 90 tablet 0       Medical History: any changes in medical history since they were last seen? No    Review of Systems: A 10-point review of systems was negative, with the exception of chronic pain issues.      Objective:    Physical Exam:  Blood pressure 125/74, pulse 63, SpO2 100 %, not currently breastfeeding.  Constitutional: Well developed, well nourished, appears stated age. Obese.  HEENT: Head atraumatic, normocephalic. Eyes without conjunctival injection or jaundice. Oropharynx clear. Neck supple. No obvious neck masses.  Skin: No rash, lesions, or petechiae of exposed skin.   Psychiatric/mental status: Alert, without lethargy or stupor. Speech fluent. Appropriate affect. Mood normal. Able to follow commands without difficulty.     BILLING TIME DOCUMENTATION:   The total TIME spent on this patient on the date of the encounter/appointment was 29 minutes.      TOTAL TIME includes:   Time spent preparing to see the patient (reviewing records and tests)   Time spent face to face (or over the phone) with the patient   Time spent ordering tests, medications, procedures and referrals   Time spent  Referring and communicating with other healthcare professionals   Time spent documenting clinical information in Epic             Again, thank you for allowing me to participate in the care of your patient.      Sincerely,    ZAHC Viera CNP

## 2022-01-19 ENCOUNTER — MYC REFILL (OUTPATIENT)
Dept: ANESTHESIOLOGY | Facility: CLINIC | Age: 37
End: 2022-01-19
Payer: COMMERCIAL

## 2022-01-19 DIAGNOSIS — G89.4 CHRONIC PAIN SYNDROME: ICD-10-CM

## 2022-01-20 RX ORDER — OXYCODONE AND ACETAMINOPHEN 5; 325 MG/1; MG/1
1 TABLET ORAL EVERY 6 HOURS PRN
Qty: 60 TABLET | Refills: 0 | Status: SHIPPED | OUTPATIENT
Start: 2022-01-29 | End: 2022-02-22

## 2022-01-20 NOTE — TELEPHONE ENCOUNTER
Signed Prescriptions:                        Disp   Refills    oxyCODONE-acetaminophen (PERCOCET) 5-325 M*60 tab*0        Sig: Take 1 tablet by mouth every 6 hours as needed for           severe pain Max of 2 tabs/day.  Authorizing Provider: VERO AMOS      Reviewed [place] Prescription Monitoring Program, appears appropriate.     Refilling again at 60 tabs/mo. Next month will reduce to 45 tabs per plan.     ZACH Jenkins [facility]

## 2022-01-20 NOTE — TELEPHONE ENCOUNTER
Refill request    Medication: oxyCODONE-acetaminophen (PERCOCET) 5-325 MG       Last clinic appointment: 1/3/22  Next clinic appointment: Not scheduled    Last Drug Screen Collected: 4/1/21  Controlled Substance Agreement signed: 5/6/21      Preferred pharmacy:      [place], [place] - [address] [place] [address] [phone]

## 2022-01-20 NOTE — TELEPHONE ENCOUNTER
Refills have been requested for the following medications:         oxyCODONE-acetaminophen (PERCOCET) 5-325 MG tablet [Celi Beard, APRN CNP]     Preferred pharmacy: [place], [place] - [address] [place] [address] [phone]

## 2022-02-20 ENCOUNTER — MYC REFILL (OUTPATIENT)
Dept: ANESTHESIOLOGY | Facility: CLINIC | Age: 37
End: 2022-02-20
Payer: COMMERCIAL

## 2022-02-20 DIAGNOSIS — G89.4 CHRONIC PAIN SYNDROME: ICD-10-CM

## 2022-02-21 RX ORDER — OXYCODONE AND ACETAMINOPHEN 5; 325 MG/1; MG/1
1 TABLET ORAL EVERY 6 HOURS PRN
Qty: 60 TABLET | Refills: 0 | Status: CANCELLED | OUTPATIENT
Start: 2022-02-21

## 2022-02-21 NOTE — TELEPHONE ENCOUNTER
Refills have been requested for the following medications:         oxyCODONE-acetaminophen (PERCOCET) 5-325 MG tablet [Celi Beard, APRN CNP]     Preferred pharmacy: [place], [place] - [address] [place] [address] [phone]

## 2022-02-21 NOTE — TELEPHONE ENCOUNTER
Received call from patient requesting refill(s) of oxyCODONE-acetaminophen (PERCOCET) 5-325 MG tablet     Last dispensed from pharmacy on 1/29/22    Patient's last office/virtual visit by prescribing provider on 1/3/22  Next office/virtual appointment scheduled for None     Last urine drug screen date 4/1/21  Current opioid agreement on file (completed within the last year) Yes Date of opioid agreement: 5/6/21    E-prescribe to [place], [place] pharmacy    Will route to nursing [place] for review and preparation of prescription(s).

## 2022-02-22 PROBLEM — M54.50 CHRONIC BILATERAL LOW BACK PAIN WITHOUT SCIATICA: Status: RESOLVED | Noted: 2021-12-01 | Resolved: 2022-02-22

## 2022-02-22 PROBLEM — G89.29 CHRONIC BILATERAL LOW BACK PAIN WITHOUT SCIATICA: Status: RESOLVED | Noted: 2021-12-01 | Resolved: 2022-02-22

## 2022-02-22 RX ORDER — OXYCODONE AND ACETAMINOPHEN 5; 325 MG/1; MG/1
1 TABLET ORAL EVERY 6 HOURS PRN
Qty: 60 TABLET | Refills: 0 | Status: CANCELLED | OUTPATIENT
Start: 2022-02-22

## 2022-02-22 NOTE — PROGRESS NOTES
Patient seen for one time evaluation and treatment.  Patient did not return for further treatment and current status is unknown.  Please see initial evaluation for further information.      Answers for HPI/ROS submitted by the patient on 12/1/2021  Number scale: 8/10  General health as reported by patient: fair  Medical allergies: none

## 2022-02-22 NOTE — TELEPHONE ENCOUNTER
Refill request    Medication:     oxyCODONE-acetaminophen (PERCOCET) 5-325 MG tablet      Last clinic appointment: 1/3/22  Next clinic appointment: Not Scheduled (Patient is overdue for 6 week follow up    Last Drug Screen Collected: 4/1/21  Controlled Substance Agreement signed: 4/1/21      Preferred pharmacy:    [facility] [place], [place]       Janae Estrada RN

## 2022-03-24 ENCOUNTER — MYC MEDICAL ADVICE (OUTPATIENT)
Dept: ANESTHESIOLOGY | Facility: CLINIC | Age: 37
End: 2022-03-24
Payer: COMMERCIAL

## 2022-04-10 ENCOUNTER — HEALTH MAINTENANCE LETTER (OUTPATIENT)
Age: 37
End: 2022-04-10

## 2022-07-31 ENCOUNTER — HEALTH MAINTENANCE LETTER (OUTPATIENT)
Age: 37
End: 2022-07-31

## 2022-10-15 ENCOUNTER — HEALTH MAINTENANCE LETTER (OUTPATIENT)
Age: 37
End: 2022-10-15

## 2023-05-27 ENCOUNTER — HEALTH MAINTENANCE LETTER (OUTPATIENT)
Age: 38
End: 2023-05-27

## 2023-12-15 NOTE — TELEPHONE ENCOUNTER
I have been trying to get in touch with Bella to see how she is doing and to remind her to come in for labs.  I left a detailed msg.  I will continue to work on getting in touch with her.     Ashley Noguera MD  
no fever and no chills.

## 2024-03-17 ENCOUNTER — HEALTH MAINTENANCE LETTER (OUTPATIENT)
Age: 39
End: 2024-03-17

## 2024-08-04 ENCOUNTER — HEALTH MAINTENANCE LETTER (OUTPATIENT)
Age: 39
End: 2024-08-04

## 2025-04-15 NOTE — TELEPHONE ENCOUNTER
Patient taken to CT scan     Setting call date for future fills.    Last filled: 07/02/20    Next Call Date: 07/30/20    Marlin Wise CPhT  [facility]  [phone]

## 2025-04-28 ENCOUNTER — TRANSFERRED RECORDS (OUTPATIENT)
Dept: MULTI SPECIALTY CLINIC | Facility: CLINIC | Age: 40
End: 2025-04-28

## 2025-04-28 LAB — PAP SMEAR - HIM PATIENT REPORTED: NEGATIVE

## 2025-05-29 ENCOUNTER — MEDICAL CORRESPONDENCE (OUTPATIENT)
Dept: HEALTH INFORMATION MANAGEMENT | Facility: CLINIC | Age: 40
End: 2025-05-29
Payer: COMMERCIAL

## 2025-06-05 ENCOUNTER — TRANSCRIBE ORDERS (OUTPATIENT)
Dept: OTHER | Age: 40
End: 2025-06-05

## 2025-06-05 ENCOUNTER — OFFICE VISIT (OUTPATIENT)
Dept: OBGYN | Facility: CLINIC | Age: 40
End: 2025-06-05
Payer: COMMERCIAL

## 2025-06-05 DIAGNOSIS — N93.9 ABNORMAL UTERINE BLEEDING: ICD-10-CM

## 2025-06-05 DIAGNOSIS — N92.0 MENORRHAGIA WITH REGULAR CYCLE: ICD-10-CM

## 2025-06-05 DIAGNOSIS — N92.0 MENORRHAGIA WITH REGULAR CYCLE: Primary | ICD-10-CM

## 2025-06-05 DIAGNOSIS — D25.1 INTRAMURAL AND SUBMUCOUS LEIOMYOMA OF UTERUS: Primary | ICD-10-CM

## 2025-06-05 DIAGNOSIS — D25.0 INTRAMURAL AND SUBMUCOUS LEIOMYOMA OF UTERUS: Primary | ICD-10-CM

## 2025-06-05 LAB
HGB BLD-MCNC: 8.5 G/DL (ref 11.7–15.7)
MCV RBC AUTO: 80 FL (ref 78–100)
TSH SERPL DL<=0.005 MIU/L-ACNC: 1.44 UIU/ML (ref 0.3–4.2)

## 2025-06-05 RX ORDER — NORETHINDRONE 5 MG/1
5 TABLET ORAL DAILY
COMMUNITY
Start: 2025-04-13

## 2025-06-05 RX ORDER — FERROUS SULFATE 325(65) MG
325 TABLET ORAL
COMMUNITY

## 2025-06-05 RX ORDER — IMIQUIMOD 12.5 MG/.25G
CREAM TOPICAL
COMMUNITY

## 2025-06-05 RX ORDER — OXYCODONE HYDROCHLORIDE 5 MG/1
5 TABLET ORAL EVERY 6 HOURS PRN
COMMUNITY
End: 2025-06-05

## 2025-06-05 NOTE — PATIENT INSTRUCTIONS
If you have any questions regarding your visit, Please contact your care team.    To Schedule an Appointment 24/7  Call: [phone]  Women s Health  TELEPHONE NUMBER   Chaim Black M.D.    Erika-Medical Assistant    Rachna Hernandez-Surgery Scheduler  [place]arcia- Tuesday-[place]                   7:30 a.m.-3:30 p.m.  Wednesday-[place]             8:00 a.m.-4:30 p.m.  Thursday-[place]     8:00 a.m.-4:00 p.m.  Friday-[place]                       7:30 a.m.-1:00 p.m. [facility]  [address] [place] [address].  [place] [address] [phone] Phone  [phone] Fax    Imaging Scheduling all locations  [phone]      [facility] Labor and Delivery  [address]  [place], [place] [address] [phone]    [facility]  [address] [place] [address] [phone] ask for Women's Clinic     **Surgeries** Our Surgery Schedulers will contact you to schedule. If you do not receive a call within 3 business days, please call [phone].    Urgent Care locations:  [facility] Monday-Friday                 10 am - 8 pm  Saturday and Sunday        9 am - 5 pm   [phone]   If you need a medication refill, please contact your pharmacy. Please allow 3 business days for your refill to be completed.  As always, Thank you for trusting us with your healthcare needs!  If you have any questions regarding your visit, Please contact your care team.    [employer] [facility]: [phone]    To Schedule an Appointment 24/7  Call: [phone]    see additional instructions from your care team below

## 2025-06-05 NOTE — Clinical Note
I have viewed this through the patient's [employer] portal on her phone. 04/28/2025 Pap smear NIL HR HPV negative

## 2025-06-05 NOTE — PROGRESS NOTES
Bella is a 40 year old  referred here by Dr. Aguilar, with complaint of abnormal uterine bleeding.    She had been on Aygestin for about a year.  She takes this medication daily.    She had COVID in March and she was admitted to [facility].  In early April she started bleeding once she was released from the Hospital.  And she needed 1 unit of packed RBCs.  Around the  or , she started bleeding.     She was seen by FP on .   She was not bleeding heavy, so she did not start medications.  When she started having heavier bleeding in May, she started TXA but she does not feel that it helped.  She has another appointment next week, but she is out of the office until then.    She had her hgb at 8.5 on May 20.    She has been fatigued.  She has not had dizziness.    She has not had an ultrasound performed this year.  She had one in March, 2024, and through her MyChart on her phone.  She has a posterior fundal submucosal fibroid measuring 5.7 cm with associated distortion of the otherwise unremarkable appearing endometrium.  The left ovary unremarkable.  The right ovary was not identified.        Past Medical History:   Diagnosis Date    Chronic pain     Diabetes (H) 2019     Never on therapy.     HIV (human immunodeficiency virus infection) (H)     Diagnosed . Initially followed at [facility]. Opportunistic Infections: None. HLA  Status: Negative . Historical use of ARVs: Truvada, Reyataz, and Norvir from  until 2013; switched atazanavir to darunavir 2013; emtricitabine-tenofovir and darunavir-cobicistat 10/2014- around , Descovy and Prezcobix in , then ~ 2017 to Descovy and Dolutegravir. Adherence very poor since       Past Surgical History:   Procedure Laterality Date    C/SECTION, LOW TRANSVERSE       SECTION, TUBAL LIGATION, COMBINED         OB History    Para Term  AB Living   2 0 0 0 0 0   SAB IAB Ectopic Multiple Live Births   0 0 0 0 2      #  Outcome Date GA Lbr Arun/2nd Weight Sex Type Anes PTL Lv   2       CS-LTranv      1       CS-LTranv          Gynecological History            No LMP recorded.         See above HPI        No Known Allergies      Current Outpatient Medications   Medication Sig Dispense Refill    bictegravir-emtricitabine-tenofovir (BIKTARVY) -25 MG per tablet Take 1 tablet by mouth daily 90 tablet 0    ferrous sulfate (FEROSUL) 325 (65 Fe) MG tablet Take 325 mg by mouth daily (with breakfast).      imiquimod (ALDARA) 5 % external cream Apply topically three times a week.      naloxone (NARCAN) 4 MG/0.1ML nasal spray Spray 1 spray (4 mg) into one nostril alternating nostrils once as needed for opioid reversal every 2-3 minutes until assistance arrives 0.2 mL 0    norethindrone (AYGESTIN) 5 MG tablet Take 5 mg by mouth daily.      sulfamethoxazole-trimethoprim (BACTRIM) 400-80 MG tablet Take 1 tablet by mouth daily 90 tablet 0       Social History     Socioeconomic History    Marital status:      Spouse name: Not on file    Number of children: Not on file    Years of education: Not on file    Highest education level: Not on file   Occupational History    Not on file   Tobacco Use    Smoking status: Every Day     Types: Other, Cigarettes    Smokeless tobacco: Never    Tobacco comments:     smokes marijuana   Substance and Sexual Activity    Alcohol use: Not Currently    Drug use: Yes     Types: Marijuana     Comment: Daily.     Sexual activity: Not on file   Other Topics Concern    Parent/sibling w/ CABG, MI or angioplasty before 65F 55M? Not Asked   Social History Narrative    Not on file     Social Drivers of Health     Financial Resource Strain: Not on File (2024)    Received from [employer]    Financial Resource Strain     Financial Resource Strain: 0   Food Insecurity: Not on File (2024)    Received from [employer]    Food Insecurity     Food: 0   Transportation Needs: Not on File (2024)    Received  from [employer]    Transportation Needs     Transportation: 0   Physical Activity: Not on File (2024)    Received from [employer]    Physical Activity     Physical Activity: 0   Stress: Not on File (2024)    Received from [employer]    Stress     Stress: 0   Social Connections: Not on File (9/15/2024)    Received from [employer]    Social Connections     Connectedness: 0   Interpersonal Safety: Not on file   Housing Stability: Not on File (2024)    Received from [employer]    Housing Stability     Housin       Family History   Problem Relation Age of Onset    Glaucoma Paternal Grandmother          Review of Systems:  10 point ROS of systems including Constitutional, Eyes, Respiratory, Cardiovascular, Gastroenterology, Genitourinary, Integumentary, Muscularskeletal, Psychiatric were all negative except for pertinent positives noted in my HPI and in the PMH.          EXAM:  There were no vitals taken for this visit.  There is no height or weight on file to calculate BMI.  General Appearance:  healthy, alert, active, no distress  Skin:  Normal skin turgor  Neuro:  Alert, cranial nerves grossly intact  HEENT: NCAT  Neck:  No masses or lesions carotids are +2/4. No bruits heard  Lungs:  Good respiratory effort   Abdomen: Soft, nontender.  Normal bowel sounds.  No masses  Vulva: No external lesions, normal hair distribution, no adenopathy  BUS:  Normal, no masses noted  Urethral meatus:  No masses noted  Urethra:  No hypermobility  Vagina: Moist, pink, no abnormal discharge, well rugated, no lesions  Cervix: Smooth, pink, no visible lesions  Uterus: Normal size, anteverted, non-tender, mobile  Ovaries: No mass, non-tender, mobile  Extremities:  No clubbing, cyanosis or edema.        ASSESSMENT:  Menorrhagia with regular cycle  Chronic blood loss anemia  Uterine leiomyoma       Plan: ***    (N93.9) Abnormal uterine bleeding  Comment: ***  Plan: ***        PLAN:  - Embx   - Pelvic U/S  - Check TSH, Hgb  We discussed the bleeding  profiles as people age and approach menopause.  Even though menstrual changes and irregularities are common and expected, they may also indicate or precipitate endometrial abnormalities. ***  The patient and I discussed the options for evaluation.  The EMB, SIS and the D&C were reviewed with her.  The EMB will not remove a polyp, but will give a tissue sample.  The SIS will further evaluate the lining, but no tissue sample, and should she have a suspected polyp, it would not be removed.  The D&C is more expensive but is currently the gold standard.    Together we reviewed the risks and benefits of medical versus surgical therapy.  Medical therapy reviewed included hormonal manipulation with OCP's, Patch, Ring, Depo, or IUD.   We reviewed the aspects of fibroid embolization***.  We reviewed endometrial ablation versus hysterectomy.  Discussed that endometrial ablation is minimally invasive compared to hysterectomy but may not be definitive.  Patient is considering ***.      Chaim Black MD

## 2025-06-06 ENCOUNTER — RESULTS FOLLOW-UP (OUTPATIENT)
Dept: OBGYN | Facility: CLINIC | Age: 40
End: 2025-06-06

## 2025-06-06 PROBLEM — N92.0 MENORRHAGIA WITH REGULAR CYCLE: Status: ACTIVE | Noted: 2025-06-06

## 2025-06-06 PROBLEM — D25.1 INTRAMURAL AND SUBMUCOUS LEIOMYOMA OF UTERUS: Status: ACTIVE | Noted: 2025-06-06

## 2025-06-06 PROBLEM — D25.0 INTRAMURAL AND SUBMUCOUS LEIOMYOMA OF UTERUS: Status: ACTIVE | Noted: 2025-06-06

## 2025-06-11 ENCOUNTER — ANCILLARY PROCEDURE (OUTPATIENT)
Dept: ULTRASOUND IMAGING | Facility: CLINIC | Age: 40
End: 2025-06-11
Attending: OBSTETRICS & GYNECOLOGY
Payer: COMMERCIAL

## 2025-06-11 DIAGNOSIS — D25.0 INTRAMURAL AND SUBMUCOUS LEIOMYOMA OF UTERUS: ICD-10-CM

## 2025-06-11 DIAGNOSIS — N93.9 ABNORMAL UTERINE BLEEDING: ICD-10-CM

## 2025-06-11 DIAGNOSIS — N92.0 MENORRHAGIA WITH REGULAR CYCLE: ICD-10-CM

## 2025-06-11 DIAGNOSIS — D25.1 INTRAMURAL AND SUBMUCOUS LEIOMYOMA OF UTERUS: ICD-10-CM

## 2025-06-11 PROCEDURE — 76856 US EXAM PELVIC COMPLETE: CPT | Mod: TC | Performed by: RADIOLOGY

## 2025-06-11 PROCEDURE — 76830 TRANSVAGINAL US NON-OB: CPT | Mod: TC | Performed by: RADIOLOGY

## 2025-06-12 NOTE — PATIENT INSTRUCTIONS
If you have any questions regarding your visit, Please contact your care team.    To Schedule an Appointment 24/7  Call: [phone]  Women s Health  TELEPHONE NUMBER   Chaim Black M.D.    Erika-Medical Assistant    Rachna Hernandez-Surgery Scheduler  [place]- Tuesday-[place]                   7:30 a.m.-3:30 p.m.  Wednesday-[place]             8:00 a.m.-4:30 p.m.  Thursday-[place]     8:00 a.m.-4:00 p.m.  Friday-[place]                       7:30 a.m.-1:00 p.m. [facility]  [address] [place] [address].  [place] [address] [phone] Phone  [phone] Fax    Imaging Scheduling all locations  [phone]      [facility] Labor and Delivery  [address]  [place], [place] [address] [phone]    [facility]  [address] [place] [address] [phone] ask for Women's Clinic     **Surgeries** Our Surgery Schedulers will contact you to schedule. If you do not receive a call within 3 business days, please call [phone].    Urgent Care locations:  [facility] Monday-Friday                 10 am - 8 pm  Saturday and Sunday        9 am - 5 pm   [phone]   If you need a medication refill, please contact your pharmacy. Please allow 3 business days for your refill to be completed.  As always, Thank you for trusting us with your healthcare needs!  If you have any questions regarding your visit, Please contact your care team.    [employer] [facility]: [phone]    To Schedule an Appointment 24/7  Call: [phone]    see additional instructions from your care team below

## 2025-06-19 ENCOUNTER — OFFICE VISIT (OUTPATIENT)
Dept: OBGYN | Facility: CLINIC | Age: 40
End: 2025-06-19
Payer: COMMERCIAL

## 2025-06-19 ENCOUNTER — TELEPHONE (OUTPATIENT)
Dept: OBGYN | Facility: CLINIC | Age: 40
End: 2025-06-19

## 2025-06-19 VITALS
WEIGHT: 214.9 LBS | BODY MASS INDEX: 36.89 KG/M2 | SYSTOLIC BLOOD PRESSURE: 106 MMHG | OXYGEN SATURATION: 100 % | DIASTOLIC BLOOD PRESSURE: 71 MMHG | HEART RATE: 80 BPM

## 2025-06-19 DIAGNOSIS — D50.0 IRON DEFICIENCY ANEMIA DUE TO CHRONIC BLOOD LOSS: ICD-10-CM

## 2025-06-19 DIAGNOSIS — N92.0 MENORRHAGIA WITH REGULAR CYCLE: ICD-10-CM

## 2025-06-19 DIAGNOSIS — D25.0 INTRAMURAL AND SUBMUCOUS LEIOMYOMA OF UTERUS: Primary | ICD-10-CM

## 2025-06-19 DIAGNOSIS — N92.0 MENORRHAGIA WITH REGULAR CYCLE: Primary | ICD-10-CM

## 2025-06-19 DIAGNOSIS — D25.0 SUBMUCOUS LEIOMYOMA OF UTERUS: ICD-10-CM

## 2025-06-19 DIAGNOSIS — D25.1 INTRAMURAL AND SUBMUCOUS LEIOMYOMA OF UTERUS: Primary | ICD-10-CM

## 2025-06-19 NOTE — TELEPHONE ENCOUNTER
[facility] SURGERY PLANNING/SCHEDULING WORKSHEET                                                     Bella Fung                :  1985  MRN:  [id number]  Home Phone [phone]   Work Phone Not on file.   Mobile [phone]         Surgeon: Chaim Black MD    DIAGNOSIS:   menorrhagia with regular cycles / uterine leiomyoma / anemia due to chronic blood loss.     SURGICAL PROCEDURE:  INOCENCIO/possible partial salpingectomy with Tap block     Surgery Location:  [facility]  Patient Surgery Class:  Admission with overnight stay of 3 days  Length of Procedure:  120 minutes  Type of anesthesia:  General with TAP block     Multi-surgeon case: Yes: when Dr. Agbeh is available   Additional OR technician needed for assistance?:   No  Vendor needed: No  Positioning:  Supine  Laterality:  NA  Date requested:  when able to be scheduled    Special Equipment: Ligassure  Special Instructions for patient:  Per the [facility] Pre-Admission Nurse when they call the patient prior to the surgery date.   Precautions:  HIV, diabetes   :  NOT NEEDED    Sterilization consent:  Not applicable to procedure being performed.    Preop: Pre-op options: PCP  Pre-surgery consult needed:  Before the surgery day please schedule a 30 minute IN PERSON appt to discuss the details of the surgery.  Postop evaluation needed:  6 weeks    ALLERGIES: No Known Allergies   BMI:There is no height or weight on file to calculate BMI.      The proposed surgical procedure is considered INTERMEDIATE risk.      Chaim Black MD    2025                     
Tdap; 17-Jun-2019 08:56; Priyanka Easley (RN); [employer]; [id number] (Exp. Date: 04-Apr-2021); IntraMuscular; Deltoid Left.; 0.5 milliLiter(s); VIS (VIS Published: 09-May-2013, VIS Presented: 17-Jun-2019);

## 2025-06-19 NOTE — PROGRESS NOTES
Bella is a 40 year old  here today for follow up regarding abnormal uterine bleeding.    She had been on Aygestin for about a year.  She takes this medication daily.    She had COVID in March and she was admitted to [facility].  In early April she started bleeding once she was released from the Hospital.  And she needed 1 unit of packed RBCs.  Around the  or , she started bleeding.     She was seen by FP on .   She was not bleeding heavy, so she did not start medications.  When she started having heavier bleeding in May, she started TXA but she does not feel that it helped.  She has another appointment next week, but she is out of the office until then.    She had her hgb at 8.5 on May 20.    She has been fatigued.  She has not had dizziness.    The order for the Fe infusions was placed, but I did not realize that Bella had to call to schedule the infusions.  The phone number is given to her, but during the visit today, [facility] called her to schedule the Fe infusions her PCP had ordered.     She had an ultrasound in .  She has a posterior fundal submucosal fibroid measuring 5.7 cm with associated distortion of the otherwise unremarkable appearing endometrium.  The left ovary unremarkable.  The right ovary was not identified.        She had the follow up ultrasound and the results are reviewed today with her.    EXAM: US PELVIC TRANSABDOMINAL AND TRANSVAGINAL  LOCATION: [facility]  DATE: 2025  INDICATION: History of uterine leiomyoma.  COMPARISON: None.  TECHNIQUE: Transabdominal scans were performed. Endovaginal ultrasound was performed to better visualize the adnexa.  FINDINGS:  UTERUS: 13.7 x 10.1 x 9.1 cm. Normal in size and position. There is a round heterogenous solid mass at the uterine fundus obscuring the endometrium, consistent with a submucosal fibroid measuring 9 x 8.9 x 7.2 cm  ENDOMETRIUM: Obscured by the uterine mass.  RIGHT  OVARY: Obscured by bowel gas.  LEFT OVARY: Obscured by bowel gas.  No significant free fluid.                                                              IMPRESSION:  1.  Submucosal uterine fibroid at the fundus measuring up to 9 cm.  2.  Nonvisualization of the ovaries.         Past Medical History:   Diagnosis Date    Chronic pain     Diabetes (H) 2019     Never on therapy.     History of pneumocystis pneumonia 2024    HIV (human immunodeficiency virus infection) (H)     [place] . [place] [facility] [place] Opportunistic Infections: None. HLA  Status: Negative . Historical use of ARVs: Truvada, Reyataz, and Norvir from  until 2013; switched atazanavir to darunavir 2013; emtricitabine-tenofovir and darunavir-cobicistat 10/2014- around , Descovy and Prezcobix in , then ~ 2017 to Descovy and Dolutegravir. Adherence very poor since       Past Surgical History:   Procedure Laterality Date    C/SECTION, LOW TRANSVERSE       SECTION, TUBAL LIGATION, COMBINED          No Known Allergies      Current Outpatient Medications   Medication Sig Dispense Refill    bictegravir-emtricitabine-tenofovir (BIKTARVY) -25 MG per tablet Take 1 tablet by mouth daily 90 tablet 0    ferrous sulfate (FEROSUL) 325 (65 Fe) MG tablet Take 325 mg by mouth daily (with breakfast).      imiquimod (ALDARA) 5 % external cream Apply topically three times a week.      naloxone (NARCAN) 4 MG/0.1ML nasal spray Spray 1 spray (4 mg) into one nostril alternating nostrils once as needed for opioid reversal every 2-3 minutes until assistance arrives 0.2 mL 0    norethindrone (AYGESTIN) 5 MG tablet Take 5 mg by mouth daily.      sulfamethoxazole-trimethoprim (BACTRIM) 400-80 MG tablet Take 1 tablet by mouth daily 90 tablet 0       Social History     Socioeconomic History    Marital status:      Spouse name: Not on file    Number of children: Not on file    Years of education: Not on file    Highest  education level: Not on file   Occupational History    Not on file   Tobacco Use    Smoking status: Never    Smokeless tobacco: Never    Tobacco comments:     smokes marijuana   Vaping Use    Vaping status: Never Used   Substance and Sexual Activity    Alcohol use: Not Currently    Drug use: Not Currently     Types: Marijuana     Comment: Daily.     Sexual activity: Yes     Partners: Male     Birth control/protection: None   Other Topics Concern    Parent/sibling w/ CABG, MI or angioplasty before 65F 55M? Not Asked   Social History Narrative    Not on file     Social Drivers of Health     Financial Resource Strain: Not on File (2024)    Received from [facility]    Financial Resource Strain     Financial Resource Strain: 0   Food Insecurity: Not on File (2024)    Received from [facility]    Food Insecurity     Food: 0   Transportation Needs: Not on File (2024)    Received from [facility]    Transportation Needs     Transportation: 0   Physical Activity: Not on File (2024)    Received from [facility]    Physical Activity     Physical Activity: 0   Stress: Not on File (2024)    Received from [facility]    Stress     Stress: 0   Social Connections: Not on File (9/15/2024)    Received from [facility]    Social Connections     Connectedness: 0   Interpersonal Safety: Not on file   Housing Stability: Not on File (2024)    Received from [facility]    Housing Stability     Housin       Family History   Problem Relation Age of Onset    Glaucoma Paternal Grandmother        Review of Systems:  10 point ROS of systems including Constitutional, Eyes, Respiratory, Cardiovascular, Gastroenterology, Genitourinary, Integumentary, Muscularskeletal, Psychiatric were all negative except for pertinent positives noted in my HPI and in the PMH.        Exam  MA present for the exam and procedure  /71 (BP Location: Right arm, Patient Position: Sitting, Cuff Size: Adult Large)   Pulse 80   Wt 97.5 kg (214 lb 14.4 oz)   LMP  (LMP Unknown)    SpO2 100%   BMI 36.89 kg/m    General:  WNWD female, NAD  Alert  Oriented x 3  Gait:  Normal  Skin:  Normal skin turgor  HEENT:  NC/AT, EOMI  Abdomen:  Non-tender, non-distended.  Vulva: No external lesions, normal hair distribution, no adenopathy  BUS:  Normal, no masses noted  Urethra:  No hypermobility seen  Urethral meatus:  No masses noted  Vagina: Moist, pink, blood seen, well rugated, no lesions  Cervix: Smooth, pink, no visible lesions  Uterus: Normal size, anteverted, non-tender, mobile  Ovaries: No mass, non-tender, mobile  Perianal:  No masses noted  Extremities:  No clubbing, no cyanosis and no edema.      Assessment  Menorrhagia with regular cycle  Submucosal uterine leiomyoma   Anemia due to chronic blood loss.      Plan  She desires to proceed with the hysterectomy.  The approach will be abdominal due to her prior c/sections and the size of the uterine leiomyoma   I gave her the number to call for the Fe infusions, should she choose to do it through [facility].  Her [facility] provider ordered one infusion and I have ordered 3.    She should have the endometrial evaluation prior to the hysterectomy.  She consents to having the endometrial biopsy today in clinic.   The surgical request is placed for the hysterectomy.    RTC for further management discussion.    Total time preparing to see patient with reviewing prior encounter and labs, face to face time, coordinating care and documentation, on the same calendar date: 30 minutes, with additional time for the procedure noted below.    Chaim Black MD      PROCEDURE NOTE: Endometrial biopsy  MA present for the procedure:   The procedure was reviewed with patient.  After consenting to the procedure she was placed into the dorsal lithotomy position.  The examination was performed.  The speculum was placed into the vagina.  Blood was seen in the vault of the vagina.  The cervix was prepped with Betadine.  The anterior lip of the cervix was grasped with the  Allis tenaculum.  The uterus was sounded to 10 cm.  The Pipelle was used to sample the endometrium.    Instruments were removed and the specimen was sent to pathology.  Results to the patient in 1-2 weeks when they are returned.

## 2025-06-20 ENCOUNTER — TELEPHONE (OUTPATIENT)
Dept: OBGYN | Facility: CLINIC | Age: 40
End: 2025-06-20
Payer: COMMERCIAL

## 2025-06-20 NOTE — TELEPHONE ENCOUNTER
[facility] SURGERY PLANNING/SCHEDULING WORKSHEET                                                     Bella Fung                :  1985  MRN:  [id number]  Home Phone [phone]   Work Phone Not on file.   Mobile [phone]         Surgeon: Chaim Black MD     DIAGNOSIS:   menorrhagia with regular cycles / uterine leiomyoma / anemia due to chronic blood loss.      SURGICAL PROCEDURE:  INOCENCIO/possible partial salpingectomy with Tap block      Surgery Location:  [facility]  Patient Surgery Class:  Admission with overnight stay of 3 days  Length of Procedure:  120 minutes  Type of anesthesia:  General with TAP block      Multi-surgeon case: Yes: when Dr. Agbeh is available   Additional OR technician needed for assistance?:   No  Vendor needed: No  Positioning:  Supine  Laterality:  NA  Date requested:  when able to be scheduled     Special Equipment: Ligassure  Special Instructions for patient:  Per the [facility] Pre-Admission Nurse when they call the patient prior to the surgery date.   Precautions:  HIV, diabetes   :  NOT NEEDED     Sterilization consent:  Not applicable to procedure being performed.     Preop: Pre-op options: PCP  Pre-surgery consult needed:  Before the surgery day please schedule a 30 minute IN PERSON appt to discuss the details of the surgery.  Postop evaluation needed:  6 weeks     ALLERGIES:   Allergies   No Known Allergies      BMI:There is no height or weight on file to calculate BMI.       The proposed surgical procedure is considered INTERMEDIATE risk.        Chaim Black MD    2025    SURGERY SCHEDULING AND PRECERTIFICATION    Medical Record Number: [id number]  Bella Lewisdwell  YOB: 1985   Phone: [phone] (home)   Primary Provider: [facility]    Reason for Admit:  ICD-10 CODE:  D25.1, D25.0, N92.0, D50.0     Surgeon: Chaim Black MD  Surgical Procedure: Total Abdominal  Hysterectomy/possible partial salpingectomy with Tap block     Date of Surgery 07/15/2025 Time of Surgery 1:45 PM  Surgery to be performed at:  [facility]  Status: Inpatient- Length of stay:  3 days.  Type of Anesthesia Anticipated: General with TAP block     Sterilization consent:  Not applicable to procedure being performed.    Pre-Op: [facility]- pt will fax if needed  COVID testing:  Per Provider's discretion Covid testing is not indicated.     Post-Op:   6 weeks on 08/26/2025 with Chaim Black MD at [facility]  Pre-certification routed to Financial Counselors:  Yes    Surgery packet: Sent via [employer]  Patient instructed NPO 12 hours prior to surgery, arrive 1.5 hours  prior to surgery, must have a .  Patient understood and agrees to the plan.      Requestor:  Lia Mukherjee     Location:  [facility] [phone]

## 2025-06-23 LAB
PATH REPORT.COMMENTS IMP SPEC: NORMAL
PATH REPORT.COMMENTS IMP SPEC: NORMAL
PATH REPORT.FINAL DX SPEC: NORMAL
PATH REPORT.GROSS SPEC: NORMAL
PATH REPORT.MICROSCOPIC SPEC OTHER STN: NORMAL
PATH REPORT.RELEVANT HX SPEC: NORMAL
PHOTO IMAGE: NORMAL

## 2025-06-25 ENCOUNTER — RESULTS FOLLOW-UP (OUTPATIENT)
Dept: OBGYN | Facility: CLINIC | Age: 40
End: 2025-06-25

## 2025-06-27 ENCOUNTER — OFFICE VISIT (OUTPATIENT)
Dept: OBGYN | Facility: CLINIC | Age: 40
End: 2025-06-27
Payer: COMMERCIAL

## 2025-06-27 VITALS
OXYGEN SATURATION: 100 % | HEART RATE: 78 BPM | DIASTOLIC BLOOD PRESSURE: 76 MMHG | SYSTOLIC BLOOD PRESSURE: 113 MMHG | WEIGHT: 217 LBS | BODY MASS INDEX: 37.25 KG/M2

## 2025-06-27 DIAGNOSIS — D25.0 SUBMUCOUS LEIOMYOMA OF UTERUS: Primary | ICD-10-CM

## 2025-06-27 DIAGNOSIS — N93.9 ABNORMAL UTERINE BLEEDING: ICD-10-CM

## 2025-06-27 DIAGNOSIS — D50.0 IRON DEFICIENCY ANEMIA DUE TO CHRONIC BLOOD LOSS: ICD-10-CM

## 2025-06-27 DIAGNOSIS — R53.83 OTHER FATIGUE: ICD-10-CM

## 2025-06-27 PROCEDURE — 99214 OFFICE O/P EST MOD 30 MIN: CPT | Performed by: OBSTETRICS & GYNECOLOGY

## 2025-06-30 NOTE — PROGRESS NOTES
Bella is a 40 year old , here today with her , for further discussion abnormal uterine bleeding, chronic iron deficiency anemia and the planned surgery.    She had her hgb checked yesterday with her primary provider.  She states her hgb was 5.5.  She is going to to [facility] after this visit for admission for blood transfusion.  She has been fatigued but no dizziness.  She had been on Aygestin for about a year.  She takes this medication daily.    She had COVID in March and she was admitted to [facility].  In early April she started bleeding once she was released from the Hospital.  And she needed 1 unit of packed RBCs.  Around the  or , she started bleeding.     She was seen by FP on .   She was not bleeding heavy, so she did not start medications.  When she started having heavier bleeding in May, she started TXA but she does not feel that it helped.   She had her hgb at 8.5 on May 20, and on .    She has not had an ultrasound performed this year.  She had one in , and the results are reviewed through her [facility] on her phone as she does not participate in the Care Everywhere and those results are not available through Care Everywhere for review.  She has a posterior fundal submucosal fibroid measuring 5.7 cm with associated distortion of the otherwise unremarkable appearing endometrium.  The left ovary unremarkable.  The right ovary was not identified.    Her ultrasound with [facility] shows the following:  EXAM: US PELVIC TRANSABDOMINAL AND TRANSVAGINAL  LOCATION: [facility]  DATE: 2025  INDICATION: History of uterine leiomyoma.  COMPARISON: None.  TECHNIQUE: Transabdominal scans were performed. Endovaginal ultrasound was performed to better visualize the adnexa.  FINDINGS:  UTERUS: 13.7 x 10.1 x 9.1 cm. Normal in size and position. There is a round heterogenous solid mass at the uterine fundus obscuring the endometrium, consistent  with a submucosal fibroid measuring 9 x 8.9 x 7.2 cm  ENDOMETRIUM: Obscured by the uterine mass.  RIGHT OVARY: Obscured by bowel gas.  LEFT OVARY: Obscured by bowel gas.  No significant free fluid.                                                              IMPRESSION:  1.  Submucosal uterine fibroid at the fundus measuring up to 9 cm.  2.  Nonvisualization of the ovaries.    She also has had the EMB on 2025, and the following results are noted:  Final Diagnosis   A. Endometrium, biopsy:  - Inactive endometrium with pseudo decidualized stroma, consistent with exogenous progesterone effect  - Negative for hyperplasia or malignancy       We reviewed the approaches for hysterectomy.   We discussed the different routes of surgery including abdominal, vaginal, and laparoscopic and the possibility that the route of surgery may change during the procedure.  Since she has had 2 prior  sections, the plan will be to proceed with the abdominal approach. We discussed both total and supracervical hysterectomy and the benefits and contraindications involved.  We discussed ovarian sparing as well as oophorectomy, and the associated risks and benefits.  She also is informed that with ovarian sparing, she could still have inadvertent ovarian failure and the reasons for this were reviewed.  I also reviewed with her that she would have a 25% chance of needing surgery in the future with the ovarian sparing (pain, cysts, malignancies).  This also means that she would have ~75% chance that she would never need surgery in the future, in regard to the ovaries.  The ACOG pamphlets have been given and reviewed with the patient. The patient is aware that hysterectomy will render her sterile and unable to have further children.The risks of surgery were reviewed and include, but are not limited to, death, brain damage, paralysis of any or all limbs, loss of an organ, function of an organ, disfiguring scars, bleeding, infection,  damage to the ovaries, bowel, bladder, ureters and vagina.  In addition, there is a possibility of other procedures that might be deemed necessary at the time of the surgery, depending upon findings and/or complications.  This may also include laparoscopy, laparotomy, and rarely colostomy (which often times can be reversible in the future).  Risks with blood include but are not limited to HIV/AIDS, hepatitis and transfusion reactions, with transfusion reactions being the greatest risk.    We reviewed pre and post op course. Pre op enemas were reviewed.  NPO after MN.  Post op pain management, ambulation, martinez packing's were also reviewed.  Discharge precautions, lifting restrictions, driving restrictions as well as the pelvic activity restrictions were reviewed with her.  Patient was given the opportunity to ask questions and have them answered.  The medical history, social history and family history are documented in the electronic record in the appropriate sections.  No updates at this visit.    10 point ROS of systems including Constitutional, Eyes, Respiratory, Cardiovascular, Gastroenterology, Genitourinary, Integumentary, Muscularskeletal, Psychiatric were all negative except for pertinent positives noted in my HPI.      Exam  /76 (BP Location: Right arm, Cuff Size: Adult Large)   Pulse 78   Wt 98.4 kg (217 lb)   LMP 04/05/2025 (Approximate)   SpO2 100%   BMI 37.25 kg/m    General:  WNWD female, lethargic   Alert  Oriented x 3  Gait:  Normal, slow  Skin:  Normal skin turgor  HEENT:  NC/AT, EOMI  Abdomen:  Non-tender, non-distended.  Pelvic exam:  Not performed  Extremities:  No clubbing, no cyanosis and no edema      Assessment  Uterine leiomyoma (exacerbating the iron deficiency anemia and the fatigue)   Abnormal uterine bleeding   Chronic iron deficiency anemia   Fatigue       Plan  At this time she desires to proceed with the hysterectomy, including the removal of the cervix and any remaining  fallopian tubes.    She voiced her questions have been answered.     MD Chaim Alcaraz MD

## 2025-08-16 ENCOUNTER — HEALTH MAINTENANCE LETTER (OUTPATIENT)
Age: 40
End: 2025-08-16

## 2025-08-26 ENCOUNTER — OFFICE VISIT (OUTPATIENT)
Dept: OBGYN | Facility: CLINIC | Age: 40
End: 2025-08-26
Payer: COMMERCIAL

## 2025-08-26 VITALS
BODY MASS INDEX: 38.59 KG/M2 | OXYGEN SATURATION: 99 % | WEIGHT: 224.8 LBS | HEART RATE: 64 BPM | SYSTOLIC BLOOD PRESSURE: 119 MMHG | DIASTOLIC BLOOD PRESSURE: 75 MMHG

## 2025-08-26 DIAGNOSIS — Z98.890 POSTOPERATIVE STATE: Primary | ICD-10-CM

## 2025-08-26 PROCEDURE — 99024 POSTOP FOLLOW-UP VISIT: CPT | Performed by: OBSTETRICS & GYNECOLOGY
